# Patient Record
Sex: MALE | Race: OTHER | Employment: OTHER | URBAN - METROPOLITAN AREA
[De-identification: names, ages, dates, MRNs, and addresses within clinical notes are randomized per-mention and may not be internally consistent; named-entity substitution may affect disease eponyms.]

---

## 2017-01-04 ENCOUNTER — APPOINTMENT (OUTPATIENT)
Dept: CT IMAGING | Facility: HOSPITAL | Age: 77
DRG: 871 | End: 2017-01-04
Attending: INTERNAL MEDICINE
Payer: MEDICARE

## 2017-01-04 ENCOUNTER — HOSPITAL ENCOUNTER (INPATIENT)
Facility: HOSPITAL | Age: 77
LOS: 20 days | Discharge: HOME | DRG: 871 | End: 2017-01-24
Attending: EMERGENCY MEDICINE | Admitting: INTERNAL MEDICINE
Payer: MEDICARE

## 2017-01-04 ENCOUNTER — APPOINTMENT (OUTPATIENT)
Dept: GENERAL RADIOLOGY | Facility: HOSPITAL | Age: 77
DRG: 871 | End: 2017-01-04
Attending: EMERGENCY MEDICINE
Payer: MEDICARE

## 2017-01-04 ENCOUNTER — APPOINTMENT (OUTPATIENT)
Dept: CV DIAGNOSTICS | Facility: HOSPITAL | Age: 77
DRG: 871 | End: 2017-01-04
Attending: INTERNAL MEDICINE
Payer: MEDICARE

## 2017-01-04 DIAGNOSIS — J18.9 SEPSIS DUE TO PNEUMONIA (HCC): ICD-10-CM

## 2017-01-04 DIAGNOSIS — J18.9 COMMUNITY ACQUIRED PNEUMONIA: Primary | ICD-10-CM

## 2017-01-04 DIAGNOSIS — J44.1 COPD EXACERBATION (HCC): ICD-10-CM

## 2017-01-04 DIAGNOSIS — A41.9 SEPSIS DUE TO PNEUMONIA (HCC): ICD-10-CM

## 2017-01-04 PROBLEM — R79.89 AZOTEMIA: Status: ACTIVE | Noted: 2017-01-04

## 2017-01-04 PROBLEM — R73.9 HYPERGLYCEMIA: Status: ACTIVE | Noted: 2017-01-04

## 2017-01-04 PROBLEM — D64.9 ANEMIA: Status: ACTIVE | Noted: 2017-01-04

## 2017-01-04 LAB
ANION GAP SERPL CALC-SCNC: 12 MMOL/L (ref 0–18)
APTT PPP: 26.4 SECONDS (ref 23.2–35.3)
APTT PPP: 92 SECONDS (ref 23.2–35.3)
BACTERIA UR QL AUTO: NEGATIVE /HPF
BASOPHILS # BLD: 0 K/UL (ref 0–0.2)
BASOPHILS NFR BLD: 0 %
BILIRUB UR QL: NEGATIVE
BNP SERPL-MCNC: 196 PG/ML (ref 0–100)
BUN SERPL-MCNC: 35 MG/DL (ref 8–20)
BUN/CREAT SERPL: 35 (ref 10–20)
CALCIUM SERPL-MCNC: 9.1 MG/DL (ref 8.5–10.5)
CHLORIDE SERPL-SCNC: 99 MMOL/L (ref 95–110)
CHOLEST SERPL-MCNC: 138 MG/DL (ref 110–200)
CLARITY UR: CLEAR
CO2 SERPL-SCNC: 30 MMOL/L (ref 22–32)
COLOR UR: YELLOW
CREAT SERPL-MCNC: 1 MG/DL (ref 0.5–1.5)
EOSINOPHIL # BLD: 0 K/UL (ref 0–0.7)
EOSINOPHIL NFR BLD: 0 %
ERYTHROCYTE [DISTWIDTH] IN BLOOD BY AUTOMATED COUNT: 15.5 % (ref 11–15)
FLUAV + FLUBV RNA SPEC NAA+PROBE: NEGATIVE
GLUCOSE BLDC GLUCOMTR-MCNC: 125 MG/DL (ref 70–99)
GLUCOSE BLDC GLUCOMTR-MCNC: 174 MG/DL (ref 70–99)
GLUCOSE BLDC GLUCOMTR-MCNC: 232 MG/DL (ref 70–99)
GLUCOSE BLDC GLUCOMTR-MCNC: 323 MG/DL (ref 70–99)
GLUCOSE BLDC GLUCOMTR-MCNC: 366 MG/DL (ref 70–99)
GLUCOSE BLDC GLUCOMTR-MCNC: 374 MG/DL (ref 70–99)
GLUCOSE BLDC GLUCOMTR-MCNC: 431 MG/DL (ref 70–99)
GLUCOSE SERPL-MCNC: 256 MG/DL (ref 70–99)
HCT VFR BLD AUTO: 29.7 % (ref 41–52)
HDLC SERPL-MCNC: 45 MG/DL
HGB BLD-MCNC: 9.2 G/DL (ref 13.5–17.5)
HYALINE CASTS #/AREA URNS AUTO: 1 /LPF
INR BLD: 1 (ref 0.9–1.2)
KETONES UR-MCNC: NEGATIVE MG/DL
LACTATE SERPL-SCNC: 1.6 MMOL/L (ref 0.5–2.2)
LACTATE SERPL-SCNC: 1.9 MMOL/L (ref 0.5–2.2)
LACTATE SERPL-SCNC: 2.3 MMOL/L (ref 0.5–2.2)
LACTATE SERPL-SCNC: 2.3 MMOL/L (ref 0.5–2.2)
LACTATE SERPL-SCNC: 3.3 MMOL/L (ref 0.5–2.2)
LDLC SERPL CALC-MCNC: 55 MG/DL (ref 0–99)
LEUKOCYTE ESTERASE UR QL STRIP.AUTO: NEGATIVE
LYMPHOCYTES # BLD: 1.3 K/UL (ref 1–4)
LYMPHOCYTES NFR BLD: 8 %
MCH RBC QN AUTO: 26 PG (ref 27–32)
MCHC RBC AUTO-ENTMCNC: 31 G/DL (ref 32–37)
MCV RBC AUTO: 83.8 FL (ref 80–100)
MONOCYTES # BLD: 1.2 K/UL (ref 0–1)
MONOCYTES NFR BLD: 7 %
MRSA DNA SPEC QL NAA+PROBE: NEGATIVE
NEUTROPHILS # BLD AUTO: 13.4 K/UL (ref 1.8–7.7)
NEUTROPHILS NFR BLD: 85 %
NITRITE UR QL STRIP.AUTO: NEGATIVE
NONHDLC SERPL-MCNC: 93 MG/DL
OSMOLALITY UR CALC.SUM OF ELEC: 309 MOSM/KG (ref 275–295)
PH UR: 5 [PH] (ref 5–8)
PLATELET # BLD AUTO: 234 K/UL (ref 140–400)
PMV BLD AUTO: 10.4 FL (ref 7.4–10.3)
POTASSIUM SERPL-SCNC: 4 MMOL/L (ref 3.3–5.1)
PROCALCITONIN SERPL-MCNC: 0.3 NG/ML (ref ?–0.11)
PROT UR-MCNC: 100 MG/DL
PROTHROMBIN TIME: 12.6 SECONDS (ref 11.8–14.5)
RBC # BLD AUTO: 3.54 M/UL (ref 4.5–5.9)
RBC #/AREA URNS AUTO: 4 /HPF
SODIUM SERPL-SCNC: 141 MMOL/L (ref 136–144)
SP GR UR STRIP: 1.02 (ref 1–1.03)
TRIGL SERPL-MCNC: 191 MG/DL (ref 1–149)
TROPONIN I SERPL-MCNC: 0.05 NG/ML (ref ?–0.03)
TROPONIN I SERPL-MCNC: 0.05 NG/ML (ref ?–0.03)
UROBILINOGEN UR STRIP-ACNC: <2
VIT C UR-MCNC: NEGATIVE MG/DL
WBC # BLD AUTO: 15.9 K/UL (ref 4–11)
WBC #/AREA URNS AUTO: 1 /HPF

## 2017-01-04 PROCEDURE — 71010 XR CHEST AP PORTABLE  (CPT=71010): CPT

## 2017-01-04 PROCEDURE — 99223 1ST HOSP IP/OBS HIGH 75: CPT | Performed by: INTERNAL MEDICINE

## 2017-01-04 PROCEDURE — 93306 TTE W/DOPPLER COMPLETE: CPT

## 2017-01-04 PROCEDURE — 93306 TTE W/DOPPLER COMPLETE: CPT | Performed by: INTERNAL MEDICINE

## 2017-01-04 PROCEDURE — 71260 CT THORAX DX C+: CPT

## 2017-01-04 RX ORDER — ALBUTEROL SULFATE 2.5 MG/3ML
2.5 SOLUTION RESPIRATORY (INHALATION)
Status: DISPENSED | OUTPATIENT
Start: 2017-01-04 | End: 2017-01-07

## 2017-01-04 RX ORDER — ACETAMINOPHEN 500 MG
1000 TABLET ORAL ONCE
Status: COMPLETED | OUTPATIENT
Start: 2017-01-04 | End: 2017-01-04

## 2017-01-04 RX ORDER — IPRATROPIUM BROMIDE AND ALBUTEROL SULFATE 2.5; .5 MG/3ML; MG/3ML
3 SOLUTION RESPIRATORY (INHALATION) ONCE
Status: COMPLETED | OUTPATIENT
Start: 2017-01-04 | End: 2017-01-04

## 2017-01-04 RX ORDER — ALBUTEROL SULFATE 2.5 MG/3ML
2.5 SOLUTION RESPIRATORY (INHALATION)
Status: DISCONTINUED | OUTPATIENT
Start: 2017-01-04 | End: 2017-01-07

## 2017-01-04 RX ORDER — SODIUM CHLORIDE 0.9 % (FLUSH) 0.9 %
SYRINGE (ML) INJECTION
Status: DISPENSED
Start: 2017-01-04 | End: 2017-01-05

## 2017-01-04 RX ORDER — ATORVASTATIN CALCIUM 40 MG/1
80 TABLET, FILM COATED ORAL NIGHTLY
Status: DISCONTINUED | OUTPATIENT
Start: 2017-01-04 | End: 2017-01-24

## 2017-01-04 RX ORDER — METHYLPREDNISOLONE SODIUM SUCCINATE 125 MG/2ML
125 INJECTION, POWDER, LYOPHILIZED, FOR SOLUTION INTRAMUSCULAR; INTRAVENOUS ONCE
Status: COMPLETED | OUTPATIENT
Start: 2017-01-04 | End: 2017-01-04

## 2017-01-04 RX ORDER — ALBUTEROL SULFATE 90 UG/1
2 AEROSOL, METERED RESPIRATORY (INHALATION) EVERY 4 HOURS PRN
Status: DISCONTINUED | OUTPATIENT
Start: 2017-01-04 | End: 2017-01-24

## 2017-01-04 RX ORDER — LISINOPRIL 5 MG/1
5 TABLET ORAL DAILY
Status: DISCONTINUED | OUTPATIENT
Start: 2017-01-04 | End: 2017-01-17

## 2017-01-04 RX ORDER — LEVOTHYROXINE SODIUM 0.05 MG/1
50 TABLET ORAL
Status: DISCONTINUED | OUTPATIENT
Start: 2017-01-04 | End: 2017-01-24

## 2017-01-04 RX ORDER — HEPARIN SODIUM AND DEXTROSE 10000; 5 [USP'U]/100ML; G/100ML
INJECTION INTRAVENOUS CONTINUOUS
Status: DISCONTINUED | OUTPATIENT
Start: 2017-01-04 | End: 2017-01-05

## 2017-01-04 RX ORDER — DILTIAZEM HYDROCHLORIDE 240 MG/1
240 CAPSULE, EXTENDED RELEASE ORAL DAILY
Status: ON HOLD | COMMUNITY
End: 2018-01-01

## 2017-01-04 RX ORDER — METHYLPREDNISOLONE SODIUM SUCCINATE 40 MG/ML
40 INJECTION, POWDER, LYOPHILIZED, FOR SOLUTION INTRAMUSCULAR; INTRAVENOUS EVERY 12 HOURS
Status: DISCONTINUED | OUTPATIENT
Start: 2017-01-04 | End: 2017-01-04

## 2017-01-04 RX ORDER — METHYLPREDNISOLONE SODIUM SUCCINATE 40 MG/ML
40 INJECTION, POWDER, LYOPHILIZED, FOR SOLUTION INTRAMUSCULAR; INTRAVENOUS EVERY 12 HOURS
Status: DISCONTINUED | OUTPATIENT
Start: 2017-01-04 | End: 2017-01-06

## 2017-01-04 RX ORDER — DILTIAZEM HYDROCHLORIDE 240 MG/1
240 CAPSULE, COATED, EXTENDED RELEASE ORAL DAILY
Status: DISCONTINUED | OUTPATIENT
Start: 2017-01-04 | End: 2017-01-24

## 2017-01-04 RX ORDER — HEPARIN SODIUM AND DEXTROSE 10000; 5 [USP'U]/100ML; G/100ML
18 INJECTION INTRAVENOUS ONCE
Status: COMPLETED | OUTPATIENT
Start: 2017-01-04 | End: 2017-01-04

## 2017-01-04 RX ORDER — HEPARIN SODIUM 1000 [USP'U]/ML
80 INJECTION, SOLUTION INTRAVENOUS; SUBCUTANEOUS ONCE
Status: COMPLETED | OUTPATIENT
Start: 2017-01-04 | End: 2017-01-04

## 2017-01-04 RX ORDER — MORPHINE SULFATE 2 MG/ML
2 INJECTION, SOLUTION INTRAMUSCULAR; INTRAVENOUS ONCE
Status: COMPLETED | OUTPATIENT
Start: 2017-01-04 | End: 2017-01-04

## 2017-01-04 NOTE — ED NOTES
Pt has diminished lung sounds to the upper lobes bilateral, very minimal air flow. Pt 86% on 5 LPM via nasal cannula. Placed pt on NRB at this time at 15 lpm, oxygen increased to 93%.

## 2017-01-04 NOTE — ED INITIAL ASSESSMENT (HPI)
Pt BIBA for SOB at home. Pt uses home oxygen 3 lpm, increased to 5 lpm by EMS when found to be 86%, given one treatment albuterol increased to 92%. Pt recently admitted here for respiratory diagnosis on 12/27, was discharged on [address]

## 2017-01-04 NOTE — ED NOTES
Patient to ED via EMS for c/o sob- per EMS patient found at 86% RA- oxygen applied and medicated with albuterol neb tx- with O2 sats up to 93% pta. Patient appears to be labored, unable to form full sentences.   Lung sounds absent in bilateral upper lobes

## 2017-01-04 NOTE — CONSULTS
Pulmonary/Critical Care Consult Note    HPI:   Tuan Sharma is a 68year old male with Patient presents with:  Dyspnea HECTOR SOB (respiratory)    Arelis Contreras MD    Pt is a 67 yo with h/o HL, CAD, DM, HTN, and COPD who presented with  Severe mouth daily. Disp:  Rfl:    MetFORMIN HCl (GLUCOPHAGE) 500 MG Oral Tab 500 mg 2 (two) times daily with meals.    Disp:  Rfl:    Levothyroxine Sodium (SYNTHROID) 50 MCG Oral Tab Take 50 mcg by mouth before breakfast.   Disp:  Rfl: 1   Atorvastatin Calcium (L (Last 24 hours) at 01/04/17 0909  Last data filed at 01/04/17 2323   Gross per 24 hour   Intake   2000 ml   Output    150 ml   Net   1850 ml     NAD  A&Ox3  Head AT/NC  Anicteric  Lung very quiet  CV reg  Abd soft NT/ND  Ext trace edema  Skin No rash  Psyc

## 2017-01-04 NOTE — ED PROVIDER NOTES
Patient Seen in: [facility] AND [facility] Emergency Department    History   Patient presents with:  Dyspnea HECTOR SOB (respiratory)    Stated Complaint: SOB    HPI    67 yo male with h/o COPD was hospitalized recently for COPD and discharged on 12/31.  He is on 3l Tab,  Take 50 mcg by mouth before breakfast.     Atorvastatin Calcium (LIPITOR) 80 MG Oral Tab,  80 mg nightly. Roflumilast 500 MCG Oral Tab,  Take 500 mcg by mouth daily. [address] [place],         History reviewed.  No pertinent f alert and oriented to person, place, and time. No cranial nerve deficit. Skin: Skin is warm and dry. Psychiatric: He has a normal mood and affect. His behavior is normal.   Nursing note and vitals reviewed.             ED Course     Labs Reviewed   TIERRA other components within normal limits   POCT GLUCOSE - Abnormal; Notable for the following:     POC Glucose  374 (*)     All other components within normal limits   POCT GLUCOSE - Abnormal; Notable for the following:     POC Glucose  323 (*)     All other concerning for pneumonia. Appearance similar to mildly increased as compared to prior study dated 12/27/2016. Additional small bilateral pleural effusions are noted. Diffuse interstitial thickening is noted in both lungs.  Can be seen with atypical infe

## 2017-01-05 LAB
ANION GAP SERPL CALC-SCNC: 7 MMOL/L (ref 0–18)
APTT PPP: 86.6 SECONDS (ref 23.2–35.3)
BUN SERPL-MCNC: 27 MG/DL (ref 8–20)
BUN/CREAT SERPL: 24.1 (ref 10–20)
CALCIUM SERPL-MCNC: 8.3 MG/DL (ref 8.5–10.5)
CHLORIDE SERPL-SCNC: 103 MMOL/L (ref 95–110)
CO2 SERPL-SCNC: 32 MMOL/L (ref 22–32)
CREAT SERPL-MCNC: 1.12 MG/DL (ref 0.5–1.5)
ERYTHROCYTE [DISTWIDTH] IN BLOOD BY AUTOMATED COUNT: 15.9 % (ref 11–15)
GLUCOSE BLDC GLUCOMTR-MCNC: 133 MG/DL (ref 70–99)
GLUCOSE BLDC GLUCOMTR-MCNC: 133 MG/DL (ref 70–99)
GLUCOSE BLDC GLUCOMTR-MCNC: 138 MG/DL (ref 70–99)
GLUCOSE BLDC GLUCOMTR-MCNC: 141 MG/DL (ref 70–99)
GLUCOSE BLDC GLUCOMTR-MCNC: 143 MG/DL (ref 70–99)
GLUCOSE BLDC GLUCOMTR-MCNC: 166 MG/DL (ref 70–99)
GLUCOSE BLDC GLUCOMTR-MCNC: 174 MG/DL (ref 70–99)
GLUCOSE BLDC GLUCOMTR-MCNC: 177 MG/DL (ref 70–99)
GLUCOSE BLDC GLUCOMTR-MCNC: 178 MG/DL (ref 70–99)
GLUCOSE BLDC GLUCOMTR-MCNC: 186 MG/DL (ref 70–99)
GLUCOSE BLDC GLUCOMTR-MCNC: 189 MG/DL (ref 70–99)
GLUCOSE BLDC GLUCOMTR-MCNC: 194 MG/DL (ref 70–99)
GLUCOSE BLDC GLUCOMTR-MCNC: 202 MG/DL (ref 70–99)
GLUCOSE BLDC GLUCOMTR-MCNC: 215 MG/DL (ref 70–99)
GLUCOSE BLDC GLUCOMTR-MCNC: 219 MG/DL (ref 70–99)
GLUCOSE BLDC GLUCOMTR-MCNC: 241 MG/DL (ref 70–99)
GLUCOSE BLDC GLUCOMTR-MCNC: 245 MG/DL (ref 70–99)
GLUCOSE BLDC GLUCOMTR-MCNC: 295 MG/DL (ref 70–99)
GLUCOSE BLDC GLUCOMTR-MCNC: 310 MG/DL (ref 70–99)
GLUCOSE SERPL-MCNC: 132 MG/DL (ref 70–99)
HCT VFR BLD AUTO: 25.9 % (ref 41–52)
HGB BLD-MCNC: 8 G/DL (ref 13.5–17.5)
MCH RBC QN AUTO: 26.1 PG (ref 27–32)
MCHC RBC AUTO-ENTMCNC: 31.1 G/DL (ref 32–37)
MCV RBC AUTO: 84 FL (ref 80–100)
OSMOLALITY UR CALC.SUM OF ELEC: 301 MOSM/KG (ref 275–295)
PLATELET # BLD AUTO: 202 K/UL (ref 140–400)
PMV BLD AUTO: 11 FL (ref 7.4–10.3)
POTASSIUM SERPL-SCNC: 4 MMOL/L (ref 3.3–5.1)
RBC # BLD AUTO: 3.08 M/UL (ref 4.5–5.9)
SODIUM SERPL-SCNC: 142 MMOL/L (ref 136–144)
WBC # BLD AUTO: 18.9 K/UL (ref 4–11)

## 2017-01-05 PROCEDURE — 99233 SBSQ HOSP IP/OBS HIGH 50: CPT | Performed by: INTERNAL MEDICINE

## 2017-01-05 RX ORDER — DEXTROSE MONOHYDRATE 25 G/50ML
50 INJECTION, SOLUTION INTRAVENOUS AS NEEDED
Status: DISCONTINUED | OUTPATIENT
Start: 2017-01-05 | End: 2017-01-24

## 2017-01-05 NOTE — HISTORICAL OFFICE NOTE
Nicanor Quintana  [phone]  : 1940  ACCOUNT:  [id number]  Hospital: [facility]    DATE:  2015    DISCHARGE SUMMARY    HOSPITAL COURSE: A 66-year-old patient who had been in [facility] on 02/16/15 with a recurren

## 2017-01-05 NOTE — H&P
[facility]    History and Physical    Dion Artis Patient Status:  Emergency    1940 MRN [id number]   Location [address] Attending Brenda Her MD   [address] Day # 0 PCP Arelis Contreras, resp. rate 21, height 5' 5\" (1.651 m), weight 147 lb 12.8 oz (67.042 kg), SpO2 95 %. Constitutional: He appears well-developed. HENT:   Head: Normocephalic. Eyes: Pupils are equal, round, and reactive to light. Cardiovascular: Normal rate.     Pu Coronary atherosclerosis post coronary artery bypass. 9. Mild aortic valve calcification.          Ekg 12-lead    1/4/2017  ECG Report  Interpretation  -------------------------- Sinus Tachycardia -Right bundle branch block with left axis -bifascicular bloc

## 2017-01-05 NOTE — PAYOR COMM NOTE
Attending Physician: Gayla Larson MD    Review Type: CONTINUED STAY  Reviewer: Ochoa Maier     Date: January 5, 2017 - 11:54 AM  Payor: [employer] Lina[employer] MEDICARE ADV HMO  Authorization Number: N/A  Admit date: 1/4/2017  2:59 AM        REVIEWER COMMENTS  V Abilio Lindo RN      Insulin Regular Human (NOVOLIN R) 100 Units in sodium chloride 0.9 % 100 mL infusion     Date Action Dose Route User    1/5/2017 1000 Rate/Dose Change 10 Units/hr Intravenous Caleb Jennings RN    1/5/2017 0800 Rate/Dose Shari Edwards RN    1/4/2017 1739 New Bag 3.375 g Intravenous Lety Giang RN      Roflumilast TABS 500 mcg     Date Action Dose Route User    1/5/2017 1001 Given 500 mcg Oral Vinita Daugherty RN            Procedures:      Plan:  Attending Physician: Margie Pretty MG Oral Tab,  Take 10 mg by mouth daily. albuterol sulfate (2.5 MG/3ML) 0.083% Inhalation Nebu Soln,  Take 2.5 mg by nebulization 4 (four) times daily as needed for Wheezing.    Albuterol Sulfate HFA (PROAIR HFA) 108 (90 BASE) MCG/ACT Inhalation Aero Soln person, place, and time. He appears well-developed and well-nourished. No distress. HENT:   Head: Normocephalic and atraumatic.    Mouth/Throat: Oropharynx is clear and moist.   Eyes: Conjunctivae and EOM are normal. Pupils are equal, round, and reactive CULTURE REFLEX - Abnormal; Notable for the following:     Protein Urine 100  (*)     Glucose Urine Trace (*)     Blood Urine Trace (*)     RBC URINE 4 (*)     All other components within normal limits   PROCALCITONIN - Abnormal; Notable for the following: result                 Please view results for these tests on the individual orders.    PTT, ACTIVATED   RAINBOW DRAW BLUE   RAINBOW DRAW GOLD   RAINBOW DRAW LAVENDER   RAINBOW DRAW LIGHT GREEN   RAINBOW DRAW DARK GREEN   RAINBOW DRAW LAVENDER TALL (BNP) Disposition and Plan     Clinical Impression:  Community acquired pneumonia  (primary encounter diagnosis)  COPD exacerbation ([address]  Sepsis due to pneumonia [facility])    Disposition:  Admit    Follow-up:  No follow-up provider specified.     Medications Pre CAD (coronary artery disease)      s/p CABG   • COPD (chronic obstructive pulmonary disease) (HCC)    • Coronary atherosclerosis    • Diabetes (HCC)    • High cholesterol    • High blood pressure    • Disorder of thyroid    • Cataract          Past Surgica 12/28/2016   TROP 0.05* 01/04/2017     Xr Chest Ap Portable  (cpt=71010)    1/4/2017  CONCLUSION:  1. Perhaps slight worsening of left basilar pneumonia from December 29, 2016. 2. The rest of the findings are relatively stable. 3. Cardiomegaly.  4. Postop c COPD on nebs    HTN BP satble    HLD    CAD on meds              Lisandra Patel MD, Lenny Livingston MD  1/4/2017       Electronically signed by Sana Penaloza MD on 1/4/2017  6:16 PM        REVIEWER COMMENTS:     OTHER:

## 2017-01-05 NOTE — PROGRESS NOTES
[facility] - [facility]      Sepsis Reassessment Note    /77 mmHg  Pulse 113  Temp(Src) 99.3 °F (37.4 °C) (Temporal)  Resp 24  Ht 5' 5\" (1.651 m)  Wt 147 lb 12.8 oz (67.042 kg)  BMI 24.60 kg/m2  SpO2 95%     8:43 PM    Cardiac:  Regularity: Reg

## 2017-01-05 NOTE — CM/SW NOTE
CTL/Rounds: Spoke with patient and wife at bedside regarding discharge needs. Patient is independent with ADLs and driving. He lives with his wife and she is in good health. Patient has home 02 and a neb machine at home.  Patient wishes to receive handouts

## 2017-01-05 NOTE — PLAN OF CARE
Problem: Patient Centered Care  Goal: Patient preferences are identified and integrated in the patient’s plan of care  Interventions:  - What would you like us to know as we care for you? No requests at this time.   - Provide timely, complete, and accurate Monitor labs and rhythm and assess patient for signs and symptoms of electrolyte imbalances  - Administer electrolyte replacement as ordered  - Monitor response to electrolyte replacements, including rhythm and repeat lab results as appropriate  - Instruct

## 2017-01-05 NOTE — PROGRESS NOTES
[facility]     Progress Note        Graciela Keane Patient Status:  Inpatient    1940 MRN [id number]   Location [facility] 1SW Attending Jc Keyes MD   [facility] Day # 1 PCP Arelis Contreras MD       Subjective Levothyroxine Sodium (SYNTHROID) tab 50 mcg 50 mcg Oral Before breakfast   lisinopril (PRINIVIL,ZESTRIL) tab 5 mg 5 mg Oral Daily   Roflumilast TABS 500 mcg 500 mcg Oral Daily       Continuous Infusions:   • Continuous dose Heparin infusion 1,200 Units/h left basilar pneumonia from December 29, 2016. 2. The rest of the findings are relatively stable. 3. Cardiomegaly. 4. Postop changes. 5. Atherosclerosis. 6. A preliminary report was submitted and there are no major discrepancies.          Xr Chest Ap Portab mechanical pleurodesis on 02/18/2015. History of CAD post CABG in 2009. TECHNIQUE:   Portable AP view. FINDINGS:  POSITION: Upright CARDIAC: Normal size. Surgical clips. MEDIASTINUM/ASHER:   Slight aortic elongation with calcification in the knob.  No vis PLEURA: Blunting of the costophrenic angle bilaterally suggest small bilateral pleural effusions. Dictated by (CST): Mamta Burton MD on 12/27/2016 at 19:47     Approved by (CST): Mamta Burton MD on 12/27/2016 at 19:48          12/27/2016  CONCLUSION:  1. emphysema. Thickening of lower lobe bronchi and bronchioles. Trachea and mainstem bronchi normal. CHEST WALL: Normal.  No mass or axillary adenopathy. LIMITED ABDOMEN: Normal adrenal glands. No suspect abnormality.  BONES: Moderate chronic T5 vertebral bod Pulmonary hypertension  5.  COPD         Plan   -Patient with evidence of dyspnea not resolved after recent hospitalization  -Dyspnea likely multifactorial in nature with basilar pneumonia, new diagnosis of left sided pulmonary embolism(although not signifi

## 2017-01-05 NOTE — CONSULTS
[facility]    Cardiology Consultation    Buzz Hill Patient Status:  Inpatient    1940 MRN [id number]   Location [facility] 1SW Attending Laisha Stanley MD   [facility] Day # 1 PCP Arelis Contreras MD      Nebulization, Q4H WA  •  Piperacillin Sod-Tazobactam So (ZOSYN) 3.375 g in dextrose 5 % 100 mL IVPB, 3.375 g, Intravenous, Q8H  •  MethylPREDNISolone Sodium Succ (Solu-MEDROL) injection 40 mg, 40 mg, Intravenous, Q12H  •  heparin (PORCINE) drip 92522gtxyt/ kg)      Physical Exam:   General: Alert and oriented x 3. Tachypneic, unable to speak in full sentences. HEENT: Normocephalic, anicteric sclera, neck supple. Neck: No JVD, carotids 2+, no bruits. Cardiac: Regular rate and rhythm.  S1, S2 normal. Soft II by Izabel Toribio 12-lead    1/4/2017  ECG Report  Interpretation  -------------------------- atrial  Tachycardia; ? flutter -Incomplete right bundle branch block and left axis -anterior fascicular block.  -Old inferior infarct.   -ST depression -No

## 2017-01-06 LAB
APTT PPP: 30.3 SECONDS (ref 23.2–35.3)
BNP SERPL-MCNC: 404 PG/ML (ref 0–100)
GLUCOSE BLDC GLUCOMTR-MCNC: 110 MG/DL (ref 70–99)
GLUCOSE BLDC GLUCOMTR-MCNC: 114 MG/DL (ref 70–99)
GLUCOSE BLDC GLUCOMTR-MCNC: 121 MG/DL (ref 70–99)
GLUCOSE BLDC GLUCOMTR-MCNC: 121 MG/DL (ref 70–99)
GLUCOSE BLDC GLUCOMTR-MCNC: 128 MG/DL (ref 70–99)
GLUCOSE BLDC GLUCOMTR-MCNC: 145 MG/DL (ref 70–99)
GLUCOSE BLDC GLUCOMTR-MCNC: 147 MG/DL (ref 70–99)
GLUCOSE BLDC GLUCOMTR-MCNC: 157 MG/DL (ref 70–99)
GLUCOSE BLDC GLUCOMTR-MCNC: 173 MG/DL (ref 70–99)
GLUCOSE BLDC GLUCOMTR-MCNC: 177 MG/DL (ref 70–99)
GLUCOSE BLDC GLUCOMTR-MCNC: 188 MG/DL (ref 70–99)
GLUCOSE BLDC GLUCOMTR-MCNC: 197 MG/DL (ref 70–99)
GLUCOSE BLDC GLUCOMTR-MCNC: 202 MG/DL (ref 70–99)
GLUCOSE BLDC GLUCOMTR-MCNC: 238 MG/DL (ref 70–99)
GLUCOSE BLDC GLUCOMTR-MCNC: 240 MG/DL (ref 70–99)
GLUCOSE BLDC GLUCOMTR-MCNC: 241 MG/DL (ref 70–99)

## 2017-01-06 PROCEDURE — 99232 SBSQ HOSP IP/OBS MODERATE 35: CPT | Performed by: INTERNAL MEDICINE

## 2017-01-06 RX ORDER — LISINOPRIL 10 MG/1
TABLET ORAL
Status: COMPLETED
Start: 2017-01-06 | End: 2017-01-06

## 2017-01-06 RX ORDER — FUROSEMIDE 10 MG/ML
INJECTION INTRAMUSCULAR; INTRAVENOUS
Status: COMPLETED
Start: 2017-01-06 | End: 2017-01-06

## 2017-01-06 RX ORDER — PREDNISONE 20 MG/1
40 TABLET ORAL
Status: DISCONTINUED | OUTPATIENT
Start: 2017-01-07 | End: 2017-01-11

## 2017-01-06 RX ORDER — ALBUTEROL SULFATE 2.5 MG/3ML
SOLUTION RESPIRATORY (INHALATION)
Status: COMPLETED
Start: 2017-01-06 | End: 2017-01-06

## 2017-01-06 RX ORDER — SODIUM CHLORIDE 0.9 % (FLUSH) 0.9 %
SYRINGE (ML) INJECTION
Status: COMPLETED
Start: 2017-01-06 | End: 2017-01-06

## 2017-01-06 RX ORDER — GUAIFENESIN 100 MG/5ML
SOLUTION ORAL EVERY 6 HOURS PRN
Status: DISCONTINUED | OUTPATIENT
Start: 2017-01-06 | End: 2017-01-24

## 2017-01-06 RX ORDER — FUROSEMIDE 10 MG/ML
40 INJECTION INTRAMUSCULAR; INTRAVENOUS ONCE
Status: COMPLETED | OUTPATIENT
Start: 2017-01-06 | End: 2017-01-06

## 2017-01-06 NOTE — PROGRESS NOTES
[facility]  Progress Note    Moiz Gravely Patient Status:  Inpatient    1940 MRN [id number]   Location [facility] 3W/SW Attending Amando Chu MD   [facility] Day # 2 PCP Arelis Contreras MD     Assessment:    1.  Pneumoni Intravenous Q8H   • MethylPREDNISolone Sodium Succ  40 mg Intravenous Q12H   • insulin (NOVOLIN R) bolus from bag  0.1 Units/kg Intravenous Once   • albuterol sulfate  2.5 mg Nebulization Q6H WA   • Atorvastatin Calcium  80 mg Oral Nightly   • DiltiaZEM HC

## 2017-01-06 NOTE — PLAN OF CARE
Glucose maintained within prescribed range Progressing    Pt has been on insulin drip, transitioning to subcut insulin with levemir and sliding scale plus mealtime insulin. Solumedrol has been discontinued, will start oral prednisone tomorrow.  accuchecks a

## 2017-01-06 NOTE — PROGRESS NOTES
[facility]     Progress Note        Charlotte Mckeon Patient Status:  Inpatient    1940 MRN [id number]   Location [facility] 1SW Attending Nichole Alfaro MD   [facility] Day # 2 PCP Arelis Contreras MD       Subjective Atorvastatin Calcium (LIPITOR) tab 80 mg 80 mg Oral Nightly   DiltiaZEM HCl ER Coated Beads (CARDIZEM CD) 24 hr cap 240 mg 240 mg Oral Daily   Levothyroxine Sodium (SYNTHROID) tab 50 mcg 50 mcg Oral Before breakfast   lisinopril (PRINIVIL,ZESTRIL) tab 5 was submitted and there are no major discrepancies. Xr Chest Ap Portable  (cpt=71010)    12/29/2016  PROCEDURE: XR CHEST AP PORTABLE (CPT=71010) TIME: 0608 hrs. .   COMPARISON: [facility], XR CHEST AP PORTABLE (CPT=71010), 12/28/2016 MEDIASTINUM/ASHER:   Slight aortic elongation with calcification in the knob. No visible mass or adenopathy. VASCULARITY: Normal LUNGS/PLEURA: The lungs are hyperinflated.  Surgical clips project over the right lower chest including one just lateral to the r Bobby Rdz MD on 12/27/2016 at 19:48          12/27/2016  CONCLUSION:  1. Patchy opacities in the bilateral lung bases there is atelectasis or consolidation 2. Small bilateral pleural effusions. 3. Changes of cardiothoracic surgery are again seen. glands. No suspect abnormality. BONES: Moderate chronic T5 vertebral body compression fracture. OTHER: Negative.    Dictated by (CST): Andrew Fernandez MD on 1/04/2017 at 10:51     Approved by (CST): Andrew Fernandez MD on 1/04/2017 at 11:14          1/4/2017 pneumonia, new diagnosis of left sided pulmonary embolism(although not significant in size), pulmonary hypertension, COPD  -Eliquis for anticoagulation  -IV steroids will transition to Prednisone  -ICS/LABA  -Daliresp  -LAMA  -Continue Zosyn.  No significan

## 2017-01-06 NOTE — PLAN OF CARE
Problem: RESPIRATORY - ADULT  Goal: Achieves optimal ventilation and oxygenation  INTERVENTIONS:  - Assess for changes in respiratory status  - Assess for changes in mentation and behavior  - Position to facilitate oxygenation and minimize respiratory effo of volume excess or deficit  - Monitor intake, output and patient weight  - Monitor urine specific gravity, serum osmolarity and serum sodium as indicated or ordered  - Monitor response to interventions for patient’s volume status, including labs, urine ou

## 2017-01-07 ENCOUNTER — APPOINTMENT (OUTPATIENT)
Dept: GENERAL RADIOLOGY | Facility: HOSPITAL | Age: 77
DRG: 871 | End: 2017-01-07
Attending: INTERNAL MEDICINE
Payer: MEDICARE

## 2017-01-07 LAB
ANION GAP SERPL CALC-SCNC: 7 MMOL/L (ref 0–18)
BUN SERPL-MCNC: 30 MG/DL (ref 8–20)
BUN/CREAT SERPL: 24.2 (ref 10–20)
CALCIUM SERPL-MCNC: 8.6 MG/DL (ref 8.5–10.5)
CHLORIDE SERPL-SCNC: 101 MMOL/L (ref 95–110)
CO2 SERPL-SCNC: 35 MMOL/L (ref 22–32)
CREAT SERPL-MCNC: 1.24 MG/DL (ref 0.5–1.5)
GLUCOSE BLDC GLUCOMTR-MCNC: 226 MG/DL (ref 70–99)
GLUCOSE BLDC GLUCOMTR-MCNC: 234 MG/DL (ref 70–99)
GLUCOSE BLDC GLUCOMTR-MCNC: 301 MG/DL (ref 70–99)
GLUCOSE BLDC GLUCOMTR-MCNC: 305 MG/DL (ref 70–99)
GLUCOSE SERPL-MCNC: 229 MG/DL (ref 70–99)
MAGNESIUM SERPL-MCNC: 1.9 MG/DL (ref 1.8–2.5)
OSMOLALITY UR CALC.SUM OF ELEC: 309 MOSM/KG (ref 275–295)
POTASSIUM SERPL-SCNC: 3.8 MMOL/L (ref 3.3–5.1)
SODIUM SERPL-SCNC: 143 MMOL/L (ref 136–144)

## 2017-01-07 PROCEDURE — 71010 XR CHEST AP PORTABLE  (CPT=71010): CPT

## 2017-01-07 PROCEDURE — 99232 SBSQ HOSP IP/OBS MODERATE 35: CPT | Performed by: INTERNAL MEDICINE

## 2017-01-07 RX ORDER — FUROSEMIDE 10 MG/ML
20 INJECTION INTRAMUSCULAR; INTRAVENOUS ONCE
Status: COMPLETED | OUTPATIENT
Start: 2017-01-08 | End: 2017-01-08

## 2017-01-07 RX ORDER — IPRATROPIUM BROMIDE AND ALBUTEROL SULFATE 2.5; .5 MG/3ML; MG/3ML
3 SOLUTION RESPIRATORY (INHALATION) EVERY 6 HOURS
Status: DISCONTINUED | OUTPATIENT
Start: 2017-01-07 | End: 2017-01-09

## 2017-01-07 RX ORDER — POTASSIUM CHLORIDE 20 MEQ/1
40 TABLET, EXTENDED RELEASE ORAL ONCE
Status: COMPLETED | OUTPATIENT
Start: 2017-01-07 | End: 2017-01-07

## 2017-01-07 RX ORDER — METHYLPREDNISOLONE SODIUM SUCCINATE 40 MG/ML
40 INJECTION, POWDER, LYOPHILIZED, FOR SOLUTION INTRAMUSCULAR; INTRAVENOUS EVERY 12 HOURS
Status: DISCONTINUED | OUTPATIENT
Start: 2017-01-07 | End: 2017-01-08

## 2017-01-07 RX ORDER — SODIUM CHLORIDE 9 MG/ML
INJECTION, SOLUTION INTRAVENOUS
Status: COMPLETED
Start: 2017-01-07 | End: 2017-01-07

## 2017-01-07 NOTE — PROGRESS NOTES
[facility]     Progress Note        Elder Alexander Patient Status:  Inpatient    1940 MRN [id number]   Location [facility] 1SW Attending Celeste Buckner MD   [address] Day # 3 PCP Arelis Contreras MD       Subjective Atorvastatin Calcium (LIPITOR) tab 80 mg 80 mg Oral Nightly   DiltiaZEM HCl ER Coated Beads (CARDIZEM CD) 24 hr cap 240 mg 240 mg Oral Daily   Levothyroxine Sodium (SYNTHROID) tab 50 mcg 50 mcg Oral Before breakfast   lisinopril (PRINIVIL,ZESTRIL) tab 5 basilar pneumonia from December 29, 2016. 2. The rest of the findings are relatively stable. 3. Cardiomegaly. 4. Postop changes. 5. Atherosclerosis. 6. A preliminary report was submitted and there are no major discrepancies.          Xr Chest Ap Portable  ( pleurodesis on 02/18/2015. History of CAD post CABG in 2009. TECHNIQUE:   Portable AP view. FINDINGS:  POSITION: Upright CARDIAC: Normal size. Surgical clips. MEDIASTINUM/ASHER:   Slight aortic elongation with calcification in the knob.  No visible mass o of the costophrenic angle bilaterally suggest small bilateral pleural effusions. Dictated by (CST): Giovani Moss MD on 12/27/2016 at 19:47     Approved by (CST): Giovani Moss MD on 12/27/2016 at 19:48          12/27/2016  CONCLUSION:  1.  Patchy opacities lower lobe bronchi and bronchioles. Trachea and mainstem bronchi normal. CHEST WALL: Normal.  No mass or axillary adenopathy. LIMITED ABDOMEN: Normal adrenal glands. No suspect abnormality. BONES: Moderate chronic T5 vertebral body compression fracture. hypertension  5.  COPD with acute exacerbation         Plan   -Patient with evidence of dyspnea not resolved after recent hospitalization  -Dyspnea likely multifactorial in nature with basilar pneumonia, new diagnosis of left sided pulmonary embolism(althou

## 2017-01-07 NOTE — PROGRESS NOTES
[place] [facility] - [facility]    Progress Note    Luz Whatley Patient Status:  Inpatient    1940 MRN [id number]   Location [facility] 5SW/SE Attending Spenser Soliz MD   [facility] Day # 3 PCP Arelis Contreras MD     Subjective: alveolar and interstitial infiltrates with minimal bibasilar pleural reaction.                 Octavia Loza MD, Nancy Shea MD  1/7/2017

## 2017-01-07 NOTE — PLAN OF CARE
METABOLIC/FLUID AND ELECTROLYTES - ADULT    • Glucose maintained within prescribed range Not Progressing          METABOLIC/FLUID AND ELECTROLYTES - ADULT    • Electrolytes maintained within normal limits Progressing    • Hemodynamic stability and optimal

## 2017-01-07 NOTE — PROGRESS NOTES
[facility] Cardiology  Progress Note    [facility] Patient Status:  Inpatient    1940 MRN [id number]   Location [facility] 5SW/SE Attending Belkis Tsang MD   [facility] Day # 3 PCP Arelis Mcdaniel AST 22 12/27/2016   ALT 16* 12/27/2016   PTT 30.3 01/06/2017   INR 1.0 01/04/2017   MG 1.9 01/07/2017   TROP 0.05* 01/04/2017       Xr Chest Ap Portable  (cpt=71010)    1/7/2017  CONCLUSION:  1. Borderline cardiomegaly. Sternotomy changes. CABG.  2. Pulmo

## 2017-01-07 NOTE — PROGRESS NOTES
[facility]    Progress Note    Lina Ferguson Patient Status:  Inpatient    1940 MRN [id number]   Location [facility] 3W/SW Attending Oscar Schumacher MD   [facility] Day # 2 PCP Arelis Contreras MD     Subjective:

## 2017-01-08 LAB
ANION GAP SERPL CALC-SCNC: 10 MMOL/L (ref 0–18)
BUN SERPL-MCNC: 30 MG/DL (ref 8–20)
BUN/CREAT SERPL: 28 (ref 10–20)
CALCIUM SERPL-MCNC: 8.4 MG/DL (ref 8.5–10.5)
CHLORIDE SERPL-SCNC: 98 MMOL/L (ref 95–110)
CO2 SERPL-SCNC: 31 MMOL/L (ref 22–32)
CREAT SERPL-MCNC: 1.07 MG/DL (ref 0.5–1.5)
GLUCOSE BLDC GLUCOMTR-MCNC: 298 MG/DL (ref 70–99)
GLUCOSE BLDC GLUCOMTR-MCNC: 332 MG/DL (ref 70–99)
GLUCOSE BLDC GLUCOMTR-MCNC: 389 MG/DL (ref 70–99)
GLUCOSE BLDC GLUCOMTR-MCNC: >500 MG/DL (ref 70–99)
GLUCOSE SERPL-MCNC: 299 MG/DL (ref 70–99)
MAGNESIUM SERPL-MCNC: 2.1 MG/DL (ref 1.8–2.5)
OSMOLALITY UR CALC.SUM OF ELEC: 305 MOSM/KG (ref 275–295)
POTASSIUM SERPL-SCNC: 4.8 MMOL/L (ref 3.3–5.1)
POTASSIUM SERPL-SCNC: 4.9 MMOL/L (ref 3.3–5.1)
SODIUM SERPL-SCNC: 139 MMOL/L (ref 136–144)

## 2017-01-08 PROCEDURE — 99232 SBSQ HOSP IP/OBS MODERATE 35: CPT | Performed by: INTERNAL MEDICINE

## 2017-01-08 RX ORDER — FUROSEMIDE 10 MG/ML
20 INJECTION INTRAMUSCULAR; INTRAVENOUS ONCE
Status: COMPLETED | OUTPATIENT
Start: 2017-01-09 | End: 2017-01-09

## 2017-01-08 NOTE — PROGRESS NOTES
[place] [facility] - [facility]    Progress Note    Franchesca Ped Patient Status:  Inpatient    1940 MRN [id number]   Location [facility] 5SW/SE Attending Justin Aguilar MD   [facility] Day # 4 PCP Arelis Contreras MD     Subjective: pleural reaction.                 Bessie Yousif MD, Tristan Mc MD  1/8/2017

## 2017-01-08 NOTE — PLAN OF CARE
METABOLIC/FLUID AND ELECTROLYTES - ADULT    • Glucose maintained within prescribed range Not Progressing    • Electrolytes maintained within normal limits Not Progressing    • Hemodynamic stability and optimal renal function maintained Not Progressing

## 2017-01-08 NOTE — PROGRESS NOTES
[facility] Heart Cardiology  Progress Note    [place]aine [place] Patient Status:  Inpatient    1940 MRN [id number]   Location [facility] 5SW/SE Attending Stewart Varma MD   [facility] Day # 4 PCP Arelis Mcdaniel 01/08/2017   CL 98 01/08/2017   CO2 31 01/08/2017   * 01/08/2017   CA 8.4* 01/08/2017   ALB 3.6 12/27/2016   ALKPHO 45 12/27/2016   BILT 0.5 12/27/2016   TP 6.8 12/27/2016   AST 22 12/27/2016   ALT 16* 12/27/2016   PTT 30.3 01/06/2017   INR 1.0 01/0

## 2017-01-08 NOTE — PLAN OF CARE
METABOLIC/FLUID AND ELECTROLYTES - ADULT    • Glucose maintained within prescribed range Not Progressing

## 2017-01-08 NOTE — PROGRESS NOTES
[facility]    Progress Note    Martine Acosta Patient Status:  Inpatient    1940 MRN [id number]   Location [facility] 5SW/SE Attending Evi Hennessy MD   [facility] Day # 4 PCP Arelis Contreras MD     Subjective: TROP 0.05* 01/04/2017       Xr Chest Ap Portable  (cpt=71010)    1/7/2017  CONCLUSION:  1. Borderline cardiomegaly. Sternotomy changes. CABG. 2. Pulmonary artery enlargement/hypertension.  3. Mild perihilar and bibasilar alveolar and interstitial infiltra

## 2017-01-09 LAB
GLUCOSE BLDC GLUCOMTR-MCNC: 123 MG/DL (ref 70–99)
GLUCOSE BLDC GLUCOMTR-MCNC: 209 MG/DL (ref 70–99)
GLUCOSE BLDC GLUCOMTR-MCNC: 216 MG/DL (ref 70–99)
GLUCOSE BLDC GLUCOMTR-MCNC: 251 MG/DL (ref 70–99)

## 2017-01-09 PROCEDURE — 99233 SBSQ HOSP IP/OBS HIGH 50: CPT | Performed by: INTERNAL MEDICINE

## 2017-01-09 RX ORDER — IPRATROPIUM BROMIDE AND ALBUTEROL SULFATE 2.5; .5 MG/3ML; MG/3ML
3 SOLUTION RESPIRATORY (INHALATION)
Status: DISCONTINUED | OUTPATIENT
Start: 2017-01-10 | End: 2017-01-13

## 2017-01-09 RX ORDER — SODIUM CHLORIDE 0.9 % (FLUSH) 0.9 %
SYRINGE (ML) INJECTION
Status: COMPLETED
Start: 2017-01-09 | End: 2017-01-09

## 2017-01-09 NOTE — PROGRESS NOTES
[facility]  Progress Note    [facility] Patient Status:  Inpatient    1940 MRN [id number]   Location [facility] 5SW/SE Attending Jc Keyes MD   Hosp Day # 5 PCP Arelis Contreras MD     Assessment:    1.  Pneumon Subcutaneous TID CC   • ipratropium-albuterol  3 mL Nebulization Q6H   • predniSONE  40 mg Oral Daily with breakfast   • insulin detemir  10 Units Subcutaneous Q24H   • Fluticasone Furoate-Vilanterol  1 puff Inhalation Daily   • Umeclidinium Bromide  1 puf

## 2017-01-09 NOTE — PROGRESS NOTES
Pulmonary/Critical Care Follow Up Note    HPI:   Leah Irvin is a 68year old male with Patient presents with:  Dyspnea HECTOR SOB (respiratory)      PCP Arelis Contreras MD  Admission Attending Sherrie Paez 15 Day #5    Feeling puff, 2 puff, Inhalation, Q4H PRN  •  Atorvastatin Calcium (LIPITOR) tab 80 mg, 80 mg, Oral, Nightly  •  DiltiaZEM HCl ER Coated Beads (CARDIZEM CD) 24 hr cap 240 mg, 240 mg, Oral, Daily  •  Levothyroxine Sodium (SYNTHROID) tab 50 mcg, 50 mcg, Oral, Before

## 2017-01-09 NOTE — PLAN OF CARE
METABOLIC/FLUID AND ELECTROLYTES - ADULT    • Glucose maintained within prescribed range Not Progressing        RESPIRATORY - ADULT    • Achieves optimal ventilation and oxygenation Not Progressing          METABOLIC/FLUID AND ELECTROLYTES - ADULT    • [place]

## 2017-01-09 NOTE — PROGRESS NOTES
[facility]    Progress Note    Tuan Sharma Patient Status:  Inpatient    1940 MRN [id number]   Location [facility] 5SW/SE Attending Jas Flynn MD   [facility] Day # 5 PCP Arelis Contreras MD     Subjective:

## 2017-01-10 LAB
GLUCOSE BLDC GLUCOMTR-MCNC: 140 MG/DL (ref 70–99)
GLUCOSE BLDC GLUCOMTR-MCNC: 163 MG/DL (ref 70–99)
GLUCOSE BLDC GLUCOMTR-MCNC: 310 MG/DL (ref 70–99)
GLUCOSE BLDC GLUCOMTR-MCNC: 369 MG/DL (ref 70–99)

## 2017-01-10 PROCEDURE — 99232 SBSQ HOSP IP/OBS MODERATE 35: CPT | Performed by: INTERNAL MEDICINE

## 2017-01-10 RX ORDER — DILTIAZEM HYDROCHLORIDE 180 MG/1
180 CAPSULE, COATED, EXTENDED RELEASE ORAL NIGHTLY
Status: DISCONTINUED | OUTPATIENT
Start: 2017-01-10 | End: 2017-01-24

## 2017-01-10 RX ORDER — IPRATROPIUM BROMIDE AND ALBUTEROL SULFATE 2.5; .5 MG/3ML; MG/3ML
3 SOLUTION RESPIRATORY (INHALATION) EVERY 4 HOURS PRN
Status: DISCONTINUED | OUTPATIENT
Start: 2017-01-10 | End: 2017-01-24

## 2017-01-10 NOTE — PROGRESS NOTES
[place] [facility] - [facility]    Progress Note    Dion Artis Patient Status:  Inpatient    1940 MRN [id number]   Location [facility] 5SW/SE Attending Brenda Her MD   [facility] Day # 6 PCP Arelis Contreras MD     Subjective:

## 2017-01-10 NOTE — PLAN OF CARE
METABOLIC/FLUID AND ELECTROLYTES - ADULT    • Glucose maintained within prescribed range Progressing    • Electrolytes maintained within normal limits Progressing    • Hemodynamic stability and optimal renal function maintained Progressing        Patient C

## 2017-01-10 NOTE — PROGRESS NOTES
[facility]    Progress Note    Austin Carmichael Patient Status:  Inpatient    1940 MRN [id number]   Location [facility] 5SW/SE Attending Celeste Perez MD   [address] Day # 6 PCP Arelis Contreras MD     Subjective:

## 2017-01-11 LAB
GLUCOSE BLDC GLUCOMTR-MCNC: 189 MG/DL (ref 70–99)
GLUCOSE BLDC GLUCOMTR-MCNC: 229 MG/DL (ref 70–99)
GLUCOSE BLDC GLUCOMTR-MCNC: 268 MG/DL (ref 70–99)
GLUCOSE BLDC GLUCOMTR-MCNC: 322 MG/DL (ref 70–99)
GLUCOSE BLDC GLUCOMTR-MCNC: 350 MG/DL (ref 70–99)

## 2017-01-11 PROCEDURE — 99232 SBSQ HOSP IP/OBS MODERATE 35: CPT | Performed by: INTERNAL MEDICINE

## 2017-01-11 RX ORDER — SODIUM CHLORIDE 0.9 % (FLUSH) 0.9 %
SYRINGE (ML) INJECTION
Status: COMPLETED
Start: 2017-01-11 | End: 2017-01-11

## 2017-01-11 NOTE — PAYOR COMM NOTE
Karen Bre #[id number]  (67 year old M)       [facility] [id number]         Merlinda Sager, MD Physician Signed  Progress Notes 1/9/2017  4:00 PM      Expand All Collapse All    [facility]  Progress Note  Alen Martinez 2417 Patient Status:  In TROP  0.05*  01/04/2017    TROP  0.05*  01/04/2017    TROP  0.04*  12/27/2016         Medications:      •  insulin aspart   1-11 Units  Subcutaneous  TID CC    •  azithromycin   500 mg  Intravenous  Q24H    •  insulin aspart   8 Units  Subcutaneous  TID are normal.   Neurological: He is oriented to person, place, and time.         Assessment and Plan:        1. Acute on chronic hypoxemic respiratory failure    Copd / pneumonia      Chronically on 3 L NC at home / now on 6 L NC      2.  Acute pulmonary embo Psychiatric/Behavioral: Negative.          Objective:    Blood pressure 120/44, pulse 79, temperature 98.3 °F (36.8 °C), temperature source Oral, resp. rate 18, height 5' 5\" (1.651 m), weight 150 lb 6.4 oz (68.221 kg), SpO2 92 %.      Constitutional: He

## 2017-01-11 NOTE — PLAN OF CARE
Patient woke and started coughing uncontrollably and o2 saturation dropped to 83%, pt was SOB and diaphoretic. O2 remained at 4l/nc, patient O2 saturation started to increase after coughing stopped, neb tx given by respiratory.   Patient felt blood sugar ma

## 2017-01-11 NOTE — CM/SW NOTE
[facility]AS [facility] EDUARDO'S - NOW NOT [facility]  CLINICALS FAXED

## 2017-01-11 NOTE — PROGRESS NOTES
Pulmonary/Critical Care Follow Up Note    HPI:   Charlotte Mckeon is a 68year old male with Patient presents with:  Dyspnea HECTOR SOB (respiratory)      PCP Arelis Contreras MD  Admission Attending Sherrie Short 15 Day #7    Multipl mg, 10 mg, Oral, BID  •  Piperacillin Sod-Tazobactam So (ZOSYN) 3.375 g in dextrose 5 % 100 mL IVPB, 3.375 g, Intravenous, Q8H  •  Albuterol Sulfate  (90 BASE) MCG/ACT inhaler 2 puff, 2 puff, Inhalation, Q4H PRN  •  Atorvastatin Calcium (LIPITOR) ta

## 2017-01-11 NOTE — PROGRESS NOTES
[facility]  Progress Note    Zan Hallman Patient Status:  Inpatient    1940 MRN [id number]   Location [facility] 5SW/SE Attending Marie Frank MD   Hosp Day # 6 PCP Arelis Contreras MD     Assessment:    1.  Pneumon Subcutaneous TID CC   • azithromycin  500 mg Intravenous Q24H   • insulin aspart  8 Units Subcutaneous TID CC   • predniSONE  40 mg Oral Daily with breakfast   • insulin detemir  10 Units Subcutaneous Q24H   • Fluticasone Furoate-Vilanterol  1 puff Inhalat

## 2017-01-11 NOTE — PROGRESS NOTES
[facility]    Progress Note    Shonna Garza Patient Status:  Inpatient    1940 MRN [id number]   Location [facility] 5SW/SE Attending Miki Horowitz MD   [facility] Day # 7 PCP Arelis Contreras MD     Subjective:

## 2017-01-11 NOTE — PROGRESS NOTES
[place] [facility] [facility]    Progress Note    Charlotte Mckeon Patient Status:  Inpatient    1940 MRN [id number]   Location [facility] 5SW/SE Attending Nichole Alfaro MD   Hosp Day # 7 PCP Arelis Contreras MD         Assessment Oral Daily with breakfast   • insulin detemir  10 Units Subcutaneous Q24H   • Fluticasone Furoate-Vilanterol  1 puff Inhalation Daily   • Umeclidinium Bromide  1 puff Inhalation Daily   • apixaban  10 mg Oral BID   • piperacillin-tazobactam  3.375 g Dennise Grant

## 2017-01-12 ENCOUNTER — APPOINTMENT (OUTPATIENT)
Dept: GENERAL RADIOLOGY | Facility: HOSPITAL | Age: 77
DRG: 871 | End: 2017-01-12
Attending: INTERNAL MEDICINE
Payer: MEDICARE

## 2017-01-12 LAB
ANION GAP SERPL CALC-SCNC: 7 MMOL/L (ref 0–18)
BASOPHILS # BLD: 0 K/UL (ref 0–0.2)
BASOPHILS NFR BLD: 0 %
BUN SERPL-MCNC: 41 MG/DL (ref 8–20)
BUN/CREAT SERPL: 33.9 (ref 10–20)
CALCIUM SERPL-MCNC: 8.6 MG/DL (ref 8.5–10.5)
CHLORIDE SERPL-SCNC: 96 MMOL/L (ref 95–110)
CO2 SERPL-SCNC: 39 MMOL/L (ref 22–32)
CREAT SERPL-MCNC: 1.21 MG/DL (ref 0.5–1.5)
EOSINOPHIL # BLD: 0 K/UL (ref 0–0.7)
EOSINOPHIL NFR BLD: 0 %
ERYTHROCYTE [DISTWIDTH] IN BLOOD BY AUTOMATED COUNT: 15.6 % (ref 11–15)
GLUCOSE BLDC GLUCOMTR-MCNC: 174 MG/DL (ref 70–99)
GLUCOSE BLDC GLUCOMTR-MCNC: 201 MG/DL (ref 70–99)
GLUCOSE BLDC GLUCOMTR-MCNC: 253 MG/DL (ref 70–99)
GLUCOSE BLDC GLUCOMTR-MCNC: 336 MG/DL (ref 70–99)
GLUCOSE SERPL-MCNC: 198 MG/DL (ref 70–99)
HCT VFR BLD AUTO: 24.2 % (ref 41–52)
HGB BLD-MCNC: 7.6 G/DL (ref 13.5–17.5)
LYMPHOCYTES # BLD: 1 K/UL (ref 1–4)
LYMPHOCYTES NFR BLD: 14 %
MCH RBC QN AUTO: 26.3 PG (ref 27–32)
MCHC RBC AUTO-ENTMCNC: 31.3 G/DL (ref 32–37)
MCV RBC AUTO: 84 FL (ref 80–100)
MONOCYTES # BLD: 0.5 K/UL (ref 0–1)
MONOCYTES NFR BLD: 8 %
NEUTROPHILS # BLD AUTO: 5.5 K/UL (ref 1.8–7.7)
NEUTROPHILS NFR BLD: 78 %
OSMOLALITY UR CALC.SUM OF ELEC: 310 MOSM/KG (ref 275–295)
PLATELET # BLD AUTO: 187 K/UL (ref 140–400)
PMV BLD AUTO: 11 FL (ref 7.4–10.3)
POTASSIUM SERPL-SCNC: 3.8 MMOL/L (ref 3.3–5.1)
RBC # BLD AUTO: 2.88 M/UL (ref 4.5–5.9)
SODIUM SERPL-SCNC: 142 MMOL/L (ref 136–144)
WBC # BLD AUTO: 7.1 K/UL (ref 4–11)

## 2017-01-12 PROCEDURE — 71010 XR CHEST AP PORTABLE  (CPT=71010): CPT

## 2017-01-12 PROCEDURE — 99233 SBSQ HOSP IP/OBS HIGH 50: CPT | Performed by: INTERNAL MEDICINE

## 2017-01-12 RX ORDER — POTASSIUM CHLORIDE 20 MEQ/1
40 TABLET, EXTENDED RELEASE ORAL ONCE
Status: COMPLETED | OUTPATIENT
Start: 2017-01-12 | End: 2017-01-12

## 2017-01-12 NOTE — PLAN OF CARE
METABOLIC/FLUID AND ELECTROLYTES - ADULT    • Electrolytes maintained within normal limits Progressing    • Hemodynamic stability and optimal renal function maintained Progressing        Patient Centered Care    • Patient preferences are identified and int

## 2017-01-12 NOTE — PROGRESS NOTES
[facility]    Progress Note    Willa Dsouzagaurav Patient Status:  Inpatient    1940 MRN [id number]   Location [facility] 5SW/SE Attending Oren Baumann MD   [address] Day # 8 PCP Arelis Contreras MD     Subjective: Elpidio Botlon MD, Yolie Juarez MD  1/12/2017

## 2017-01-12 NOTE — PROGRESS NOTES
Pulmonary/Critical Care Follow Up Note    HPI:   Caesar Leo is a 68year old male with Patient presents with:  Dyspnea HECTOR SOB (respiratory)      PCP Arelis Contreras MD  Admission Attending Sherrie Kendrick 15 Day #8    [facility] Atorvastatin Calcium (LIPITOR) tab 80 mg, 80 mg, Oral, Nightly  •  DiltiaZEM HCl ER Coated Beads (CARDIZEM CD) 24 hr cap 240 mg, 240 mg, Oral, Daily  •  Levothyroxine Sodium (SYNTHROID) tab 50 mcg, 50 mcg, Oral, Before breakfast  •  lisinopril (PRINIVIL,ZE admission  slightly worse  Plan   Follow     DVT px  Plan NOAC    dispo  Ready for rehab  D/w with pt and family    Eli Clifton MD

## 2017-01-13 ENCOUNTER — APPOINTMENT (OUTPATIENT)
Dept: GENERAL RADIOLOGY | Facility: HOSPITAL | Age: 77
DRG: 871 | End: 2017-01-13
Attending: INTERNAL MEDICINE
Payer: MEDICARE

## 2017-01-13 LAB
GLUCOSE BLDC GLUCOMTR-MCNC: 134 MG/DL (ref 70–99)
GLUCOSE BLDC GLUCOMTR-MCNC: 211 MG/DL (ref 70–99)
GLUCOSE BLDC GLUCOMTR-MCNC: 323 MG/DL (ref 70–99)
GLUCOSE BLDC GLUCOMTR-MCNC: 434 MG/DL (ref 70–99)
POTASSIUM SERPL-SCNC: 4.3 MMOL/L (ref 3.3–5.1)

## 2017-01-13 PROCEDURE — 99233 SBSQ HOSP IP/OBS HIGH 50: CPT | Performed by: INTERNAL MEDICINE

## 2017-01-13 PROCEDURE — 71010 XR CHEST AP PORTABLE  (CPT=71010): CPT

## 2017-01-13 RX ORDER — METHYLPREDNISOLONE SODIUM SUCCINATE 40 MG/ML
40 INJECTION, POWDER, LYOPHILIZED, FOR SOLUTION INTRAMUSCULAR; INTRAVENOUS EVERY 12 HOURS
Status: DISCONTINUED | OUTPATIENT
Start: 2017-01-13 | End: 2017-01-18

## 2017-01-13 RX ORDER — SODIUM CHLORIDE 0.9 % (FLUSH) 0.9 %
SYRINGE (ML) INJECTION
Status: COMPLETED
Start: 2017-01-13 | End: 2017-01-13

## 2017-01-13 RX ORDER — IPRATROPIUM BROMIDE AND ALBUTEROL SULFATE 2.5; .5 MG/3ML; MG/3ML
3 SOLUTION RESPIRATORY (INHALATION)
Status: DISCONTINUED | OUTPATIENT
Start: 2017-01-13 | End: 2017-01-19

## 2017-01-13 NOTE — DISCHARGE PLANNING
KELLY received for dc planning. MD notation states pt will need rehab, possibly [facility]. SW initiated referral to there and provided the pt with the snf list, per his request. DON screen requested to [facility].  Therapy consults are pending and will be needed for

## 2017-01-13 NOTE — DISCHARGE PLANNING
[facility] and [facility] snf have both denied the pt due to insurance network. Family has the snf list to consider. Pt cannot dc to rehab until insurance approves. He has been unable to do therapy due to shortness of breath.     brianna enrique,LITOW [id number]

## 2017-01-13 NOTE — PROGRESS NOTES
[facility] [place] [facility]    [facility] Note    Lina Ferguson Patient Status:  Inpatient    1940 MRN [id number]   Location [facility] 5SW/SE Attending Oscar Schumacher MD   [facility] Day # 9 PCP Arelis Contreras MD     Subjective: admission. Small left effusion. 2. Postop changes right lung. 3. Borderline heart size post CABG.                  Nitza Ling MD, Vanessa Floyd MD  1/13/2017

## 2017-01-13 NOTE — DISCHARGE PLANNING
[facility] I[facility] is not in network. JANNA initiated tentative referral to [facility], awaiting review.     DEZ arora [id number]

## 2017-01-13 NOTE — PROGRESS NOTES
Pulmonary/Critical Care Follow Up Note    HPI:   Ann-Marie Caputo is a 68year old male with Patient presents with:  Dyspnea HECTOR SOB (respiratory)      PCP Arelis Contreras MD  Admission Attending Sherrie Fernández 15 Day #9    russell Atorvastatin Calcium (LIPITOR) tab 80 mg, 80 mg, Oral, Nightly  •  DiltiaZEM HCl ER Coated Beads (CARDIZEM CD) 24 hr cap 240 mg, 240 mg, Oral, Daily  •  Levothyroxine Sodium (SYNTHROID) tab 50 mcg, 50 mcg, Oral, Before breakfast  •  lisinopril (PRINIVIL,ZE

## 2017-01-14 ENCOUNTER — APPOINTMENT (OUTPATIENT)
Dept: GENERAL RADIOLOGY | Facility: HOSPITAL | Age: 77
DRG: 871 | End: 2017-01-14
Attending: INTERNAL MEDICINE
Payer: MEDICARE

## 2017-01-14 LAB
ALBUMIN SERPL BCP-MCNC: 2.6 G/DL (ref 3.5–4.8)
ALBUMIN/GLOB SERPL: 0.9 {RATIO} (ref 1–2)
ALP SERPL-CCNC: 46 U/L (ref 32–100)
ALT SERPL-CCNC: 40 U/L (ref 17–63)
ANION GAP SERPL CALC-SCNC: 8 MMOL/L (ref 0–18)
AST SERPL-CCNC: 25 U/L (ref 15–41)
BASOPHILS # BLD: 0 K/UL (ref 0–0.2)
BASOPHILS NFR BLD: 0 %
BILIRUB SERPL-MCNC: 0.7 MG/DL (ref 0.3–1.2)
BUN SERPL-MCNC: 37 MG/DL (ref 8–20)
BUN/CREAT SERPL: 35.2 (ref 10–20)
CALCIUM SERPL-MCNC: 9.2 MG/DL (ref 8.5–10.5)
CHLORIDE SERPL-SCNC: 95 MMOL/L (ref 95–110)
CO2 SERPL-SCNC: 38 MMOL/L (ref 22–32)
CREAT SERPL-MCNC: 1.05 MG/DL (ref 0.5–1.5)
EOSINOPHIL # BLD: 0 K/UL (ref 0–0.7)
EOSINOPHIL NFR BLD: 0 %
ERYTHROCYTE [DISTWIDTH] IN BLOOD BY AUTOMATED COUNT: 15.6 % (ref 11–15)
GLOBULIN PLAS-MCNC: 3 G/DL (ref 2.5–3.7)
GLUCOSE BLDC GLUCOMTR-MCNC: 280 MG/DL (ref 70–99)
GLUCOSE BLDC GLUCOMTR-MCNC: 403 MG/DL (ref 70–99)
GLUCOSE BLDC GLUCOMTR-MCNC: 456 MG/DL (ref 70–99)
GLUCOSE SERPL-MCNC: 265 MG/DL (ref 70–99)
HCT VFR BLD AUTO: 26.2 % (ref 41–52)
HGB BLD-MCNC: 8.2 G/DL (ref 13.5–17.5)
LYMPHOCYTES # BLD: 0.5 K/UL (ref 1–4)
LYMPHOCYTES NFR BLD: 7 %
MCH RBC QN AUTO: 26.5 PG (ref 27–32)
MCHC RBC AUTO-ENTMCNC: 31.3 G/DL (ref 32–37)
MCV RBC AUTO: 84.8 FL (ref 80–100)
MONOCYTES # BLD: 0.1 K/UL (ref 0–1)
MONOCYTES NFR BLD: 1 %
NEUTROPHILS # BLD AUTO: 6.5 K/UL (ref 1.8–7.7)
NEUTROPHILS NFR BLD: 92 %
OSMOLALITY UR CALC.SUM OF ELEC: 310 MOSM/KG (ref 275–295)
PLATELET # BLD AUTO: 206 K/UL (ref 140–400)
PMV BLD AUTO: 11.2 FL (ref 7.4–10.3)
POTASSIUM SERPL-SCNC: 5.3 MMOL/L (ref 3.3–5.1)
PROT SERPL-MCNC: 5.6 G/DL (ref 5.9–8.4)
RBC # BLD AUTO: 3.09 M/UL (ref 4.5–5.9)
SODIUM SERPL-SCNC: 141 MMOL/L (ref 136–144)
WBC # BLD AUTO: 7 K/UL (ref 4–11)

## 2017-01-14 PROCEDURE — 71010 XR CHEST AP PORTABLE  (CPT=71010): CPT

## 2017-01-14 PROCEDURE — 99232 SBSQ HOSP IP/OBS MODERATE 35: CPT | Performed by: INTERNAL MEDICINE

## 2017-01-14 NOTE — PROGRESS NOTES
[facility]     Progress Note        Shona Fay Patient Status:  Inpatient    1940 MRN [id number]   Location [facility] 1SW Attending Sheldon Mario MD   [facility] Day # 10 PCP Arelis Contreras MD       [employer] Atorvastatin Calcium (LIPITOR) tab 80 mg 80 mg Oral Nightly   DiltiaZEM HCl ER Coated Beads (CARDIZEM CD) 24 hr cap 240 mg 240 mg Oral Daily   Levothyroxine Sodium (SYNTHROID) tab 50 mcg 50 mcg Oral Before breakfast   lisinopril (PRINIVIL,ZESTRIL) tab 5 1/04/2017 at 7:15     Approved by (CST): Robbie Christine MD on 1/04/2017 at 7:19          1/4/2017  CONCLUSION:  1. Perhaps slight worsening of left basilar pneumonia from December 29, 2016. 2. The rest of the findings are relatively stable.  3. Cardiomegal leukocytosis (12,500). 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 History of CAD post CABG in 2009. TECHNIQUE:   Portable AP view.    FINDINGS: contours are grossly stable. LUNGS: Patchy opacities in bilateral lung bases are now seen, may represent atelectasis or consolidation. PLEURA: Blunting of the costophrenic angle bilaterally suggest small bilateral pleural effusions.     Dictated by (CST): S mm right middle lobe nodule image 104 series 4 adjacent to a patchy region of peripheral opacification. Moderate centrilobular emphysema. Thickening of lower lobe bronchi and bronchioles.  Trachea and mainstem bronchi normal. CHEST WALL: Normal.  No mass or by Dipesh Castorena. Chin      Assessment   1. Acute on chronic respiratory failure  2. Acute pulmonary embolism  3. Bilateral pneumonia  4. Pulmonary hypertension  5. COPD with acute exacerbation  6. CHF  7.   Pulmonary nodule         Plan   -Patient dyspnea not

## 2017-01-14 NOTE — PHYSICAL THERAPY NOTE
Chart reviewed   Discussed with RN pt status   This AM  Pt still on BIPAP and not approp for therapy at this time    RN asking PT to check in possibly later this PM

## 2017-01-14 NOTE — PROGRESS NOTES
[place] [facility]    Progress Note    Franchesca Ped Patient Status:  Inpatient    1940 MRN [id number]   Location [facility] 2W/SW Attending Justin Aguilar MD   [facility] Day # 10 PCP Arelis Contreras MD     Subjective: lung bases suggesting atelectasis or scar. Xr Chest Ap Portable  (cpt=71010)    1/13/2017  CONCLUSION:  1. Morphologic changes of COPD/emphysema. 2.  Prior CABG 3. No acute cardiopulmonary abnormality.                Lis Weber MD, Eric Ordaz MD  1/14/20

## 2017-01-14 NOTE — OCCUPATIONAL THERAPY NOTE
Patient transferred to CCU overnight for 'increased work or breathing.' He is still on bipap this am, and not appropriate for OT evaluation. Will reattempt as appropriate.

## 2017-01-15 ENCOUNTER — APPOINTMENT (OUTPATIENT)
Dept: CT IMAGING | Facility: HOSPITAL | Age: 77
DRG: 871 | End: 2017-01-15
Attending: INTERNAL MEDICINE
Payer: MEDICARE

## 2017-01-15 LAB
BUN SERPL-MCNC: 51 MG/DL (ref 8–20)
CREAT SERPL-MCNC: 1.31 MG/DL (ref 0.5–1.5)
GLUCOSE BLDC GLUCOMTR-MCNC: 333 MG/DL (ref 70–99)
GLUCOSE BLDC GLUCOMTR-MCNC: 347 MG/DL (ref 70–99)
GLUCOSE BLDC GLUCOMTR-MCNC: 375 MG/DL (ref 70–99)
GLUCOSE BLDC GLUCOMTR-MCNC: 432 MG/DL (ref 70–99)

## 2017-01-15 PROCEDURE — 99233 SBSQ HOSP IP/OBS HIGH 50: CPT | Performed by: INTERNAL MEDICINE

## 2017-01-15 PROCEDURE — 71260 CT THORAX DX C+: CPT

## 2017-01-15 RX ORDER — SODIUM CHLORIDE 0.9 % (FLUSH) 0.9 %
10 SYRINGE (ML) INJECTION AS NEEDED
Status: DISCONTINUED | OUTPATIENT
Start: 2017-01-15 | End: 2017-01-24

## 2017-01-15 RX ORDER — SODIUM CHLORIDE 9 MG/ML
INJECTION, SOLUTION INTRAVENOUS
Status: COMPLETED
Start: 2017-01-15 | End: 2017-01-15

## 2017-01-15 RX ORDER — SODIUM CHLORIDE 0.9 % (FLUSH) 0.9 %
SYRINGE (ML) INJECTION
Status: COMPLETED
Start: 2017-01-15 | End: 2017-01-15

## 2017-01-15 NOTE — OCCUPATIONAL THERAPY NOTE
OCCUPATIONAL THERAPY EVALUATION - INPATIENT     Room Number: 208/208-A  Evaluation Date: 1/15/2017  Type of Evaluation: Initial  Presenting Problem: pneumonia    Physician Order: IP Consult to Occupational Therapy  Reason for Therapy: ADL/IADL Dysfunction combo;Shower chair;Grab bar  Other Equipment:  (owns straight cane but does not use)    Occupation/Status: retired     Drives: Yes  Patient Regularly Uses: Home O2 (3 L)    Stairs in Home: 1 step to enter  Use of Assistive Device(s): none    Prior Level of Dressing: setup  Lower Extremity Dressing: min A      Education Provided: Pt educated on OT role and plan of care. Pt educated on importance of energy conservation techniques.    Patient End of Session: Up in chair;Needs met;Call light within reach;RN awar

## 2017-01-15 NOTE — PROGRESS NOTES
Notified Dr. Spence Shall via telephone of HS sugar 456. Obtained order for increased one-time insulin dosage. See eMAR. OK by MD to recheck in AM per routine.

## 2017-01-15 NOTE — PHYSICAL THERAPY NOTE
PHYSICAL THERAPY EVALUATION - INPATIENT     Room Number: 208/208-A  Evaluation Date: 1/15/2017  Type of Evaluation: Initial Evaluation  Physician Order: PT Eval and Treat    Presenting Problem: Shortness of breath  Reason for Therapy: Mobility Dysfunct NEUROLOGICAL FINDINGS                      ACTIVITY TOLERANCE  Patient on 75% 02,  desaturated to 85% with bed-chair transfer but recovered to 93% after taking a couple of deep breaths.      AM-PAC '6-Clicks' INPATIENT SHORT FORM - BASI address the above deficits to assist patient in returning to prior level of function. DISCHARGE RECOMMENDATIONS  PT Discharge Recommendations: Cont skilled therapy in a supervised setting    PLAN  PT Treatment Plan: Bed mobility; Endurance; Energy conserv

## 2017-01-15 NOTE — PROGRESS NOTES
Pulmonary/Critical Care Follow Up Note    HPI:   Graciela Keane is a 68year old male with Patient presents with:  Dyspnea HECTOR SOB (respiratory)      PCP Arelis Contreras MD  Admission Attending Sherrie Lopez 15 Day #11    tete Inhalation, Q4H PRN  •  Atorvastatin Calcium (LIPITOR) tab 80 mg, 80 mg, Oral, Nightly  •  DiltiaZEM HCl ER Coated Beads (CARDIZEM CD) 24 hr cap 240 mg, 240 mg, Oral, Daily  •  Levothyroxine Sodium (SYNTHROID) tab 50 mcg, 50 mcg, Oral, Before breakfast  •

## 2017-01-15 NOTE — PROGRESS NOTES
[facility]    Progress Note    Leah Bile Patient Status:  Inpatient    1940 MRN [id number]   Location [facility] 2W/SW Attending Josee Jean MD   [facility] Day # 6 PCP Arelis Contreras MD     Subjective: 01/08/2017   TROP 0.05* 01/04/2017       Xr Chest Ap Portable  (cpt=71010)    1/14/2017  CONCLUSION:  1. Exam again demonstrating changes of COPD/emphysema. There is also superimposed bronchitis. 2.  Prior CABG.   3.  There are linear opacities of the estella

## 2017-01-16 LAB
ANION GAP SERPL CALC-SCNC: 6 MMOL/L (ref 0–18)
BASOPHILS # BLD: 0 K/UL (ref 0–0.2)
BASOPHILS NFR BLD: 0 %
BUN SERPL-MCNC: 58 MG/DL (ref 8–20)
BUN/CREAT SERPL: 40.8 (ref 10–20)
CALCIUM SERPL-MCNC: 8.9 MG/DL (ref 8.5–10.5)
CHLORIDE SERPL-SCNC: 94 MMOL/L (ref 95–110)
CO2 SERPL-SCNC: 37 MMOL/L (ref 22–32)
CREAT SERPL-MCNC: 1.42 MG/DL (ref 0.5–1.5)
EOSINOPHIL # BLD: 0 K/UL (ref 0–0.7)
EOSINOPHIL NFR BLD: 0 %
ERYTHROCYTE [DISTWIDTH] IN BLOOD BY AUTOMATED COUNT: 16 % (ref 11–15)
GLUCOSE BLDC GLUCOMTR-MCNC: 362 MG/DL (ref 70–99)
GLUCOSE BLDC GLUCOMTR-MCNC: 374 MG/DL (ref 70–99)
GLUCOSE BLDC GLUCOMTR-MCNC: 469 MG/DL (ref 70–99)
GLUCOSE BLDC GLUCOMTR-MCNC: 471 MG/DL (ref 70–99)
GLUCOSE BLDC GLUCOMTR-MCNC: >500 MG/DL (ref 70–99)
GLUCOSE SERPL-MCNC: 337 MG/DL (ref 70–99)
HCT VFR BLD AUTO: 24.5 % (ref 41–52)
HGB BLD-MCNC: 7.7 G/DL (ref 13.5–17.5)
LYMPHOCYTES # BLD: 0.2 K/UL (ref 1–4)
LYMPHOCYTES NFR BLD: 2 %
MCH RBC QN AUTO: 26.5 PG (ref 27–32)
MCHC RBC AUTO-ENTMCNC: 31.2 G/DL (ref 32–37)
MCV RBC AUTO: 85 FL (ref 80–100)
MONOCYTES # BLD: 0.2 K/UL (ref 0–1)
MONOCYTES NFR BLD: 2 %
NEUTROPHILS # BLD AUTO: 9.7 K/UL (ref 1.8–7.7)
NEUTROPHILS NFR BLD: 96 %
OSMOLALITY UR CALC.SUM OF ELEC: 313 MOSM/KG (ref 275–295)
PLATELET # BLD AUTO: 178 K/UL (ref 140–400)
PMV BLD AUTO: 11.5 FL (ref 7.4–10.3)
POTASSIUM SERPL-SCNC: 5.3 MMOL/L (ref 3.3–5.1)
RBC # BLD AUTO: 2.89 M/UL (ref 4.5–5.9)
SODIUM SERPL-SCNC: 137 MMOL/L (ref 136–144)
WBC # BLD AUTO: 10.1 K/UL (ref 4–11)

## 2017-01-16 PROCEDURE — 99233 SBSQ HOSP IP/OBS HIGH 50: CPT | Performed by: INTERNAL MEDICINE

## 2017-01-16 NOTE — PROGRESS NOTES
Pulmonary/Critical Care Follow Up Note    HPI:   Buzz Hill is a 68year old male with Patient presents with:  Dyspnea HECTOR SOB (respiratory)      PCP Arelis Contreras MD  Admission Attending Sherrie Li 15 Day #12    Still Daily  •  Umeclidinium Bromide (INCRUSE ELLIPTA) 62.5 MCG/INH inhaler 1 puff, 1 puff, Inhalation, Daily  •  dextrose injection 50 mL, 50 mL, Intravenous, PRN  •  apixaban (ELIQUIS) tab 10 mg, 10 mg, Oral, BID  •  Albuterol Sulfate  (90 BASE) MCG/ACT steroids                   ICS/LABA  [place] [facility]     Pulmonary Nodule  6mm  Near current PNA  Plan f/u CT in 3 mo     CAD  + CP  Mild + trop  No worrisome EKG changes  Plan  ACE-I   Statin   BB   ?  ASA (pt on NOAC)    Brief SVT/SNVT  H/o af

## 2017-01-16 NOTE — PLAN OF CARE
METABOLIC/FLUID AND ELECTROLYTES - ADULT    • Glucose maintained within prescribed range Not Progressing          METABOLIC/FLUID AND ELECTROLYTES - ADULT    • Hemodynamic stability and optimal renal function maintained Progressing        RESPIRATORY - RAFY

## 2017-01-16 NOTE — PLAN OF CARE
METABOLIC/FLUID AND ELECTROLYTES - ADULT    • Glucose maintained within prescribed range    ACCU CHECK AC&HS. BLOOS GLUCOSE HIGH 300S DUE TO RECEIVING IV STEROIDS.  MD AWARE Progressing        Patient Centered Care    • Patient preferences are identified and

## 2017-01-16 NOTE — PROGRESS NOTES
Dr. Heather Thomson notified of patient's HS blood sugar of 432. Novolog flexpen order received for 14 units SQ. Please see MAR.  MD finch with next BS check in AM.

## 2017-01-16 NOTE — PROGRESS NOTES
[place] [facility]    Progress Note    Tasneem Ford Patient Status:  Inpatient    1940 MRN [id number]   Location [facility] 2W/SW Attending Hema Ramachandran MD   [facility] Day # 12 PCP Arelis Contreras MD     Subjective: pulmonary embolus in the proximal subsegmental posterior basal left lower lobe pulmonary artery branch. 2. Multifocal bilateral lower lobe pneumonia, slightly intervally improved.   3. Mild nodular thickening along the right major fissure may represent an

## 2017-01-16 NOTE — PROGRESS NOTES
Dr. Norm Chavira notified of patient's AM blood sugar of 374.  Novolog flexpen order received for 14 units, please see orders and MAR.

## 2017-01-17 LAB
BASOPHILS # BLD: 0 K/UL (ref 0–0.2)
BASOPHILS NFR BLD: 0 %
EOSINOPHIL # BLD: 0 K/UL (ref 0–0.7)
EOSINOPHIL NFR BLD: 0 %
ERYTHROCYTE [DISTWIDTH] IN BLOOD BY AUTOMATED COUNT: 15.7 % (ref 11–15)
GLUCOSE BLDC GLUCOMTR-MCNC: 152 MG/DL (ref 70–99)
GLUCOSE BLDC GLUCOMTR-MCNC: 253 MG/DL (ref 70–99)
GLUCOSE BLDC GLUCOMTR-MCNC: 294 MG/DL (ref 70–99)
GLUCOSE BLDC GLUCOMTR-MCNC: 302 MG/DL (ref 70–99)
GLUCOSE BLDC GLUCOMTR-MCNC: 333 MG/DL (ref 70–99)
HCT VFR BLD AUTO: 23.8 % (ref 41–52)
HGB BLD-MCNC: 7.5 G/DL (ref 13.5–17.5)
LYMPHOCYTES # BLD: 0.6 K/UL (ref 1–4)
LYMPHOCYTES NFR BLD: 4 %
MCH RBC QN AUTO: 26.2 PG (ref 27–32)
MCHC RBC AUTO-ENTMCNC: 31.3 G/DL (ref 32–37)
MCV RBC AUTO: 83.7 FL (ref 80–100)
MONOCYTES # BLD: 1.1 K/UL (ref 0–1)
MONOCYTES NFR BLD: 7 %
NEUTROPHILS # BLD AUTO: 16 K/UL (ref 1.8–7.7)
NEUTROPHILS NFR BLD: 90 %
PLATELET # BLD AUTO: 168 K/UL (ref 140–400)
PMV BLD AUTO: 11.9 FL (ref 7.4–10.3)
PROCALCITONIN SERPL-MCNC: <0.05 NG/ML (ref ?–0.11)
RBC # BLD AUTO: 2.84 M/UL (ref 4.5–5.9)
WBC # BLD AUTO: 17.8 K/UL (ref 4–11)

## 2017-01-17 PROCEDURE — 99291 CRITICAL CARE FIRST HOUR: CPT | Performed by: INTERNAL MEDICINE

## 2017-01-17 RX ORDER — PANTOPRAZOLE SODIUM 40 MG/1
40 TABLET, DELAYED RELEASE ORAL
Status: DISCONTINUED | OUTPATIENT
Start: 2017-01-18 | End: 2017-01-24

## 2017-01-17 RX ORDER — SODIUM CHLORIDE 450 MG/100ML
INJECTION, SOLUTION INTRAVENOUS CONTINUOUS
Status: DISCONTINUED | OUTPATIENT
Start: 2017-01-17 | End: 2017-01-20

## 2017-01-17 RX ORDER — ACETYLCYSTEINE 200 MG/ML
2 SOLUTION ORAL; RESPIRATORY (INHALATION) 2 TIMES DAILY
Status: DISCONTINUED | OUTPATIENT
Start: 2017-01-17 | End: 2017-01-21

## 2017-01-17 NOTE — PHYSICAL THERAPY NOTE
Chart reviewed     Pt with small PE on Eliquis       Discussed with RN  Not approp for OOB at this time  ----PT eval yesterday with poor tolerance OOB -----  Pt presently on 40 L O2 --note  forced O2 NC on pt this is  connected to machine in room with many

## 2017-01-17 NOTE — PHYSICAL THERAPY NOTE
Chart reviewed   RN reporting to therapy not approp for PT at this time due to present respitory status    PT is following and will reattempt and follow as approp

## 2017-01-17 NOTE — PLAN OF CARE
Problem: METABOLIC/FLUID AND ELECTROLYTES - ADULT  Goal: Glucose maintained within prescribed range  INTERVENTIONS:  - Monitor Blood Glucose as ordered  - Assess for signs and symptoms of hyperglycemia and hypoglycemia  - Administer ordered medications to

## 2017-01-17 NOTE — PROGRESS NOTES
[facility]    Progress Note    Adrian Moya Patient Status:  Inpatient    1940 MRN [id number]   Location [facility] 2W/SW Attending Delio Carey MD   [facility] Day # 15 PCP Arelis Contreras MD     Subjective: pulmonary embolus in the proximal subsegmental posterior basal left lower lobe pulmonary artery branch. 2. Multifocal bilateral lower lobe pneumonia, slightly intervally improved.   3. Mild nodular thickening along the right major fissure may represent an

## 2017-01-17 NOTE — PLAN OF CARE
Hgb 7.7.  Dr. Shayy Fernandez ordered 1Unit RBC. Pt declined.  MD aware.  Will recheck CBC tomorrow am.

## 2017-01-17 NOTE — PROGRESS NOTES
[facility] dosing service    Initial Pharmacokinetic Consult for Vancomycin Dosing     Kane Verma is a 68year old male who is being treated for pneumonia. Pharmacy has been asked to dose Vancomycin by Dr. Jennie West. He has No Known Allergies.     Ot

## 2017-01-17 NOTE — PROGRESS NOTES
[facility]    Progress Note    Caesar Handler Patient Status:  Inpatient    1940 MRN [id number]   Location [facility] 2W/SW Attending Cindy Padilla MD   [facility] Day # 15 PCP Arelis Contreras MD     Subjective: renal failure     Avoid diuretics     IV fluid since poor oral intake     8- anemia   Stable / f/u in am     Poor prognosis   35 min cct   Will follow       Results:     Lab Results  Component Value Date   WBC 17.8* 01/17/2017   HGB 7.5* 01/17/2017   HCT 2

## 2017-01-17 NOTE — PLAN OF CARE
Hgb 7.7.  Dr. Shantanu Noble ordered 1Unit RBC. Pt declined.  MD aware.  Will recheck CBC tomorrow am.

## 2017-01-18 ENCOUNTER — APPOINTMENT (OUTPATIENT)
Dept: GENERAL RADIOLOGY | Facility: HOSPITAL | Age: 77
DRG: 871 | End: 2017-01-18
Attending: INTERNAL MEDICINE
Payer: MEDICARE

## 2017-01-18 LAB
ANION GAP SERPL CALC-SCNC: 5 MMOL/L (ref 0–18)
BASOPHILS # BLD: 0 K/UL (ref 0–0.2)
BASOPHILS NFR BLD: 0 %
BUN SERPL-MCNC: 68 MG/DL (ref 8–20)
BUN/CREAT SERPL: 48.6 (ref 10–20)
CALCIUM SERPL-MCNC: 8.7 MG/DL (ref 8.5–10.5)
CHLORIDE SERPL-SCNC: 98 MMOL/L (ref 95–110)
CO2 SERPL-SCNC: 37 MMOL/L (ref 22–32)
CREAT SERPL-MCNC: 1.4 MG/DL (ref 0.5–1.5)
EOSINOPHIL # BLD: 0 K/UL (ref 0–0.7)
EOSINOPHIL NFR BLD: 0 %
ERYTHROCYTE [DISTWIDTH] IN BLOOD BY AUTOMATED COUNT: 16 % (ref 11–15)
GLUCOSE BLDC GLUCOMTR-MCNC: 262 MG/DL (ref 70–99)
GLUCOSE BLDC GLUCOMTR-MCNC: 268 MG/DL (ref 70–99)
GLUCOSE BLDC GLUCOMTR-MCNC: 296 MG/DL (ref 70–99)
GLUCOSE BLDC GLUCOMTR-MCNC: 298 MG/DL (ref 70–99)
GLUCOSE SERPL-MCNC: 253 MG/DL (ref 70–99)
HCT VFR BLD AUTO: 23 % (ref 41–52)
HGB BLD-MCNC: 7.2 G/DL (ref 13.5–17.5)
LYMPHOCYTES # BLD: 0.3 K/UL (ref 1–4)
LYMPHOCYTES NFR BLD: 2 %
MCH RBC QN AUTO: 26.2 PG (ref 27–32)
MCHC RBC AUTO-ENTMCNC: 31.4 G/DL (ref 32–37)
MCV RBC AUTO: 83.6 FL (ref 80–100)
MONOCYTES # BLD: 0.2 K/UL (ref 0–1)
MONOCYTES NFR BLD: 2 %
NEUTROPHILS # BLD AUTO: 15 K/UL (ref 1.8–7.7)
NEUTROPHILS NFR BLD: 96 %
OSMOLALITY UR CALC.SUM OF ELEC: 318 MOSM/KG (ref 275–295)
PLATELET # BLD AUTO: 136 K/UL (ref 140–400)
PMV BLD AUTO: 12.1 FL (ref 7.4–10.3)
POTASSIUM SERPL-SCNC: 5.4 MMOL/L (ref 3.3–5.1)
RBC # BLD AUTO: 2.75 M/UL (ref 4.5–5.9)
SODIUM SERPL-SCNC: 140 MMOL/L (ref 136–144)
WBC # BLD AUTO: 15.5 K/UL (ref 4–11)

## 2017-01-18 PROCEDURE — 99233 SBSQ HOSP IP/OBS HIGH 50: CPT | Performed by: INTERNAL MEDICINE

## 2017-01-18 PROCEDURE — 71010 XR CHEST AP PORTABLE  (CPT=71010): CPT

## 2017-01-18 RX ORDER — METHYLPREDNISOLONE SODIUM SUCCINATE 40 MG/ML
30 INJECTION, POWDER, LYOPHILIZED, FOR SOLUTION INTRAMUSCULAR; INTRAVENOUS EVERY 12 HOURS
Status: COMPLETED | OUTPATIENT
Start: 2017-01-18 | End: 2017-01-20

## 2017-01-18 NOTE — OCCUPATIONAL THERAPY NOTE
.. OCCUPATIONAL THERAPY TREATMENT NOTE - INPATIENT     Room Number: 318/933-L     Number of Visits to Meet Established Goals: 6    Presenting Problem: Pneumonia    Problem List  Principal Problem:    Community acquired pneumonia  Active Problems:    Hypergl Scale): 40.22  CMS Modifier (G-Code): CK    FUNCTIONAL TRANSFER ASSESSMENT  Supine to Sit : Supervision  Sit to Stand: Minimum assistance    Toilet Transfer: NT  Shower Transfer: NT  Chair Transfer: min  With HHA    Bedroom Mobility: SBA    FUNCTIONAL ADL

## 2017-01-18 NOTE — PROGRESS NOTES
Pulmonary/Critical Care Follow Up Note    HPI:   Buzz Hill is a 68year old male with Patient presents with:  Dyspnea HECTOR SOB (respiratory)      PCP Arelis Contreras MD  Admission Attending Sherrie Li 15 Day #14    Still insulin aspart (NOVOLOG) 100 UNIT/ML flexpen 1-11 Units, 1-11 Units, Subcutaneous, TID CC  •  guaiFENesin (ROBITUSSIN) 100 MG/5ML solution 100-200 mg, 100-200 mg, Oral, Q6H PRN  •  Fluticasone Furoate-Vilanterol (BREO ELLIPTA) 100-25 MCG/INH inhaler 1 puff tolerated   Not clear if any other therapeutic targerts     COPD  + AECOPD  Plan      Nebs               steroids                   ICS/LABA              LAMA   Daliresp     Pulmonary Nodule  6mm  Near current PNA  Plan f/u CT in 3 mo     CAD  + CP  Mild +

## 2017-01-18 NOTE — PHYSICAL THERAPY NOTE
PHYSICAL THERAPY TREATMENT NOTE - INPATIENT    Room Number: 944/515-A       Presenting Problem: Shortness of breath    Problem List  Principal Problem:    Community acquired pneumonia  Active Problems:    Hyperglycemia    Anemia    Azotemia    COPD exacer (G-Code): CK    FUNCTIONAL ABILITY STATUS  Gait Assessment                       Bed Mobility:SBA    Transfers:CGA    Skilled Therapy Provided: transfer training & therex    THERAPEUTIC EXERCISES  Lower Extremity Ankle pumps  Glut sets  Heel slides  Knee e

## 2017-01-18 NOTE — PROGRESS NOTES
[facility]    Progress Note    Charlotte Mckeon Patient Status:  Inpatient    1940 MRN [id number]   Location [facility] 2W/SW Attending Nichole Alfaro MD   [facility] Day # 15 PCP Arelis Contreras MD     Subjective: CABG. 2. Prominent bibasilar markings. Developing left basilar consolidation/alveolitis.  3. Recommend followup to resolution                 Satnam Patterson MD, Amos Avendaño MD  1/18/2017

## 2017-01-18 NOTE — PLAN OF CARE
Diabetes/Glucose Control    • Glucose maintained within prescribed range Not Progressing           Blood sugar ranging from 150's to 300's. Patient on steroids. On         Scheduled dose of Novolog with meals plus per correction scale and         Levemir.

## 2017-01-18 NOTE — PLAN OF CARE
Diabetes/Glucose Control    • Glucose maintained within prescribed range Progressing        METABOLIC/FLUID AND ELECTROLYTES - ADULT    • Glucose maintained within prescribed range Progressing    • Electrolytes maintained within normal limits Progressing

## 2017-01-19 LAB
GLUCOSE BLDC GLUCOMTR-MCNC: 278 MG/DL (ref 70–99)
GLUCOSE BLDC GLUCOMTR-MCNC: 304 MG/DL (ref 70–99)

## 2017-01-19 PROCEDURE — 99233 SBSQ HOSP IP/OBS HIGH 50: CPT | Performed by: INTERNAL MEDICINE

## 2017-01-19 RX ORDER — IPRATROPIUM BROMIDE AND ALBUTEROL SULFATE 2.5; .5 MG/3ML; MG/3ML
3 SOLUTION RESPIRATORY (INHALATION)
Status: DISCONTINUED | OUTPATIENT
Start: 2017-01-19 | End: 2017-01-24

## 2017-01-19 RX ORDER — SODIUM CHLORIDE 0.9 % (FLUSH) 0.9 %
SYRINGE (ML) INJECTION
Status: COMPLETED
Start: 2017-01-19 | End: 2017-01-19

## 2017-01-19 NOTE — PROGRESS NOTES
Pulmonary/Critical Care Follow Up Note    HPI:   Nate Bertrand is a 68year old male with Patient presents with:  Dyspnea HECTOR SOB (respiratory)      PCP Arelis Contreras MD  Admission Attending Heaven Marks, [facility] Day #15    Still Blocker)  •  insulin aspart (NOVOLOG) 100 UNIT/ML flexpen 1-11 Units, 1-11 Units, Subcutaneous, TID CC  •  guaiFENesin (ROBITUSSIN) 100 MG/5ML solution 100-200 mg, 100-200 mg, Oral, Q6H PRN  •  Fluticasone Furoate-Vilanterol (BREO ELLIPTA) 100-25 MCG/INH i and stop     COPD  + AECOPD  Plan      Nebs               steroids                   ICS/LABA              LAMA   Daliresp     Pulmonary Nodule  6mm  Near current PNA  Plan f/u CT in 3 mo     CAD  + CP  Mild + trop but no ACS  Plan  ACE-I   Statin   BB

## 2017-01-19 NOTE — PLAN OF CARE
METABOLIC/FLUID AND ELECTROLYTES - ADULT    • Glucose maintained within prescribed range Not Progressing    ACCUCHECK WITH SS INSULIN & LEVEMIR    RESPIRATORY - ADULT    • Achieves optimal ventilation and oxygenation Not Progressing    VAPOTHERM 65%~30L DA

## 2017-01-19 NOTE — PROGRESS NOTES
[facility]    Progress Note    [facility] Patient Status:  Inpatient    1940 MRN [id number]   Location [facility]5W Attending Belkis MD Sharan   [facility] Day # 15 PCP Arelis Contreras MD     Subjective: (cpt=71010)    1/18/2017  CONCLUSION:  1. Borderline cardiomegaly. Tortuous aorta. CABG. 2. Prominent bibasilar markings. Developing left basilar consolidation/alveolitis.  3. Recommend followup to resolution                 Shobha Yadav MD, Kayleigh Hylton MD  1/19/2

## 2017-01-19 NOTE — RESPIRATORY THERAPY NOTE
Pt weaned from max venturi (FiO2 .6 @ 30 lpm) and placed on HFC @ 15 lpm; tolerating well. O2 Sats 98%.

## 2017-01-20 LAB
GLUCOSE BLDC GLUCOMTR-MCNC: 227 MG/DL (ref 70–99)
GLUCOSE BLDC GLUCOMTR-MCNC: 232 MG/DL (ref 70–99)
GLUCOSE BLDC GLUCOMTR-MCNC: 331 MG/DL (ref 70–99)
GLUCOSE BLDC GLUCOMTR-MCNC: 341 MG/DL (ref 70–99)
GLUCOSE BLDC GLUCOMTR-MCNC: 375 MG/DL (ref 70–99)
GLUCOSE BLDC GLUCOMTR-MCNC: 382 MG/DL (ref 70–99)
VANCOMYCIN TROUGH SERPL-MCNC: 10.9 MCG/ML (ref 10–20)

## 2017-01-20 PROCEDURE — 99233 SBSQ HOSP IP/OBS HIGH 50: CPT | Performed by: INTERNAL MEDICINE

## 2017-01-20 RX ORDER — SODIUM CHLORIDE 0.9 % (FLUSH) 0.9 %
10 SYRINGE (ML) INJECTION AS NEEDED
Status: DISCONTINUED | OUTPATIENT
Start: 2017-01-20 | End: 2017-01-24

## 2017-01-20 NOTE — PROGRESS NOTES
Pulmonary/Critical Care Follow Up Note    HPI:   Hiram Barba is a 68year old male with Patient presents with:  Dyspnea HECTOR SOB (respiratory)      PCP Arelis Contreras MD  Admission Attending Sherrie Alvarez 15 Day #16    Better aspart (NOVOLOG) 100 UNIT/ML flexpen 1-11 Units, 1-11 Units, Subcutaneous, TID CC  •  guaiFENesin (ROBITUSSIN) 100 MG/5ML solution 100-200 mg, 100-200 mg, Oral, Q6H PRN  •  Fluticasone Furoate-Vilanterol (BREO ELLIPTA) 100-25 MCG/INH inhaler 1 puff, 1 puff now  Plan cont rate as needed   Apixiban   Metop   Dilt    Renal  CLAUDETTE last admission  stable  Plan   Follow     DVT px  Plan NOAC      Possible rehab as soon as tomorrow          Gonzalez Fernandez MD

## 2017-01-20 NOTE — PHYSICAL THERAPY NOTE
PHYSICAL THERAPY TREATMENT NOTE - INPATIENT    Room Number: 503/503-A       Presenting Problem: Shortness of breath    Problem List  Principal Problem:    Community acquired pneumonia  Active Problems:    Hyperglycemia    Anemia    Azotemia    COPD exacer Static Sitting: Good  Dynamic Sitting: Good           Static Standing: Good  Dynamic Standing: Fair +    ACTIVITY TOLERANCE  O2 Saturation with activity 93%  O2 Device: Nasal cannula  Liters of O2:  15  No shortness of br feet with rolling walker  4) NT    Isaak Peña, PT, DPT   Inpatient Physical Therapist  [facility]  [id number]

## 2017-01-20 NOTE — OCCUPATIONAL THERAPY NOTE
OCCUPATIONAL THERAPY TREATMENT NOTE - INPATIENT     Room Number: 239/346-Q  Number of Visits to Meet Established Goals: 6    Presenting Problem: Pneumonia    Problem List  Principal Problem:    Community acquired pneumonia  Active Problems:    Hyperglycemi present    ASSESSMENT   RN contacted prior to start of are. Treatment coordinated w/ PT. Pt received up in chair, alert and oriented. Wife present at bedside. Focus ofd treatment on functional mobility, oxygen management and adls.  Pt currently performing s

## 2017-01-20 NOTE — PROGRESS NOTES
[facility]    Progress Note    Anna Madrigal Patient Status:  Inpatient    1940 MRN [id number]   Location [facility]5W Attending Michael Boyer MD   [facility] Day # 12 PCP Arelis Contreras MD     Subjective:

## 2017-01-21 LAB
GLUCOSE BLDC GLUCOMTR-MCNC: 163 MG/DL (ref 70–99)
GLUCOSE BLDC GLUCOMTR-MCNC: 186 MG/DL (ref 70–99)
GLUCOSE BLDC GLUCOMTR-MCNC: 194 MG/DL (ref 70–99)
GLUCOSE BLDC GLUCOMTR-MCNC: 273 MG/DL (ref 70–99)

## 2017-01-21 PROCEDURE — 99232 SBSQ HOSP IP/OBS MODERATE 35: CPT | Performed by: INTERNAL MEDICINE

## 2017-01-21 RX ORDER — ACETYLCYSTEINE 200 MG/ML
SOLUTION ORAL; RESPIRATORY (INHALATION)
Status: DISCONTINUED
Start: 2017-01-21 | End: 2017-01-21 | Stop reason: WASHOUT

## 2017-01-21 RX ORDER — ACETYLCYSTEINE 200 MG/ML
2 SOLUTION ORAL; RESPIRATORY (INHALATION) EVERY 6 HOURS PRN
Status: DISCONTINUED | OUTPATIENT
Start: 2017-01-21 | End: 2017-01-24

## 2017-01-21 NOTE — PAYOR COMM NOTE
Alicia Huff #[id number]  (67 year old M)       [facility] [id number]         Juan José Porter MD Physician Signed  Progress Notes 1/18/2017  1:40 PM      Expand All Collapse All    Pulmonary/Critical Care Follow Up Note      HPI:    Anna Rohan is a 68 y solution 3 mL, 3 mL, Nebulization, Q4H PRN  •  DiltiaZEM HCl ER Coated Beads (CARDIZEM CD) 24 hr cap 180 mg, 180 mg, Oral, Nightly  •  metoprolol Tartrate (LOPRESSOR) tab 25 mg, 25 mg, Oral, 2x Daily(Beta Blocker)  •  insulin aspart (NOVOLOG) 100 UNIT/ML f chronic  Recent admission for rigors and presumed PNA  Now with small PE, COPD, CHF  Also contributing CAD, Anemia, h/o decortication, deconditioning  CXR recently stable  Repeat CTA improved  Plan     IV steroids and slow wean              BiPAP QHS       Eyes: Pupils are equal, round, and reactive to light. Neck: Normal range of motion. Cardiovascular: Normal rate.    Pulmonary/Chest: Effort normal.   Abdominal: Soft. Neurological: He is alert. Skin: Skin is warm.    Psychiatric: He has a normal m Disorder of thyroid      •  Cataract            Medications:    Current facility-administered medications:    •  ipratropium-albuterol (DUONEB) nebulizer solution 3 mL, 3 mL, Nebulization, QID  •  apixaban (ELIQUIS) tab 5 mg, 5 mg, Oral, BID  •  MethylPRED (CARDIZEM CD) 24 hr cap 240 mg, 240 mg, Oral, Daily  •  Levothyroxine Sodium (SYNTHROID) tab 50 mcg, 50 mcg, Oral, Before breakfast      Allergies:  No Known Allergies          PHYSICAL EXAM:    Blood pressure 132/43, pulse 65, temperature 97.6 °F (36.4 °C Note      HPI:    Graciela Keane is a 68year old male with Patient presents with:  Dyspnea HECTOR SOB (respiratory)      PCP Arelis Contreras MD  Admission Attending Sherrie Lopez 15 Day #16    Better  Less sob  Tolerating bipap at insulin aspart (NOVOLOG) 100 UNIT/ML flexpen 1-11 Units, 1-11 Units, Subcutaneous, TID CC  •  guaiFENesin (ROBITUSSIN) 100 MG/5ML solution 100-200 mg, 100-200 mg, Oral, Q6H PRN  •  Fluticasone Furoate-Vilanterol (BREO ELLIPTA) 100-25 MCG/INH inhaler 1 puff mo      CAD  + CP  Mild + trop but no ACS  Plan  ACE-I              Statin

## 2017-01-21 NOTE — PROGRESS NOTES
[facility]    Progress Note    Eloisa Churchill Patient Status:  Inpatient    1940 MRN [id number]   Location [facility]5W Attending Sana Penaloza MD   [address] Day # 16 PCP Arelis Contreras MD     Subjective:

## 2017-01-21 NOTE — DISCHARGE PLANNING
SW informed that pt's son is inquiring about rehab at [facility]. SW initiated referral to [facility], awaiting review and insurance benefits. [facility] may request a PMR consult.  As pt cannot transfer until at least Monday, PMR wa

## 2017-01-22 LAB
GLUCOSE BLDC GLUCOMTR-MCNC: 120 MG/DL (ref 70–99)
GLUCOSE BLDC GLUCOMTR-MCNC: 75 MG/DL (ref 70–99)
GLUCOSE BLDC GLUCOMTR-MCNC: 89 MG/DL (ref 70–99)
GLUCOSE BLDC GLUCOMTR-MCNC: 95 MG/DL (ref 70–99)

## 2017-01-22 PROCEDURE — 99232 SBSQ HOSP IP/OBS MODERATE 35: CPT | Performed by: INTERNAL MEDICINE

## 2017-01-22 NOTE — PROGRESS NOTES
[facility]    Progress Note    Ivette Parker Patient Status:  Inpatient    1940 MRN [id number]   Location [facility]5W Attending Hayes Valdez MD   [address] Day # 16 PCP Arelis Contreras MD     Subjective:    Robin

## 2017-01-22 NOTE — PROGRESS NOTES
[facility]    Progress Note    Leah Bile Patient Status:  Inpatient    1940 MRN [id number]   Location [facility]5W Attending Josee Jean MD   [facility] Day # 25 PCP Arelis Contreras MD     Subjective:    Robin

## 2017-01-22 NOTE — DISCHARGE PLANNING
1/22CM-The PMR consult was entered with Dr. Bridget Juarez on consult. Dr. Bridget Juarez is no longer on staff here. This Writer informed the Patient's RN of the above and requested her to put the PMR consult with Dr. Andressa Castro.  Case Management to follow up after pmr c

## 2017-01-22 NOTE — PROGRESS NOTES
[facility]    Progress Note    Willaashley Chenponce Patient Status:  Inpatient    1940 MRN [id number]   Location [facility]5W Attending Oren Baumann MD   [facility] Day # 25 PCP Arelis Contreras MD     Subjective:

## 2017-01-23 LAB
GLUCOSE BLDC GLUCOMTR-MCNC: 102 MG/DL (ref 70–99)
GLUCOSE BLDC GLUCOMTR-MCNC: 210 MG/DL (ref 70–99)
GLUCOSE BLDC GLUCOMTR-MCNC: 217 MG/DL (ref 70–99)
GLUCOSE BLDC GLUCOMTR-MCNC: 71 MG/DL (ref 70–99)

## 2017-01-23 PROCEDURE — 99232 SBSQ HOSP IP/OBS MODERATE 35: CPT | Performed by: INTERNAL MEDICINE

## 2017-01-23 NOTE — PROGRESS NOTES
[facility]    Progress Note    Dion Artis Patient Status:  Inpatient    1940 MRN [id number]   Location [facility]5W Attending Brenda Her MD   [facility] Day # 23 PCP Arelis Contreras MD     Subjective:

## 2017-01-23 NOTE — PHYSICAL THERAPY NOTE
PHYSICAL THERAPY TREATMENT NOTE - INPATIENT    Room Number: 503/503-A       Presenting Problem: Shortness of breath    Problem List  Principal Problem:    Community acquired pneumonia  Active Problems:    Hyperglycemia    Anemia    Azotemia    COPD exacer conservation;Patient education; Family education;Gait training;Range of motion;Strengthening;Transfer training;Balance training    SUBJECTIVE  Patient reports he wants to walk further    WEIGHT BEARING RESTRICTION  Weight Bearing Restriction: None education    Patient End of Session: Up in chair;Needs met;Call light within reach;RN aware of session/findings; All patient questions and concerns addressed    CURRENT GOALS , All goals in progress on 1/23/2017  Goal #1 Patient is able to demonstrate supin

## 2017-01-23 NOTE — PLAN OF CARE
Problem: RESPIRATORY - ADULT  Goal: Achieves optimal ventilation and oxygenation  INTERVENTIONS:  - Assess for changes in respiratory status  - Assess for changes in mentation and behavior  - Position to facilitate oxygenation and minimize respiratory effo response to interventions for patient’s volume status, including labs, urine output, blood pressure (other measures as available)  - Encourage oral intake as appropriate  - Instruct patient on fluid and nutrition restrictions as appropriate   Outcome: Prog

## 2017-01-23 NOTE — PROGRESS NOTES
Pulmonary/Critical Care Follow Up Note    HPI:   Tasneem Ford is a 68year old male with Patient presents with:  Dyspnea HECTOR SOB (respiratory)      PCP Arelis Contreras MD  Admission Attending Sherrie Trujillo 15 Day #19    Better puff, 1 puff, Inhalation, Daily  •  Umeclidinium Bromide (INCRUSE ELLIPTA) 62.5 MCG/INH inhaler 1 puff, 1 puff, Inhalation, Daily  •  dextrose injection 50 mL, 50 mL, Intravenous, PRN  •  Albuterol Sulfate  (90 BASE) MCG/ACT inhaler 2 puff, 2 puff,

## 2017-01-23 NOTE — CONSULTS
PHYSICAL MEDICINE AND REHABILITATION CONSULTATION     CC: Impaired mobility and ADL dysfunction secondary to COPD, Community acquired pna, Small PE    HPI: This is a 69 y/o male with a PMH of DM, HTN, COPD, CAD, HL who presented to [facility] due to Fluticasone Furoate-Vilanterol (BREO ELLIPTA) 100-25 MCG/INH inhaler 1 puff, 1 puff, Inhalation, Daily  •  Umeclidinium Bromide (INCRUSE ELLIPTA) 62.5 MCG/INH inhaler 1 puff, 1 puff, Inhalation, Daily  •  dextrose injection 50 mL, 50 mL, Intravenous, PRN good Build   Head: Normocephalic, atraumatic, without obvious abnormality  Eyes: conjunctiva/lids without erythema/icterus, EOMI  E/N/T: No scars noted on bilateral ears, Lips normal, MMM  Neck: supple, symmetrical, no thyromegaly appreciated  Lungs: Non l

## 2017-01-24 VITALS
DIASTOLIC BLOOD PRESSURE: 41 MMHG | WEIGHT: 155.69 LBS | SYSTOLIC BLOOD PRESSURE: 137 MMHG | HEIGHT: 65 IN | RESPIRATION RATE: 22 BRPM | OXYGEN SATURATION: 95 % | HEART RATE: 83 BPM | BODY MASS INDEX: 25.94 KG/M2 | TEMPERATURE: 98 F

## 2017-01-24 LAB
GLUCOSE BLDC GLUCOMTR-MCNC: 133 MG/DL (ref 70–99)
GLUCOSE BLDC GLUCOMTR-MCNC: 183 MG/DL (ref 70–99)

## 2017-01-24 PROCEDURE — 99232 SBSQ HOSP IP/OBS MODERATE 35: CPT | Performed by: INTERNAL MEDICINE

## 2017-01-24 RX ORDER — PREDNISONE 20 MG/1
20 TABLET ORAL
Status: DISCONTINUED | OUTPATIENT
Start: 2017-01-25 | End: 2017-01-24

## 2017-01-24 RX ORDER — PREDNISONE 20 MG/1
20 TABLET ORAL
Qty: 10 TABLET | Refills: 0 | Status: ON HOLD | OUTPATIENT
Start: 2017-01-25 | End: 2019-01-01

## 2017-01-24 NOTE — PROGRESS NOTES
[facility]     Progress Note        Shonna Garza Patient Status:  Inpatient    1940 MRN [id number]   Location [facility] 1SW Attending Miki Horowitz MD   [facility] Day # 21 PCP MD Jenn Archibald 1 puff 1 puff Inhalation Daily   Umeclidinium Bromide (INCRUSE ELLIPTA) 62.5 MCG/INH inhaler 1 puff 1 puff Inhalation Daily   dextrose injection 50 mL 50 mL Intravenous PRN   Albuterol Sulfate  (90 BASE) MCG/ACT inhaler 2 puff 2 puff Inhalation Q4H Postop changes. 5. Atherosclerosis. 6. A preliminary report was submitted and there are no major discrepancies. Xr Chest Ap Portable  (cpt=71010)    12/29/2016  PROCEDURE: XR CHEST AP PORTABLE (CPT=71010) TIME: 0608 hrs. .   COMPARISON: [facility] Negron POSITION: Upright CARDIAC: Normal size. Surgical clips. MEDIASTINUM/ASHER:   Slight aortic elongation with calcification in the knob. No visible mass or adenopathy. VASCULARITY: Normal LUNGS/PLEURA: The lungs are hyperinflated.  Surgical clips project over t Ulises Barrera MD on 12/27/2016 at 19:47     Approved by (CST): Ulises Barrera MD on 12/27/2016 at 19:48          12/27/2016  CONCLUSION:  1. Patchy opacities in the bilateral lung bases there is atelectasis or consolidation 2. Small bilateral pleural effusions. or axillary adenopathy. LIMITED ABDOMEN: Normal adrenal glands. No suspect abnormality. BONES: Moderate chronic T5 vertebral body compression fracture. OTHER: Negative.    Dictated by (CST): Nilson Stein MD on 1/04/2017 at 10:51     Approved by (CST): S not significantly improved throughout hospitalization.   Likely multifactorial in nature with underlying pneumonia, COPD, small pulmonary embolism seen on CT chest, CHF, underlying deconditioning.  -Oxygenation requirements gradually improved  -Eliquis for

## 2017-01-24 NOTE — PHYSICAL THERAPY NOTE
Attempted physical therapy treatment. Patient declined stating he had just ambulated with his wife and will be going home this PM. Reviewed home safety and energy conservation techniques; patient with good carryover and wife to assist at home as needed.  RN

## 2017-01-24 NOTE — DISCHARGE SUMMARY
[facility]    Discharge Summary    Anna Madrigal Patient Status:  Inpatient    1940 MRN [id number]   Location [facility]5W Attending Michael Boyer MD   Hosp Day # 21 PCP Arelis Contreras MD     Date of Admi Instructions Prescription details    predniSONE 20 MG Tabs   Last time this was given:  1/13/2017  9:09 AM   Commonly known as:  Kassidy Singh   Start taking on:  1/25/2017   What changed:    - medication strength  - how much to take  - when to take this drug:  Glucose Blood         Refills:  0       Roflumilast 500 MCG Tabs   Last time this was given:  500 mcg on 1/24/2017  7:55 AM        Take 500 mcg by mouth daily.     Refills:  0       SYMBICORT 160-4.5 MCG/ACT Aero   Generic drug:  Budesonide-Formotero

## 2017-01-24 NOTE — DISCHARGE PLANNING
PMR recommending home w/ outpt PT at this time. SW met w/ pt to discuss recommendation. Pt stated he is agreeable w/ returning home and going to outpt PT. Pt stated he feels safe to return home. Pt reported having Home O2.  Pt requested walker - SW infor

## 2017-01-25 ENCOUNTER — TELEPHONE (OUTPATIENT)
Dept: MEDSURG UNIT | Facility: HOSPITAL | Age: 77
End: 2017-01-25

## 2017-01-25 NOTE — PAYOR COMM NOTE
Kasandra Ram #[id number]  (67 year old M)       [facility] [id number]         Amando Chu MD Physician Signed  Discharge Summaries 1/24/2017  4:32 PM      [facility]    Discharge Summary      Marcelo Alba fever    Hospital Course: Pt admitted for SOB and found to have pneumonia.    Consultations: Pulmonary  Discharge Condition: Stable    Discharge Medications:       Discharge Medications        CHANGE how you take these medications          Instructions  Pre lisinopril 5 MG Tabs    Last time this was given:  5 mg on 1/17/2017  8:27 AM    Commonly known as:  PRINIVIL,ZESTRIL           Take 5 mg by mouth daily.      Refills:  0          MetFORMIN HCl 500 MG Tabs    Commonly known as:  GLUCOPHAGE           500 mg

## 2017-01-26 ENCOUNTER — TELEPHONE (OUTPATIENT)
Dept: MEDSURG UNIT | Facility: HOSPITAL | Age: 77
End: 2017-01-26

## 2017-01-31 ENCOUNTER — OFFICE VISIT (OUTPATIENT)
Dept: CARDIOLOGY CLINIC | Facility: HOSPITAL | Age: 77
End: 2017-01-31
Attending: INTERNAL MEDICINE
Payer: MEDICARE

## 2017-01-31 VITALS
DIASTOLIC BLOOD PRESSURE: 45 MMHG | OXYGEN SATURATION: 93 % | RESPIRATION RATE: 17 BRPM | SYSTOLIC BLOOD PRESSURE: 106 MMHG | BODY MASS INDEX: 23 KG/M2 | WEIGHT: 140 LBS | HEART RATE: 88 BPM

## 2017-01-31 DIAGNOSIS — I47.1 PSVT (PAROXYSMAL SUPRAVENTRICULAR TACHYCARDIA) (HCC): ICD-10-CM

## 2017-01-31 DIAGNOSIS — I26.99 PE (PULMONARY THROMBOEMBOLISM) (HCC): ICD-10-CM

## 2017-01-31 DIAGNOSIS — I47.2 NSVT (NONSUSTAINED VENTRICULAR TACHYCARDIA) (HCC): ICD-10-CM

## 2017-01-31 DIAGNOSIS — I50.33 ACUTE ON CHRONIC DIASTOLIC HF (HEART FAILURE) (HCC): Chronic | ICD-10-CM

## 2017-01-31 DIAGNOSIS — J44.9 CHRONIC OBSTRUCTIVE PULMONARY DISEASE, UNSPECIFIED COPD TYPE (HCC): ICD-10-CM

## 2017-01-31 DIAGNOSIS — J18.9 COMMUNITY ACQUIRED PNEUMONIA: ICD-10-CM

## 2017-01-31 DIAGNOSIS — J18.9 SEPSIS DUE TO PNEUMONIA (HCC): ICD-10-CM

## 2017-01-31 DIAGNOSIS — D64.9 ANEMIA, UNSPECIFIED TYPE: ICD-10-CM

## 2017-01-31 DIAGNOSIS — J12.9 VIRAL PNEUMONIA, UNSPECIFIED: Primary | ICD-10-CM

## 2017-01-31 DIAGNOSIS — A41.9 SEPSIS DUE TO PNEUMONIA (HCC): ICD-10-CM

## 2017-01-31 PROBLEM — I47.29 NSVT (NONSUSTAINED VENTRICULAR TACHYCARDIA) (HCC): Status: ACTIVE | Noted: 2017-01-31

## 2017-01-31 PROBLEM — I47.10 PSVT (PAROXYSMAL SUPRAVENTRICULAR TACHYCARDIA): Status: ACTIVE | Noted: 2017-01-31

## 2017-01-31 LAB
ANION GAP SERPL CALC-SCNC: 9 MMOL/L (ref 0–18)
BASOPHILS # BLD: 0 K/UL (ref 0–0.2)
BASOPHILS NFR BLD: 0 %
BUN SERPL-MCNC: 31 MG/DL (ref 8–20)
BUN/CREAT SERPL: 27.4 (ref 10–20)
CALCIUM SERPL-MCNC: 8.5 MG/DL (ref 8.5–10.5)
CHLORIDE SERPL-SCNC: 98 MMOL/L (ref 95–110)
CO2 SERPL-SCNC: 32 MMOL/L (ref 22–32)
CREAT SERPL-MCNC: 1.13 MG/DL (ref 0.5–1.5)
EOSINOPHIL # BLD: 0 K/UL (ref 0–0.7)
EOSINOPHIL NFR BLD: 0 %
ERYTHROCYTE [DISTWIDTH] IN BLOOD BY AUTOMATED COUNT: 15.9 % (ref 11–15)
GLUCOSE SERPL-MCNC: 209 MG/DL (ref 70–99)
HCT VFR BLD AUTO: 23.2 % (ref 41–52)
HGB BLD-MCNC: 7.4 G/DL (ref 13.5–17.5)
LYMPHOCYTES # BLD: 0.5 K/UL (ref 1–4)
LYMPHOCYTES NFR BLD: 5 %
MCH RBC QN AUTO: 26.1 PG (ref 27–32)
MCHC RBC AUTO-ENTMCNC: 31.6 G/DL (ref 32–37)
MCV RBC AUTO: 82.5 FL (ref 80–100)
MONOCYTES # BLD: 0.3 K/UL (ref 0–1)
MONOCYTES NFR BLD: 3 %
NEUTROPHILS # BLD AUTO: 9.2 K/UL (ref 1.8–7.7)
NEUTROPHILS NFR BLD: 92 %
OSMOLALITY UR CALC.SUM OF ELEC: 301 MOSM/KG (ref 275–295)
PLATELET # BLD AUTO: 113 K/UL (ref 140–400)
PMV BLD AUTO: 9.6 FL (ref 7.4–10.3)
POTASSIUM SERPL-SCNC: 3.7 MMOL/L (ref 3.3–5.1)
RBC # BLD AUTO: 2.82 M/UL (ref 4.5–5.9)
SODIUM SERPL-SCNC: 139 MMOL/L (ref 136–144)
WBC # BLD AUTO: 10 K/UL (ref 4–11)

## 2017-01-31 PROCEDURE — 85025 COMPLETE CBC W/AUTO DIFF WBC: CPT | Performed by: NURSE PRACTITIONER

## 2017-01-31 PROCEDURE — 99214 OFFICE O/P EST MOD 30 MIN: CPT | Performed by: NURSE PRACTITIONER

## 2017-01-31 PROCEDURE — 99211 OFF/OP EST MAY X REQ PHY/QHP: CPT | Performed by: NURSE PRACTITIONER

## 2017-01-31 PROCEDURE — 80048 BASIC METABOLIC PNL TOTAL CA: CPT | Performed by: NURSE PRACTITIONER

## 2017-01-31 PROCEDURE — 36415 COLL VENOUS BLD VENIPUNCTURE: CPT | Performed by: NURSE PRACTITIONER

## 2017-01-31 RX ORDER — BUMETANIDE 1 MG/1
1 TABLET ORAL DAILY
Qty: 30 TABLET | Refills: 0 | Status: ON HOLD | COMMUNITY
Start: 2017-01-31 | End: 2019-01-01

## 2017-01-31 NOTE — PROGRESS NOTES
Malachi 161 Patient Status:  Outpatient    1940 MRN [id number]   Location [address] [facility] MD Isamar Contreras MD [place] Feast, MD Josetta Brighter is a 68 ye 140, down 2-3 lb at home. Sees Dr. Zion Bradley and Dr. Petty Means -tina at [facility], no appt made yet.      Review of Systems:  Constitutional:  negative for fatigue  Respiratory: positive for cough, dyspnea on exertion , negative for cough, hemoptysis and wheezin rhythm  Abdomen: soft, non-tender; bowel sounds normal; no masses,  no organomegaly  Extremities: extremities normal, atraumatic, no cyanosis, +1 charo mid tibial to ankle edema, above sock  Pulses: 2+ and symmetric  Neurologic: Grossly normal    Cardiograph Take bumex 1 mg daily prn for increased swelling, wt gain or worsening sob. Follow up with PCP for anemia. Continue eliquis 2.5 mg bid. Wear compression stockings daily.  Begin out pt PT   Follow up with PCP in 1 week, Dr. Pepe Soler in 1-2 weeks and cardiolo develop chest pain, lightheadedness, or significant shortness of breath          I spent greater than 40 minutes with this patient providing counseling, coordination of care and education related specifically to heart failure.       Giovanny Lofton NP  1/31/2

## 2017-01-31 NOTE — PATIENT INSTRUCTIONS
Continue all your same medications    May take bumex 1 mg tablet , one tablet as needed for increased leg edema or if gaining more than 3 lbs overnight or gaining more than 5 lbs in one week and call your cardiologist    Call if having any dizziness, light

## 2017-07-20 ENCOUNTER — CARDPULM VISIT (OUTPATIENT)
Dept: CARDIAC REHAB | Facility: HOSPITAL | Age: 77
End: 2017-07-20
Attending: INTERNAL MEDICINE
Payer: MEDICARE

## 2017-07-20 DIAGNOSIS — J44.9 COPD (CHRONIC OBSTRUCTIVE PULMONARY DISEASE) (HCC): ICD-10-CM

## 2017-07-20 DIAGNOSIS — J43.9 EMPHYSEMA LUNG (HCC): ICD-10-CM

## 2017-07-20 DIAGNOSIS — J42 CHRONIC BRONCHITIS (HCC): ICD-10-CM

## 2017-07-25 ENCOUNTER — CARDPULM VISIT (OUTPATIENT)
Dept: CARDIAC REHAB | Facility: HOSPITAL | Age: 77
End: 2017-07-25
Attending: INTERNAL MEDICINE
Payer: MEDICARE

## 2017-07-27 ENCOUNTER — CARDPULM VISIT (OUTPATIENT)
Dept: CARDIAC REHAB | Facility: HOSPITAL | Age: 77
End: 2017-07-27
Attending: INTERNAL MEDICINE
Payer: MEDICARE

## 2017-08-01 ENCOUNTER — CARDPULM VISIT (OUTPATIENT)
Dept: CARDIAC REHAB | Facility: HOSPITAL | Age: 77
End: 2017-08-01
Attending: INTERNAL MEDICINE
Payer: MEDICARE

## 2017-08-03 ENCOUNTER — CARDPULM VISIT (OUTPATIENT)
Dept: CARDIAC REHAB | Facility: HOSPITAL | Age: 77
End: 2017-08-03
Attending: INTERNAL MEDICINE
Payer: MEDICARE

## 2017-08-08 ENCOUNTER — CARDPULM VISIT (OUTPATIENT)
Dept: CARDIAC REHAB | Facility: HOSPITAL | Age: 77
End: 2017-08-08
Attending: INTERNAL MEDICINE
Payer: MEDICARE

## 2017-08-10 ENCOUNTER — CARDPULM VISIT (OUTPATIENT)
Dept: CARDIAC REHAB | Facility: HOSPITAL | Age: 77
End: 2017-08-10
Attending: INTERNAL MEDICINE
Payer: MEDICARE

## 2017-08-15 ENCOUNTER — APPOINTMENT (OUTPATIENT)
Dept: CARDIAC REHAB | Facility: HOSPITAL | Age: 77
End: 2017-08-15
Attending: INTERNAL MEDICINE
Payer: MEDICARE

## 2017-08-17 ENCOUNTER — CARDPULM VISIT (OUTPATIENT)
Dept: CARDIAC REHAB | Facility: HOSPITAL | Age: 77
End: 2017-08-17
Attending: INTERNAL MEDICINE
Payer: MEDICARE

## 2017-08-22 ENCOUNTER — CARDPULM VISIT (OUTPATIENT)
Dept: CARDIAC REHAB | Facility: HOSPITAL | Age: 77
End: 2017-08-22
Attending: INTERNAL MEDICINE
Payer: MEDICARE

## 2017-08-29 ENCOUNTER — CARDPULM VISIT (OUTPATIENT)
Dept: CARDIAC REHAB | Facility: HOSPITAL | Age: 77
End: 2017-08-29
Attending: INTERNAL MEDICINE
Payer: MEDICARE

## 2017-08-31 ENCOUNTER — CARDPULM VISIT (OUTPATIENT)
Dept: CARDIAC REHAB | Facility: HOSPITAL | Age: 77
End: 2017-08-31
Attending: INTERNAL MEDICINE
Payer: MEDICARE

## 2017-09-05 ENCOUNTER — CARDPULM VISIT (OUTPATIENT)
Dept: CARDIAC REHAB | Facility: HOSPITAL | Age: 77
End: 2017-09-05
Attending: INTERNAL MEDICINE
Payer: MEDICARE

## 2017-09-07 ENCOUNTER — CARDPULM VISIT (OUTPATIENT)
Dept: CARDIAC REHAB | Facility: HOSPITAL | Age: 77
End: 2017-09-07
Attending: INTERNAL MEDICINE
Payer: MEDICARE

## 2017-11-02 ENCOUNTER — CARDPULM VISIT (OUTPATIENT)
Dept: CARDIAC REHAB | Facility: HOSPITAL | Age: 77
End: 2017-11-02
Attending: INTERNAL MEDICINE
Payer: MEDICARE

## 2017-11-09 PROCEDURE — 99305 1ST NF CARE MODERATE MDM 35: CPT | Performed by: INTERNAL MEDICINE

## 2017-11-12 ENCOUNTER — SNF/IP PROF CHARGE ONLY (OUTPATIENT)
Dept: INTERNAL MEDICINE CLINIC | Facility: CLINIC | Age: 77
End: 2017-11-12

## 2017-11-12 DIAGNOSIS — J44.1 COPD EXACERBATION (HCC): ICD-10-CM

## 2017-11-12 DIAGNOSIS — I50.33 ACUTE ON CHRONIC DIASTOLIC HF (HEART FAILURE) (HCC): Chronic | ICD-10-CM

## 2017-11-12 DIAGNOSIS — I26.99 PE (PULMONARY THROMBOEMBOLISM) (HCC): ICD-10-CM

## 2017-11-12 DIAGNOSIS — J44.9 CHRONIC OBSTRUCTIVE PULMONARY DISEASE, UNSPECIFIED COPD TYPE (HCC): ICD-10-CM

## 2018-01-01 ENCOUNTER — TELEPHONE (OUTPATIENT)
Dept: CARDIOLOGY UNIT | Facility: HOSPITAL | Age: 78
End: 2018-01-01

## 2018-01-01 ENCOUNTER — HOSPITAL ENCOUNTER (INPATIENT)
Facility: HOSPITAL | Age: 78
LOS: 5 days | Discharge: HOME | DRG: 190 | End: 2018-01-01
Attending: EMERGENCY MEDICINE | Admitting: HOSPITALIST
Payer: MEDICARE

## 2018-01-01 ENCOUNTER — TELEPHONE (OUTPATIENT)
Dept: MEDSURG UNIT | Facility: HOSPITAL | Age: 78
End: 2018-01-01

## 2018-01-01 ENCOUNTER — APPOINTMENT (OUTPATIENT)
Dept: GENERAL RADIOLOGY | Facility: HOSPITAL | Age: 78
DRG: 190 | End: 2018-01-01
Attending: EMERGENCY MEDICINE
Payer: MEDICARE

## 2018-01-01 ENCOUNTER — TELEPHONE (OUTPATIENT)
Dept: PULMONOLOGY | Facility: CLINIC | Age: 78
End: 2018-01-01

## 2018-01-01 VITALS
SYSTOLIC BLOOD PRESSURE: 131 MMHG | OXYGEN SATURATION: 97 % | DIASTOLIC BLOOD PRESSURE: 57 MMHG | HEIGHT: 65 IN | TEMPERATURE: 98 F | WEIGHT: 159.38 LBS | HEART RATE: 78 BPM | RESPIRATION RATE: 22 BRPM | BODY MASS INDEX: 26.55 KG/M2

## 2018-01-01 DIAGNOSIS — I50.9 ACUTE ON CHRONIC CONGESTIVE HEART FAILURE, UNSPECIFIED HEART FAILURE TYPE (HCC): ICD-10-CM

## 2018-01-01 DIAGNOSIS — J44.1 ACUTE EXACERBATION OF CHRONIC OBSTRUCTIVE PULMONARY DISEASE (COPD) (HCC): Primary | ICD-10-CM

## 2018-01-01 LAB
ADENOVIRUS PCR:: NEGATIVE
ANION GAP SERPL CALC-SCNC: 11 MMOL/L (ref 0–18)
ANION GAP SERPL CALC-SCNC: 13 MMOL/L (ref 0–18)
ANION GAP SERPL CALC-SCNC: 8 MMOL/L (ref 0–18)
ANION GAP SERPL CALC-SCNC: 9 MMOL/L (ref 0–18)
ANION GAP SERPL CALC-SCNC: 9 MMOL/L (ref 0–18)
B PERT DNA SPEC QL NAA+PROBE: NEGATIVE
BASOPHILS # BLD: 0 K/UL (ref 0–0.2)
BASOPHILS NFR BLD: 0 %
BNP SERPL-MCNC: 641 PG/ML (ref 0–100)
BNP SERPL-MCNC: 772 PG/ML (ref 0–100)
BUN SERPL-MCNC: 42 MG/DL (ref 8–20)
BUN SERPL-MCNC: 46 MG/DL (ref 8–20)
BUN SERPL-MCNC: 60 MG/DL (ref 8–20)
BUN SERPL-MCNC: 62 MG/DL (ref 8–20)
BUN SERPL-MCNC: 64 MG/DL (ref 8–20)
BUN SERPL-MCNC: 69 MG/DL (ref 8–20)
BUN SERPL-MCNC: 74 MG/DL (ref 8–20)
BUN/CREAT SERPL: 31.3 (ref 10–20)
BUN/CREAT SERPL: 31.6 (ref 10–20)
BUN/CREAT SERPL: 32.4 (ref 10–20)
BUN/CREAT SERPL: 35 (ref 10–20)
BUN/CREAT SERPL: 46 (ref 10–20)
BUN/CREAT SERPL: 48.9 (ref 10–20)
BUN/CREAT SERPL: 52.3 (ref 10–20)
C PNEUM DNA SPEC QL NAA+PROBE: NEGATIVE
CALCIUM SERPL-MCNC: 7.9 MG/DL (ref 8.5–10.5)
CALCIUM SERPL-MCNC: 8.2 MG/DL (ref 8.5–10.5)
CALCIUM SERPL-MCNC: 8.2 MG/DL (ref 8.5–10.5)
CALCIUM SERPL-MCNC: 8.3 MG/DL (ref 8.5–10.5)
CALCIUM SERPL-MCNC: 8.4 MG/DL (ref 8.5–10.5)
CALCIUM SERPL-MCNC: 8.4 MG/DL (ref 8.5–10.5)
CALCIUM SERPL-MCNC: 8.6 MG/DL (ref 8.5–10.5)
CHLORIDE SERPL-SCNC: 100 MMOL/L (ref 95–110)
CHLORIDE SERPL-SCNC: 101 MMOL/L (ref 95–110)
CHLORIDE SERPL-SCNC: 93 MMOL/L (ref 95–110)
CHLORIDE SERPL-SCNC: 98 MMOL/L (ref 95–110)
CHLORIDE SERPL-SCNC: 99 MMOL/L (ref 95–110)
CHOLEST SERPL-MCNC: 164 MG/DL (ref 110–200)
CO2 SERPL-SCNC: 29 MMOL/L (ref 22–32)
CO2 SERPL-SCNC: 31 MMOL/L (ref 22–32)
CO2 SERPL-SCNC: 32 MMOL/L (ref 22–32)
CO2 SERPL-SCNC: 32 MMOL/L (ref 22–32)
CO2 SERPL-SCNC: 33 MMOL/L (ref 22–32)
CORONAVIRUS 229E PCR:: NEGATIVE
CORONAVIRUS HKU1 PCR:: NEGATIVE
CORONAVIRUS NL63 PCR:: NEGATIVE
CORONAVIRUS OC43 PCR:: NEGATIVE
CREAT SERPL-MCNC: 1.31 MG/DL (ref 0.5–1.5)
CREAT SERPL-MCNC: 1.32 MG/DL (ref 0.5–1.5)
CREAT SERPL-MCNC: 1.33 MG/DL (ref 0.5–1.5)
CREAT SERPL-MCNC: 1.47 MG/DL (ref 0.5–1.5)
CREAT SERPL-MCNC: 1.61 MG/DL (ref 0.5–1.5)
CREAT SERPL-MCNC: 1.77 MG/DL (ref 0.5–1.5)
CREAT SERPL-MCNC: 1.85 MG/DL (ref 0.5–1.5)
EOSINOPHIL # BLD: 0 K/UL (ref 0–0.7)
EOSINOPHIL NFR BLD: 0 %
ERYTHROCYTE [DISTWIDTH] IN BLOOD BY AUTOMATED COUNT: 14.4 % (ref 11–15)
ERYTHROCYTE [DISTWIDTH] IN BLOOD BY AUTOMATED COUNT: 14.5 % (ref 11–15)
ERYTHROCYTE [DISTWIDTH] IN BLOOD BY AUTOMATED COUNT: 14.5 % (ref 11–15)
ERYTHROCYTE [DISTWIDTH] IN BLOOD BY AUTOMATED COUNT: 14.6 % (ref 11–15)
ERYTHROCYTE [DISTWIDTH] IN BLOOD BY AUTOMATED COUNT: 14.6 % (ref 11–15)
ERYTHROCYTE [DISTWIDTH] IN BLOOD BY AUTOMATED COUNT: 14.7 % (ref 11–15)
EST. AVERAGE GLUCOSE BLD GHB EST-MCNC: 203 MG/DL (ref 68–126)
FLUAV RNA SPEC QL NAA+PROBE: NEGATIVE
FLUBV RNA SPEC QL NAA+PROBE: NEGATIVE
GLUCOSE BLDC GLUCOMTR-MCNC: 117 MG/DL (ref 70–99)
GLUCOSE BLDC GLUCOMTR-MCNC: 145 MG/DL (ref 70–99)
GLUCOSE BLDC GLUCOMTR-MCNC: 148 MG/DL (ref 70–99)
GLUCOSE BLDC GLUCOMTR-MCNC: 159 MG/DL (ref 70–99)
GLUCOSE BLDC GLUCOMTR-MCNC: 174 MG/DL (ref 70–99)
GLUCOSE BLDC GLUCOMTR-MCNC: 184 MG/DL (ref 70–99)
GLUCOSE BLDC GLUCOMTR-MCNC: 209 MG/DL (ref 70–99)
GLUCOSE BLDC GLUCOMTR-MCNC: 230 MG/DL (ref 70–99)
GLUCOSE BLDC GLUCOMTR-MCNC: 230 MG/DL (ref 70–99)
GLUCOSE BLDC GLUCOMTR-MCNC: 302 MG/DL (ref 70–99)
GLUCOSE BLDC GLUCOMTR-MCNC: 330 MG/DL (ref 70–99)
GLUCOSE BLDC GLUCOMTR-MCNC: 371 MG/DL (ref 70–99)
GLUCOSE BLDC GLUCOMTR-MCNC: 392 MG/DL (ref 70–99)
GLUCOSE BLDC GLUCOMTR-MCNC: 395 MG/DL (ref 70–99)
GLUCOSE BLDC GLUCOMTR-MCNC: 401 MG/DL (ref 70–99)
GLUCOSE BLDC GLUCOMTR-MCNC: 404 MG/DL (ref 70–99)
GLUCOSE BLDC GLUCOMTR-MCNC: 414 MG/DL (ref 70–99)
GLUCOSE BLDC GLUCOMTR-MCNC: 430 MG/DL (ref 70–99)
GLUCOSE BLDC GLUCOMTR-MCNC: 433 MG/DL (ref 70–99)
GLUCOSE BLDC GLUCOMTR-MCNC: 448 MG/DL (ref 70–99)
GLUCOSE BLDC GLUCOMTR-MCNC: 90 MG/DL (ref 70–99)
GLUCOSE BLDC GLUCOMTR-MCNC: 91 MG/DL (ref 70–99)
GLUCOSE BLDC GLUCOMTR-MCNC: 96 MG/DL (ref 70–99)
GLUCOSE BLDC GLUCOMTR-MCNC: >500 MG/DL (ref 70–99)
GLUCOSE BLDC GLUCOMTR-MCNC: >500 MG/DL (ref 70–99)
GLUCOSE SERPL-MCNC: 113 MG/DL (ref 70–99)
GLUCOSE SERPL-MCNC: 158 MG/DL (ref 70–99)
GLUCOSE SERPL-MCNC: 215 MG/DL (ref 70–99)
GLUCOSE SERPL-MCNC: 385 MG/DL (ref 70–99)
GLUCOSE SERPL-MCNC: 402 MG/DL (ref 70–99)
GLUCOSE SERPL-MCNC: 412 MG/DL (ref 70–99)
GLUCOSE SERPL-MCNC: 612 MG/DL (ref 70–99)
HBA1C MFR BLD HPLC: 8.7 % (ref ?–5.7)
HCT VFR BLD AUTO: 26.8 % (ref 41–52)
HCT VFR BLD AUTO: 27.1 % (ref 41–52)
HCT VFR BLD AUTO: 27.4 % (ref 41–52)
HCT VFR BLD AUTO: 28 % (ref 41–52)
HCT VFR BLD AUTO: 28.1 % (ref 41–52)
HCT VFR BLD AUTO: 30.4 % (ref 41–52)
HDLC SERPL-MCNC: 61 MG/DL
HGB BLD-MCNC: 8.7 G/DL (ref 13.5–17.5)
HGB BLD-MCNC: 8.7 G/DL (ref 13.5–17.5)
HGB BLD-MCNC: 8.9 G/DL (ref 13.5–17.5)
HGB BLD-MCNC: 9 G/DL (ref 13.5–17.5)
HGB BLD-MCNC: 9.1 G/DL (ref 13.5–17.5)
HGB BLD-MCNC: 9.6 G/DL (ref 13.5–17.5)
LDLC SERPL CALC-MCNC: 50 MG/DL (ref 0–99)
LYMPHOCYTES # BLD: 0.3 K/UL (ref 1–4)
LYMPHOCYTES # BLD: 0.3 K/UL (ref 1–4)
LYMPHOCYTES # BLD: 0.4 K/UL (ref 1–4)
LYMPHOCYTES # BLD: 0.5 K/UL (ref 1–4)
LYMPHOCYTES NFR BLD: 3 %
LYMPHOCYTES NFR BLD: 3 %
LYMPHOCYTES NFR BLD: 4 %
LYMPHOCYTES NFR BLD: 6 %
MAGNESIUM SERPL-MCNC: 2.3 MG/DL (ref 1.8–2.5)
MAGNESIUM SERPL-MCNC: 2.4 MG/DL (ref 1.8–2.5)
MCH RBC QN AUTO: 29 PG (ref 27–32)
MCH RBC QN AUTO: 29.4 PG (ref 27–32)
MCH RBC QN AUTO: 29.4 PG (ref 27–32)
MCH RBC QN AUTO: 29.7 PG (ref 27–32)
MCH RBC QN AUTO: 29.8 PG (ref 27–32)
MCH RBC QN AUTO: 29.9 PG (ref 27–32)
MCHC RBC AUTO-ENTMCNC: 31.6 G/DL (ref 32–37)
MCHC RBC AUTO-ENTMCNC: 31.9 G/DL (ref 32–37)
MCHC RBC AUTO-ENTMCNC: 32 G/DL (ref 32–37)
MCHC RBC AUTO-ENTMCNC: 32.4 G/DL (ref 32–37)
MCHC RBC AUTO-ENTMCNC: 32.6 G/DL (ref 32–37)
MCHC RBC AUTO-ENTMCNC: 32.9 G/DL (ref 32–37)
MCV RBC AUTO: 90.9 FL (ref 80–100)
MCV RBC AUTO: 91 FL (ref 80–100)
MCV RBC AUTO: 91.2 FL (ref 80–100)
MCV RBC AUTO: 91.6 FL (ref 80–100)
MCV RBC AUTO: 91.9 FL (ref 80–100)
MCV RBC AUTO: 92.8 FL (ref 80–100)
METAMYELOCYTES # BLD MANUAL: 0.11 K/UL
METAMYELOCYTES NFR BLD: 1 %
METAPNEUMOVIRUS PCR:: NEGATIVE
MONOCYTES # BLD: 0.1 K/UL (ref 0–1)
MONOCYTES # BLD: 0.2 K/UL (ref 0–1)
MONOCYTES # BLD: 0.3 K/UL (ref 0–1)
MONOCYTES # BLD: 0.4 K/UL (ref 0–1)
MONOCYTES # BLD: 0.5 K/UL (ref 0–1)
MONOCYTES # BLD: 0.8 K/UL (ref 0–1)
MONOCYTES NFR BLD: 2 %
MONOCYTES NFR BLD: 2 %
MONOCYTES NFR BLD: 3 %
MONOCYTES NFR BLD: 5 %
MONOCYTES NFR BLD: 5 %
MONOCYTES NFR BLD: 9 %
MYCOPLASMA PNEUMONIA PCR:: NEGATIVE
NEUTROPHILS # BLD AUTO: 10.1 K/UL (ref 1.8–7.7)
NEUTROPHILS # BLD AUTO: 10.3 K/UL (ref 1.8–7.7)
NEUTROPHILS # BLD AUTO: 7.4 K/UL (ref 1.8–7.7)
NEUTROPHILS # BLD AUTO: 7.9 K/UL (ref 1.8–7.7)
NEUTROPHILS # BLD AUTO: 8.1 K/UL (ref 1.8–7.7)
NEUTROPHILS # BLD AUTO: 9.1 K/UL (ref 1.8–7.7)
NEUTROPHILS NFR BLD: 85 %
NEUTROPHILS NFR BLD: 92 %
NEUTROPHILS NFR BLD: 93 %
NEUTROPHILS NFR BLD: 94 %
NEUTS BAND NFR BLD: 1 %
NEUTS BAND NFR BLD: 2 %
NONHDLC SERPL-MCNC: 103 MG/DL
OSMOLALITY UR CALC.SUM OF ELEC: 312 MOSM/KG (ref 275–295)
OSMOLALITY UR CALC.SUM OF ELEC: 313 MOSM/KG (ref 275–295)
OSMOLALITY UR CALC.SUM OF ELEC: 315 MOSM/KG (ref 275–295)
OSMOLALITY UR CALC.SUM OF ELEC: 316 MOSM/KG (ref 275–295)
OSMOLALITY UR CALC.SUM OF ELEC: 317 MOSM/KG (ref 275–295)
OSMOLALITY UR CALC.SUM OF ELEC: 322 MOSM/KG (ref 275–295)
OSMOLALITY UR CALC.SUM OF ELEC: 326 MOSM/KG (ref 275–295)
PARAINFLUENZA 1 PCR:: NEGATIVE
PARAINFLUENZA 2 PCR:: NEGATIVE
PARAINFLUENZA 3 PCR:: NEGATIVE
PARAINFLUENZA 4 PCR:: NEGATIVE
PLATELET # BLD AUTO: 109 K/UL (ref 140–400)
PLATELET # BLD AUTO: 121 K/UL (ref 140–400)
PLATELET # BLD AUTO: 127 K/UL (ref 140–400)
PLATELET # BLD AUTO: 129 K/UL (ref 140–400)
PLATELET # BLD AUTO: 137 K/UL (ref 140–400)
PLATELET # BLD AUTO: 148 K/UL (ref 140–400)
PMV BLD AUTO: 11.2 FL (ref 7.4–10.3)
PMV BLD AUTO: 11.6 FL (ref 7.4–10.3)
PMV BLD AUTO: 11.6 FL (ref 7.4–10.3)
PMV BLD AUTO: 11.7 FL (ref 7.4–10.3)
PMV BLD AUTO: 11.7 FL (ref 7.4–10.3)
PMV BLD AUTO: 12 FL (ref 7.4–10.3)
POTASSIUM SERPL-SCNC: 4.5 MMOL/L (ref 3.3–5.1)
POTASSIUM SERPL-SCNC: 4.7 MMOL/L (ref 3.3–5.1)
POTASSIUM SERPL-SCNC: 4.7 MMOL/L (ref 3.3–5.1)
POTASSIUM SERPL-SCNC: 5 MMOL/L (ref 3.3–5.1)
POTASSIUM SERPL-SCNC: 5.1 MMOL/L (ref 3.3–5.1)
POTASSIUM SERPL-SCNC: 5.1 MMOL/L (ref 3.3–5.1)
POTASSIUM SERPL-SCNC: 5.2 MMOL/L (ref 3.3–5.1)
RBC # BLD AUTO: 2.94 M/UL (ref 4.5–5.9)
RBC # BLD AUTO: 2.95 M/UL (ref 4.5–5.9)
RBC # BLD AUTO: 3.01 M/UL (ref 4.5–5.9)
RBC # BLD AUTO: 3.07 M/UL (ref 4.5–5.9)
RBC # BLD AUTO: 3.08 M/UL (ref 4.5–5.9)
RBC # BLD AUTO: 3.27 M/UL (ref 4.5–5.9)
RHINOVIRUS/ENTERO PCR:: NEGATIVE
RSV RNA SPEC QL NAA+PROBE: NEGATIVE
SODIUM SERPL-SCNC: 135 MMOL/L (ref 136–144)
SODIUM SERPL-SCNC: 138 MMOL/L (ref 136–144)
SODIUM SERPL-SCNC: 139 MMOL/L (ref 136–144)
SODIUM SERPL-SCNC: 140 MMOL/L (ref 136–144)
TRIGL SERPL-MCNC: 263 MG/DL (ref 1–149)
TROPONIN I SERPL-MCNC: 0.06 NG/ML (ref ?–0.03)
TROPONIN I SERPL-MCNC: 0.06 NG/ML (ref ?–0.03)
WBC # BLD AUTO: 11 K/UL (ref 4–11)
WBC # BLD AUTO: 11.1 K/UL (ref 4–11)
WBC # BLD AUTO: 8 K/UL (ref 4–11)
WBC # BLD AUTO: 8.8 K/UL (ref 4–11)
WBC # BLD AUTO: 9.1 K/UL (ref 4–11)
WBC # BLD AUTO: 9.7 K/UL (ref 4–11)

## 2018-01-01 PROCEDURE — 99232 SBSQ HOSP IP/OBS MODERATE 35: CPT | Performed by: INTERNAL MEDICINE

## 2018-01-01 PROCEDURE — 99222 1ST HOSP IP/OBS MODERATE 55: CPT | Performed by: INTERNAL MEDICINE

## 2018-01-01 PROCEDURE — 71045 X-RAY EXAM CHEST 1 VIEW: CPT | Performed by: EMERGENCY MEDICINE

## 2018-01-01 RX ORDER — DILTIAZEM HYDROCHLORIDE 60 MG/1
60 TABLET, FILM COATED ORAL AS NEEDED
Status: COMPLETED | OUTPATIENT
Start: 2018-01-01 | End: 2018-01-01

## 2018-01-01 RX ORDER — CHOLECALCIFEROL (VITAMIN D3) 1250 MCG
50000 CAPSULE ORAL WEEKLY
Status: ON HOLD | COMMUNITY
Start: 2018-01-01 | End: 2019-01-01

## 2018-01-01 RX ORDER — IPRATROPIUM BROMIDE AND ALBUTEROL SULFATE 2.5; .5 MG/3ML; MG/3ML
3 SOLUTION RESPIRATORY (INHALATION) EVERY 6 HOURS PRN
Status: DISCONTINUED | OUTPATIENT
Start: 2018-01-01 | End: 2018-01-01

## 2018-01-01 RX ORDER — DILTIAZEM HYDROCHLORIDE 300 MG/1
300 CAPSULE, COATED, EXTENDED RELEASE ORAL DAILY
Status: DISCONTINUED | OUTPATIENT
Start: 2018-01-01 | End: 2018-01-01

## 2018-01-01 RX ORDER — DILTIAZEM HYDROCHLORIDE 5 MG/ML
INJECTION INTRAVENOUS
Status: DISPENSED
Start: 2018-01-01 | End: 2018-01-01

## 2018-01-01 RX ORDER — ACETAMINOPHEN 325 MG/1
650 TABLET ORAL EVERY 6 HOURS PRN
Status: DISCONTINUED | OUTPATIENT
Start: 2018-01-01 | End: 2018-01-01

## 2018-01-01 RX ORDER — ONDANSETRON 2 MG/ML
4 INJECTION INTRAMUSCULAR; INTRAVENOUS EVERY 6 HOURS PRN
Status: DISCONTINUED | OUTPATIENT
Start: 2018-01-01 | End: 2018-01-01

## 2018-01-01 RX ORDER — DILTIAZEM HYDROCHLORIDE 5 MG/ML
5 INJECTION INTRAVENOUS ONCE
Status: COMPLETED | OUTPATIENT
Start: 2018-01-01 | End: 2018-01-01

## 2018-01-01 RX ORDER — DILTIAZEM HYDROCHLORIDE 120 MG/1
120 CAPSULE, COATED, EXTENDED RELEASE ORAL DAILY
Status: DISCONTINUED | OUTPATIENT
Start: 2018-01-01 | End: 2018-01-01

## 2018-01-01 RX ORDER — METHYLPREDNISOLONE SODIUM SUCCINATE 40 MG/ML
20 INJECTION, POWDER, LYOPHILIZED, FOR SOLUTION INTRAMUSCULAR; INTRAVENOUS EVERY 12 HOURS
Status: DISCONTINUED | OUTPATIENT
Start: 2018-01-01 | End: 2018-01-01

## 2018-01-01 RX ORDER — IPRATROPIUM BROMIDE AND ALBUTEROL SULFATE 2.5; .5 MG/3ML; MG/3ML
3 SOLUTION RESPIRATORY (INHALATION)
Status: DISCONTINUED | OUTPATIENT
Start: 2018-01-01 | End: 2018-01-01

## 2018-01-01 RX ORDER — METOCLOPRAMIDE HYDROCHLORIDE 5 MG/ML
10 INJECTION INTRAMUSCULAR; INTRAVENOUS EVERY 8 HOURS PRN
Status: DISCONTINUED | OUTPATIENT
Start: 2018-01-01 | End: 2018-01-01

## 2018-01-01 RX ORDER — PREDNISONE 20 MG/1
20 TABLET ORAL
Status: DISCONTINUED | OUTPATIENT
Start: 2018-01-01 | End: 2018-01-01

## 2018-01-01 RX ORDER — FUROSEMIDE 10 MG/ML
40 INJECTION INTRAMUSCULAR; INTRAVENOUS ONCE
Status: COMPLETED | OUTPATIENT
Start: 2018-01-01 | End: 2018-01-01

## 2018-01-01 RX ORDER — IPRATROPIUM BROMIDE AND ALBUTEROL SULFATE 2.5; .5 MG/3ML; MG/3ML
3 SOLUTION RESPIRATORY (INHALATION) EVERY 6 HOURS
Status: DISCONTINUED | OUTPATIENT
Start: 2018-01-01 | End: 2018-01-01

## 2018-01-01 RX ORDER — SODIUM CHLORIDE 0.9 % (FLUSH) 0.9 %
3 SYRINGE (ML) INJECTION AS NEEDED
Status: DISCONTINUED | OUTPATIENT
Start: 2018-01-01 | End: 2018-01-01

## 2018-01-01 RX ORDER — ALBUTEROL SULFATE 2.5 MG/3ML
10 SOLUTION RESPIRATORY (INHALATION) CONTINUOUS
Status: DISCONTINUED | OUTPATIENT
Start: 2018-01-01 | End: 2018-01-01 | Stop reason: ALTCHOICE

## 2018-01-01 RX ORDER — METHYLPREDNISOLONE SODIUM SUCCINATE 40 MG/ML
40 INJECTION, POWDER, LYOPHILIZED, FOR SOLUTION INTRAMUSCULAR; INTRAVENOUS EVERY 6 HOURS
Status: DISCONTINUED | OUTPATIENT
Start: 2018-01-01 | End: 2018-01-01

## 2018-01-01 RX ORDER — DILTIAZEM HYDROCHLORIDE 240 MG/1
240 CAPSULE, COATED, EXTENDED RELEASE ORAL DAILY
Status: DISCONTINUED | OUTPATIENT
Start: 2018-01-01 | End: 2018-01-01

## 2018-01-01 RX ORDER — IPRATROPIUM BROMIDE AND ALBUTEROL SULFATE 2.5; .5 MG/3ML; MG/3ML
3 SOLUTION RESPIRATORY (INHALATION) ONCE
Status: COMPLETED | OUTPATIENT
Start: 2018-01-01 | End: 2018-01-01

## 2018-01-01 RX ORDER — DILTIAZEM HYDROCHLORIDE 300 MG/1
300 CAPSULE, COATED, EXTENDED RELEASE ORAL DAILY
Qty: 30 CAPSULE | Refills: 3 | Status: ON HOLD | OUTPATIENT
Start: 2018-01-01 | End: 2019-01-01

## 2018-01-01 RX ORDER — DEXTROSE MONOHYDRATE 25 G/50ML
50 INJECTION, SOLUTION INTRAVENOUS AS NEEDED
Status: DISCONTINUED | OUTPATIENT
Start: 2018-01-01 | End: 2018-01-01

## 2018-01-01 RX ORDER — FUROSEMIDE 10 MG/ML
40 INJECTION INTRAMUSCULAR; INTRAVENOUS DAILY
Status: DISCONTINUED | OUTPATIENT
Start: 2018-01-01 | End: 2018-01-01

## 2018-01-01 RX ORDER — DILTIAZEM HYDROCHLORIDE 120 MG/1
120 CAPSULE, COATED, EXTENDED RELEASE ORAL ONCE
Status: COMPLETED | OUTPATIENT
Start: 2018-01-01 | End: 2018-01-01

## 2018-01-01 RX ORDER — HEPARIN SODIUM 5000 [USP'U]/ML
5000 INJECTION, SOLUTION INTRAVENOUS; SUBCUTANEOUS EVERY 12 HOURS SCHEDULED
Status: DISCONTINUED | OUTPATIENT
Start: 2018-01-01 | End: 2018-01-01

## 2018-01-01 RX ORDER — ALENDRONATE SODIUM 10 MG/1
10 TABLET ORAL WEEKLY
Status: ON HOLD | COMMUNITY
Start: 2018-01-01 | End: 2019-01-01

## 2018-12-21 PROBLEM — J44.1 ACUTE EXACERBATION OF CHRONIC OBSTRUCTIVE PULMONARY DISEASE (COPD) (HCC): Status: ACTIVE | Noted: 2018-01-01

## 2018-12-21 PROBLEM — I50.9 ACUTE ON CHRONIC CONGESTIVE HEART FAILURE, UNSPECIFIED HEART FAILURE TYPE (HCC): Status: ACTIVE | Noted: 2018-01-01

## 2018-12-21 NOTE — ED PROVIDER NOTES
Patient Seen in: [facility] AND [facility] Emergency Department    History   Patient presents with:  Dyspnea HECTOR SOB (respiratory)    Stated Complaint: shortness of breath, productive cough, denies fever, on 2L o2 at home    HPI    59-year-old male presents for Negative. Neurological: Negative. All other systems reviewed and are negative. Positive for stated complaint: shortness of breath, productive cough, denies fever, on 2L o2 at home  Other systems are as noted in HPI.   Constitutional and vital sig Abnormal; Notable for the following components:    Beta Natriuretic Peptide 772 (*)     All other components within normal limits   TROPONIN I - Abnormal; Notable for the following components:    Troponin 0.06 (*)     All other components within normal mcfarland Ap Portable  (cpt=71045)    Result Date: 12/21/2018  PROCEDURE: XR CHEST AP PORTABLE (CPT=71010) TIME: 14:39. COMPARISON: [facility], XR CHEST AP PORTABLE (CPT=71010), 1/14/2017, 7:19.   [facility], XR CHEST AP PORTABLE (CP schedule follow up care with a primary care provider within the next three months to obtain basic health screening including reassessment of your blood pressure.     Medications Prescribed:  Current Discharge Medication List        Present on Admission  Cl

## 2018-12-21 NOTE — CONSULTS
[facility]  [facility] Cardiology Consult Note    Sonya Captbakari Danni Patient Status:  Emergency    1940 MRN [id number]   Location [address] Attending  E  [address] Day # 0 PCP Arelis Ogden disease)     s/p CABG   • Cataract    • COPD (chronic obstructive pulmonary disease) (HCC)    • Coronary atherosclerosis    • Diabetes ([facility] [address]    • Disorder of thyroid    • High blood pressure    • High cholesterol    • Hyperlipidemia    • Pneumothorax     f

## 2018-12-21 NOTE — H&P
[facility] Hospitalist H&P       CC: Patient presents with:  Dyspnea HECTOR SOB (respiratory)       PCP: Arelis Contreras MD    Date of Admission: 12/21/2018  2:21 PM    ASSESSMENT / PLAN:     Mr. Catie Whiteside is a 67 yo M with PMH Of CAD s/p CABG, COPD on ch CABG, COPD on chronic prednisone, DM2, HTN, hypothyroidism who presented with shortness of breath. Patient states symptoms have been worsening over the past week. He normally takes prednisone 20 mg daily for the past >10 years.  Took 60 mg today without imp 30.4*   MCV  92.8   MCH  29.4   MCHC  31.6*   RDW  14.5   WBC  11.1*   PLT  148         Recent Labs   Lab  12/21/18   1431   GLU  385*   BUN  42*   CREATSERUM  1.33   GFRAA  59*   GFRNAA  51*   CA  8.6   NA  140   K  5.2*   CL  98   CO2  29     Lab Results

## 2018-12-21 NOTE — HISTORICAL OFFICE NOTE
Nancy Maldonado  [phone]  : 1940  ACCOUNT:  [id number]  Hospital: [facility]    DATE:  2015    DISCHARGE SUMMARY    HOSPITAL COURSE: A 79-year-old patient who had been in [facility] on 02/16/15 with a recurren

## 2018-12-21 NOTE — CONSULTS
[facility] HOSP - [facility]    Report of Consultation    [address] Patient Status:  Emergency    1940 MRN [id number]   Location [address] Attending [address] Day # 0 PCP Arelis Ocampo illness  HEENT: denies headache, sore throat, vision loss  Cardio: denies chest pain, chest pressure, palpitations  Respiratory: dyspnea, cough, wheezing, denies hemoptysis   GI: denies nausea, vomiting, abdominal pain  : denies dysuria, hematuria  Muscu respiratory failure  3. CHF  4. Pulmonary hypertension  5.   Prior pulmonary embolism    Plan   -Patient presents with evidence of increased dyspnea with exertion and lower extremity edema over the last 2-3 days  -Acute decompensation likely secondary to

## 2018-12-22 NOTE — PROGRESS NOTES
[facility] HOSP - [facility]    Cardiology Progress Note    Emir Razo Patient Status:  Inpatient    1940 MRN [id number]   Location [facility] 3W/SW Attending Uriah Rodriguez MD   Hosp Day # 1 PCP MD Raymundo Archibald diuretics and monitor volume status closely     Afib  - rate goal <130  -will d/c IV diltiazem and resume home PO cardizem 240 mg daily  - continue eliquis 5 mg BID  -cont to monitor tele     CAD s/p CABG 10 years ago, follows at [facility].   Recent 12/21/2018  ECG Report  Interpretation  -------------------------- Atrial flutter-fibrillation -RSR(V1).  -Left axis -anterior fascicular block. - Nonspecific T-abnormality.  ABNORMAL When compared with ECG of 01/04/2017 05:24:27 atrila flutter is new and

## 2018-12-22 NOTE — PROGRESS NOTES
[facility]    Progress Note      Assessment and Plan:     1. Acute on chronic respiratory failure–the patient has severe COPD with possible component of mild edema contributing.   He is slightly better clinically and notes that he is anxiou [facility]  Medical Director, [address] [facility]. [address]  Pager: [phone]

## 2018-12-22 NOTE — PLAN OF CARE
Problem: Patient Centered Care  Goal: Patient preferences are identified and integrated in the patient's plan of care  Interventions:  - What would you like us to know as we care for you?  - Provide timely, complete, and accurate information to patient/fam Initiate emergency measures for life threatening arrhythmias  - Monitor electrolytes and administer replacement therapy as ordered  Outcome: Progressing      Comments: He is on Cardizem drip at 5cc/Hr and the heart rate is getting better

## 2018-12-22 NOTE — DIABETES ED
Patient educated regarding diabetes diet basics. Discussed carbohydrate foods, portion sizes, and food label reading. Handout given and initial meal plan provided. Encouraged to attend outpatient diabetes education.  Pt states that his    Sugars are higher

## 2018-12-22 NOTE — PROGRESS NOTES
[facility] Hospitalist Progress Note     PCP: Arelis Contreras MD    CC: Follow up       Assessment/Plan:     Mr. Ishaan Reddy is a 65 yo M with PMH Of CAD s/p CABG, COPD on chronic prednisone, DM2, HTN, hypothyroidism who presented with shortness of breat Weights  12/22/18 0421 : 157 lb 14.4 oz (71.6 kg)  12/21/18 1900 : 158 lb 12.8 oz (72 kg)  12/21/18 1421 : 141 lb 1.5 oz (64 kg)  01/31/17 1058 : 140 lb (63.5 kg)  01/24/17 0500 : 155 lb 11.2 oz (70.6 kg)  01/23/17 0516 : 160 lb 1.6 oz (72.6 kg)  01/22/17 12/21/18   1431  12/22/18   0521   WBC  11.1*  8.0   HGB  9.6*  8.7*   MCV  92.8  91.2   PLT  148  109*   BAND  1   --        Recent Labs   Lab  12/21/18   1431  12/22/18   0521   NA  140  138   K  5.2*  4.5   CL  98  98   CO2  29  32   BUN  42*  46*   CRE

## 2018-12-23 NOTE — PROGRESS NOTES
[facility] Hospitalist Progress Note     PCP: Arelis Contreras MD    CC: Follow up       Assessment/Plan:     Mr. Rose Cutler is a 65 yo M with PMH Of CAD s/p CABG, COPD on chronic prednisone, DM2, HTN, hypothyroidism who presented with shortness of breat SSI  - BS elevated due to steroids, see above        Anemia, chronic, normocytic  -hgb 7.4 in 1/2017 per chart review  -now in 8-9 range, monitor closely       GOC  - FULL code, confirmed with patient     FN:  - IVF: none  - Diet: cardiac/carb     DVT Prop nondistended, no organomegaly, bowel sounds present  MSK:  no clubbing, no cyanosis.   Chronic LE edema to ankles   Skin: no rashes or lesions, well perfused  Psych: mood stable, cooperative  Neuro: no focal deficits    Medications     • [START ON 12/24/201 disease. Prominent bilateral hilar regions stable.      Dictated by (CST): Namita Conrad MD on 12/21/2018 at 15:01     Approved by (CST): Namita Conrad MD on 12/21/2018 at 15:04

## 2018-12-23 NOTE — PLAN OF CARE
Problem: Patient Centered Care  Goal: Patient preferences are identified and integrated in the patient's plan of care  Interventions:  - What would you like us to know as we care for you?  I want to be home for [place]  - Provide timely, complete, and acc arrhythmias  - Monitor electrolytes and administer replacement therapy as ordered  Outcome: Progressing      Comments: Patients blood sugar is still elevated in the 400's tonight and gave 17 units of tia log and 12 units of lev ayaan

## 2018-12-23 NOTE — PLAN OF CARE
Problem: Patient Centered Care  Goal: Patient preferences are identified and integrated in the patient's plan of care  Interventions:  - What would you like us to know as we care for you?  I want to be home for [place]  - Provide timely, complete, and acc threatening arrhythmias  - Monitor electrolytes and administer replacement therapy as ordered  Outcome: Not Progressing      Comments: CARDIZEM DRIP D/C'ED, STARTED ON P.O., CONTINUES IN A. FIB, HR INCREASES WITH ACTIVITY, SOB WITH ACTIVITY, CONTINUING SOL

## 2018-12-23 NOTE — PROGRESS NOTES
[facility] HOSP - [facility]    Cardiology Progress Note    Estefany Razo Patient Status:  Inpatient    1940 MRN [id number]   Location [facility] 3W/SW Attending Tere Lopez MD   [address] [facility] [address] [facility] [address] [facility] [address] Day # 2 PCP MD Riya Archibald home   -cont to hold diuretics and monitor volume status closely  -Creat elevated today 1.85     Afib  - rate goal <130  -increase PO cardizem to 300 mg daily.   Will give extra one time po dose today to equal new dose.  -continue eliquis 5 mg BID   -cont t Electronically signed on 12/21/2018 at 17:16 by Alyssa Recinos MD    Ekg 12-lead    Result Date: 12/21/2018  ECG Report  Interpretation  -------------------------- Atrial flutter-fibrillation -RSR(V1).  -Left axis -anterior fascicular block.   - Nonspecifi

## 2018-12-23 NOTE — PROGRESS NOTES
[facility]    Progress Note      Assessment and Plan:     1. Acute on chronic respiratory failure–the patient has severe COPD with possible component of mild edema contributing. The diuretic is held as the creatinine jumped to 1.8.   He is MD  Medical Director, [address], [address] [facility]  Medical Director, [address] [place] [address] [address]  Pager:  [phone]

## 2018-12-24 NOTE — PROGRESS NOTES
[facility] AND [facility]  [facility] Cardiology Progress Note    Jihan Razo Patient Status:  Inpatient    1940 MRN [id number]   Location [facility] 3W/SW Attending Da Louise DO   Hosp Day # 3 PCP Arelis Contreras MD     66year old No family history on file. reports that he has quit smoking. he has never used smokeless tobacco. He reports that he does not drink alcohol or use drugs.     Objective:   Temp: 98.1 °F (36.7 °C)  Pulse: 126  Resp: 20  BP: 105/57    Intake/Output:

## 2018-12-24 NOTE — PLAN OF CARE
Problem: Patient Centered Care  Goal: Patient preferences are identified and integrated in the patient's plan of care  Interventions:  - What would you like us to know as we care for you?  I want to be home for [place]  - Provide timely, complete, and acc threatening arrhythmias  - Monitor electrolytes and administer replacement therapy as ordered  Outcome: Not Progressing      Comments: PT. REMAINS IN A. FIB, HR'S INCREASE 'S - 140'S WITH ANY ACTIVITY, SOB WITH ACTIVITY, BLOOD SUGARS VERY HIGH, NOT C

## 2018-12-24 NOTE — PROGRESS NOTES
[facility]    Endocrine Progress Note  [facility]    Shante Razo Patient Status:  Inpatient    1940 MRN [id number]   Location [facility] 3W/SW Attending Casie Bliss, [address] [facility] Day # 3 PCP Arelis Cabrera 50 mL, 50 mL, Intravenous, PRN  •  Glucose-Vitamin C (DEX-4) 4-6 GM-MG chewable tab 4 tablet, 4 tablet, Oral, Q15 Min PRN    Objective:   Blood pressure 105/57, pulse (!) 126, temperature 98.1 °F (36.7 °C), temperature source Oral, resp.  rate 20, weight 15

## 2018-12-24 NOTE — PROGRESS NOTES
[facility]     Progress Note        Josh Razo Patient Status:  Inpatient    1940 MRN [id number]   Location [facility] 3W/SW Attending Chelsea Singh DO   Hosp Day # 3 PCP Arelis Contreras MD       Subjective: Q15 Min PRN   diltiazem 100mg/100ml in NaCl (CARDIZEM) 1 mg/mL premix/add-vantage 2.5-20 mg/hr Intravenous Continuous   dextrose 50 % injection 50 mL 50 mL Intravenous PRN   Glucose-Vitamin C (DEX-4) 4-6 GM-MG chewable tab 4 tablet 4 tablet Oral Q15 Min ID

## 2018-12-24 NOTE — CONSULTS
[facility]    Report of Endocrinology Consultation  [facility]    [address] Patient Status:  Inpatient    1940 MRN [id number]   Location [facility] 3W/SW Attending Jose Chaudhari MD   [facility] Day # 2 PC BID  •  Insulin Aspart Pen (NOVOLOG) 100 UNIT/ML flexpen 1-11 Units, 1-11 Units, Subcutaneous, TID CC  •  ipratropium-albuterol (DUONEB) nebulizer solution 3 mL, 3 mL, Nebulization, Q6H  •  Fluticasone Furoate-Vilanterol (BREO ELLIPTA) 200-25 MCG/INH inhal Ancillaries:    Lab Results   Component Value Date    WBC 9.7 12/23/2018    HGB 9.0 12/23/2018    HCT 27.4 12/23/2018     12/23/2018    CREATSERUM 1.77 12/23/2018    BUN 62 12/23/2018     12/23/2018    K 5.1 12/23/2018    CL 93 12/23/2018    C

## 2018-12-24 NOTE — PROGRESS NOTES
[facility] Hospitalist Progress Note     PCP: Arelis Contreras MD    CC: Follow up       Assessment/Plan:   Mr. Harjit Flowers is a 65 yo M with PMH Of CAD s/p CABG, COPD on chronic prednisone, DM2, HTN, hypothyroidism who presented with shortness of breath. PIV     Dispo: pending clinical course    Questions/concerns were discussed with patient and/or family by bedside. Thank Carol Guerrero DO  [facility] Hospitalist  Answering Service: [phone]    Subjective     Feels a little better today.  Davida gomez intention tremor, no focal deficits    Medications     • ipratropium-albuterol  3 mL Nebulization QID   • insulin detemir  15 Units Subcutaneous BID   • Insulin Aspart Pen  14 Units Subcutaneous TID CC   • DilTIAZem HCl ER Coated Beads  300 mg Oral Daily

## 2018-12-25 NOTE — PLAN OF CARE
Problem: Patient Centered Care  Goal: Patient preferences are identified and integrated in the patient's plan of care  Interventions:  - What would you like us to know as we care for you?  I want to be home for [place]  - Provide timely, complete, and acc arrhythmias  - Monitor electrolytes and administer replacement therapy as ordered  Outcome: Progressing      Problem: RESPIRATORY - ADULT  Goal: Achieves optimal ventilation and oxygenation  INTERVENTIONS:  - Assess for changes in respiratory status  - Ass

## 2018-12-25 NOTE — PROGRESS NOTES
[facility]    Progress Note      Assessment and Plan:     1. Acute on chronic respiratory failure–the patient has severe COPD with possible component of mild edema contributing. The diuretic is held as the creatinine jumped to 1.8.   The c HCT 27.1 12/25/2018     12/25/2018    CREATSERUM 1.32 12/25/2018    BUN 69 12/25/2018     12/25/2018    K 5.1 12/25/2018     12/25/2018    CO2 31 12/25/2018     12/25/2018    CA 8.2 12/25/2018    MG 2.3 12/25/2018     Chest x-r

## 2018-12-25 NOTE — PROGRESS NOTES
[facility] - [facility]    Endocrine Progress Note  [facility]    Nolvia Razo Patient Status:  Inpatient    1940 MRN [id number]   Location [facility] 3W/SW Attending Roxana Rodriguez MD   [address] [facility] Day # 4 PCP Arelis Ocampo Intravenous, PRN  •  Glucose-Vitamin C (DEX-4) 4-6 GM-MG chewable tab 4 tablet, 4 tablet, Oral, Q15 Min PRN    Objective:   Blood pressure 139/76, pulse (!) 137, temperature 97.8 °F (36.6 °C), temperature source Oral, resp.  rate 18, weight 156 lb 14.4 oz,

## 2018-12-25 NOTE — PLAN OF CARE
Problem: Patient Centered Care  Goal: Patient preferences are identified and integrated in the patient's plan of care  Interventions:  - What would you like us to know as we care for you?  I want to be home for [place]  - Provide timely, complete, and acc arrhythmias  - Monitor electrolytes and administer replacement therapy as ordered  Outcome: Progressing      Problem: RESPIRATORY - ADULT  Goal: Achieves optimal ventilation and oxygenation  INTERVENTIONS:  - Assess for changes in respiratory status  - Ass

## 2018-12-25 NOTE — PROGRESS NOTES
[facility]  [facility] Cardiology Progress Note    Luda Razo Patient Status:  Inpatient    1940 MRN [id number]   Location [facility] 3W/SW Attending Ibeth Jesus, DO   Hosp Day # 4 PCP Arelis Contreras MD     66year old Pneumothorax     failed pleurodesis, s/p decortication     Past Surgical History:   Procedure Laterality Date   • OPEN HEART SURGICAL PROFILE       No family history on file. reports that he has quit smoking.  he has never used smokeless tobacco. He repor

## 2018-12-25 NOTE — PROGRESS NOTES
[facility] Hospitalist Progress Note     PCP: Arelis Contreras MD    CC: Follow up       Assessment/Plan:   Mr. Jojo Li is a 65 yo M with PMH Of CAD s/p CABG, COPD on chronic prednisone, DM2, HTN, hypothyroidism who presented with shortness of breath. 8-9 range, monitor closely     GOC  - FULL code, confirmed with patient     FN:  - IVF: none  - Diet: cardiac/carb     DVT Prophy: SCD, eliquis  Lines: PIV     Dispo: pending clinical course    Questions/concerns were discussed with patient and/or family b no clubbing, no cyanosis.   Chronic LE edema to ankles   Skin: no rashes or lesions, well perfused  Psych: mood stable, cooperative  Neuro: intention tremor, no focal deficits    Medications     • predniSONE  20 mg Oral Daily with breakfast   • ipratropium

## 2018-12-26 NOTE — TRANSITION NOTE
Presents with SOB for the past week, started with minimal symptoms and gradually became worse over the week, now unable to walk across the room without SOB. Also associated with chest pain, described as a tightness with deep breaths.         65 year old ma (four) hours as needed for Wheezing. Refills:  0     alendronate 10 MG Tabs  Commonly known as:  FOSAMAX      Take 10 mg by mouth once a week.    Refills:  0     apixaban 2.5 MG Tabs  Commonly known as:  ELIQUIS      Take 5 mg by mouth 2 (two) times daily 97367 units Caps      Take 50,000 Units by mouth once a week.    Refills:  0        STOP taking these medications    DilTIAZem HCl  MG Cp24  Commonly known as:  DILACOR XR        [facility]              Where to Get Your Medications

## 2018-12-26 NOTE — PROGRESS NOTES
[facility]    Progress Note      Assessment and Plan:     1. Acute on chronic respiratory failure–the patient has severe COPD with possible component of mild edema contributing. The patient is approximating his baseline self.     Recommend CA 7.9 12/26/2018    MG 2.4 12/26/2018     Chest x-ray–mild pulmonary edema superimposed on COPD    Chandan Carbone MD  Medical Director, [address] [facility]  Medical Director, [facility]  Pager:  [phone]

## 2018-12-26 NOTE — PLAN OF CARE
Problem: Patient Centered Care  Goal: Patient preferences are identified and integrated in the patient's plan of care  Interventions:  - What would you like us to know as we care for you?  I want to be home for [place]  - Provide timely, complete, and acc arrhythmias  - Monitor electrolytes and administer replacement therapy as ordered  Outcome: Progressing      Problem: RESPIRATORY - ADULT  Goal: Achieves optimal ventilation and oxygenation  INTERVENTIONS:  - Assess for changes in respiratory status  - Ass

## 2018-12-26 NOTE — CM/SW NOTE
ADDENDUM 2:31pm    SW received MDO for HHC. Sw followed up with pt one more time regarding HHC. Pt states that he does not want [place] [id number] services at this time. Social work/case management to remain available for support and/or discharge planning.          10:39a

## 2018-12-26 NOTE — DISCHARGE SUMMARY
General Medicine Discharge Summary     Patient ID:  Rani Razo  66year old  5/20/1940    Admit date: 12/21/2018    Discharge date and time: 12/26/18    Attending Physician: Mark Casas MD     Consults: IP CONSULT TO PULMONOLOGY  IP CONSULT TO [address] [place] [address] Mr. Sonja Grant is a 65 yo M with PMH Of CAD s/p CABG, COPD on chronic prednisone, DM2, HTN, hypothyroidism who presented with shortness of breath. Being treated for a COPD exacerbation.   IV diuretics stopped as cr increased (improved now)  Now course complica Operative Procedures:      Imaging:         Disposition: home    Activity: activity as tolerated  Diet: diabetic diet  Wound Care: as directed  Code Status: Full Code  O2: none    Home Medication Changes:      Med list     Medication List      START taking [address]           Where to Get Your Medications      You can get these medications from any pharmacy    Bring a paper prescription for each of these medications  · DilTIAZem HCl ER Coated Beads 300 MG Cp24         FU  Follow-up Information glimepiride 2 MG Tabs  Commonly known as:  AMARYL     Insulin NPH & Regular 70/30 (70-30) 100 UNIT/ML Susp  Commonly known as:  NovoLIN 70/30     JOBST ACTIVE 15-20MMHG MEDIUM Misc  1 Package by Does not apply route See Celestino Borrero.  Wearing during t

## 2018-12-26 NOTE — PROGRESS NOTES
[facility]    Cardiology Progress Note    Anakesha Razo Patient Status:  Inpatient    1940 MRN [id number]   Location [facility] 3W/SW Attending Peg Chowdhury MD   Hosp Day # 5 PCP Arelis Contreras MD   65 year old Subcutaneous BID   • Insulin Aspart Pen  10 Units Subcutaneous TID CC   • DilTIAZem HCl ER Coated Beads  300 mg Oral Daily   • apixaban  5 mg Oral BID   • Insulin Aspart Pen  1-11 Units Subcutaneous TID CC   • Fluticasone Furoate-Vilanterol  1 puff Inhalat

## 2018-12-27 NOTE — PROGRESS NOTES
Pt A/O, RA, denies pain and shortness of breath. Pt to be discharged home today as ordered. Discharge instructions, medications/side effects, prescriptions, and follow up appointments reviewed with pt. HF dc instructions + follow up reviewed.  Pt verbalized

## 2018-12-28 NOTE — TELEPHONE ENCOUNTER
Pt son requesting to speak w dr Di Grover . States pt was just recently discharged a day and a half ago and has some questions pt is aware  is out of the office.

## 2018-12-28 NOTE — TELEPHONE ENCOUNTER
Pt's son stts he did not have time to talk w/ Dr. Jenna High in private, spoke w/ him morn of pt's discharge, & would like to have heart-to-heart conversation w/ him re: his father's status.  He also requests f/u appt w/ MD by 1/5/19 (pt discharged 12/26/18 & to

## 2018-12-29 NOTE — TELEPHONE ENCOUNTER
RN,  I spoke at length with Rima Bell. Can add patient on shikha shcedule any day within the next couple weeks.

## 2018-12-31 NOTE — TELEPHONE ENCOUNTER
Notified son of Dr. Annalisa weaver. Sched pt 1/10/19 1:15 pm [facility]. Appt info given. He voiced understanding.

## 2019-01-01 ENCOUNTER — HOSPITAL ENCOUNTER (INPATIENT)
Facility: HOSPITAL | Age: 79
LOS: 4 days | Discharge: HOME HEALTH | DRG: 189 | End: 2019-01-01
Attending: EMERGENCY MEDICINE | Admitting: HOSPITALIST
Payer: MEDICARE

## 2019-01-01 ENCOUNTER — HOSPITAL ENCOUNTER (OUTPATIENT)
Dept: NUCLEAR MEDICINE | Facility: HOSPITAL | Age: 79
Discharge: HOME | DRG: 189 | End: 2019-01-01
Attending: INTERNAL MEDICINE
Payer: MEDICARE

## 2019-01-01 ENCOUNTER — TELEPHONE (OUTPATIENT)
Dept: CARDIOLOGY UNIT | Facility: HOSPITAL | Age: 79
End: 2019-01-01

## 2019-01-01 ENCOUNTER — TELEPHONE (OUTPATIENT)
Dept: HEMATOLOGY/ONCOLOGY | Facility: HOSPITAL | Age: 79
End: 2019-01-01

## 2019-01-01 ENCOUNTER — APPOINTMENT (OUTPATIENT)
Dept: HEMATOLOGY/ONCOLOGY | Facility: HOSPITAL | Age: 79
End: 2019-01-01
Attending: INTERNAL MEDICINE
Payer: MEDICARE

## 2019-01-01 ENCOUNTER — HOSPITAL ENCOUNTER (OUTPATIENT)
Dept: RESPIRATORY THERAPY | Facility: HOSPITAL | Age: 79
Discharge: HOME | End: 2019-01-01
Attending: NURSE PRACTITIONER
Payer: MEDICARE

## 2019-01-01 ENCOUNTER — APPOINTMENT (OUTPATIENT)
Dept: CT IMAGING | Facility: HOSPITAL | Age: 79
DRG: 180 | End: 2019-01-01
Attending: EMERGENCY MEDICINE
Payer: OTHER MISCELLANEOUS

## 2019-01-01 ENCOUNTER — HOSPITAL ENCOUNTER (EMERGENCY)
Facility: HOSPITAL | Age: 79
Discharge: HOME | End: 2019-01-01
Attending: EMERGENCY MEDICINE
Payer: MEDICARE

## 2019-01-01 ENCOUNTER — APPOINTMENT (OUTPATIENT)
Dept: CV DIAGNOSTICS | Facility: HOSPITAL | Age: 79
DRG: 811 | End: 2019-01-01
Attending: HOSPITALIST
Payer: MEDICARE

## 2019-01-01 ENCOUNTER — OFFICE VISIT (OUTPATIENT)
Dept: HEMATOLOGY/ONCOLOGY | Facility: HOSPITAL | Age: 79
End: 2019-01-01
Attending: NURSE PRACTITIONER
Payer: MEDICARE

## 2019-01-01 ENCOUNTER — APPOINTMENT (OUTPATIENT)
Dept: ULTRASOUND IMAGING | Facility: HOSPITAL | Age: 79
DRG: 189 | End: 2019-01-01
Attending: INTERNAL MEDICINE
Payer: MEDICARE

## 2019-01-01 ENCOUNTER — HOSPITAL ENCOUNTER (INPATIENT)
Facility: HOSPITAL | Age: 79
LOS: 1 days | Discharge: HOSPICE | DRG: 180 | End: 2019-01-01
Attending: EMERGENCY MEDICINE | Admitting: HOSPITALIST
Payer: OTHER MISCELLANEOUS

## 2019-01-01 ENCOUNTER — APPOINTMENT (OUTPATIENT)
Dept: GENERAL RADIOLOGY | Facility: HOSPITAL | Age: 79
End: 2019-01-01
Attending: EMERGENCY MEDICINE
Payer: MEDICARE

## 2019-01-01 ENCOUNTER — HOSPITAL ENCOUNTER (OUTPATIENT)
Dept: INTERVENTIONAL RADIOLOGY/VASCULAR | Facility: HOSPITAL | Age: 79
Discharge: HOME | End: 2019-01-01
Attending: INTERNAL MEDICINE | Admitting: INTERNAL MEDICINE
Payer: MEDICARE

## 2019-01-01 ENCOUNTER — OFFICE VISIT (OUTPATIENT)
Dept: CARDIOLOGY CLINIC | Facility: HOSPITAL | Age: 79
End: 2019-01-01
Attending: NURSE PRACTITIONER
Payer: MEDICARE

## 2019-01-01 ENCOUNTER — TELEPHONE (OUTPATIENT)
Dept: PULMONOLOGY | Facility: CLINIC | Age: 79
End: 2019-01-01

## 2019-01-01 ENCOUNTER — HOSPITAL ENCOUNTER (OUTPATIENT)
Dept: MRI IMAGING | Facility: HOSPITAL | Age: 79
Discharge: HOME | End: 2019-01-01
Attending: INTERNAL MEDICINE
Payer: MEDICARE

## 2019-01-01 ENCOUNTER — APPOINTMENT (OUTPATIENT)
Dept: CT IMAGING | Facility: HOSPITAL | Age: 79
DRG: 371 | End: 2019-01-01
Attending: INTERNAL MEDICINE
Payer: MEDICARE

## 2019-01-01 ENCOUNTER — OFFICE VISIT (OUTPATIENT)
Dept: HEMATOLOGY/ONCOLOGY | Facility: HOSPITAL | Age: 79
End: 2019-01-01
Attending: PHYSICIAN ASSISTANT
Payer: MEDICARE

## 2019-01-01 ENCOUNTER — HOSPITAL ENCOUNTER (INPATIENT)
Facility: HOSPITAL | Age: 79
LOS: 13 days | Discharge: HOME HEALTH | DRG: 811 | End: 2019-01-01
Attending: EMERGENCY MEDICINE | Admitting: HOSPITALIST
Payer: MEDICARE

## 2019-01-01 ENCOUNTER — APPOINTMENT (OUTPATIENT)
Dept: CT IMAGING | Facility: HOSPITAL | Age: 79
DRG: 371 | End: 2019-01-01
Attending: EMERGENCY MEDICINE
Payer: MEDICARE

## 2019-01-01 ENCOUNTER — ANESTHESIA EVENT (OUTPATIENT)
Dept: ENDOSCOPY | Facility: HOSPITAL | Age: 79
DRG: 371 | End: 2019-01-01
Payer: MEDICARE

## 2019-01-01 ENCOUNTER — APPOINTMENT (OUTPATIENT)
Dept: CT IMAGING | Facility: HOSPITAL | Age: 79
DRG: 189 | End: 2019-01-01
Attending: INTERNAL MEDICINE
Payer: MEDICARE

## 2019-01-01 ENCOUNTER — HOSPITAL ENCOUNTER (INPATIENT)
Facility: HOSPITAL | Age: 79
LOS: 1 days | DRG: 180 | End: 2019-01-01
Attending: HOSPITALIST | Admitting: HOSPITALIST
Payer: OTHER MISCELLANEOUS

## 2019-01-01 ENCOUNTER — APPOINTMENT (OUTPATIENT)
Dept: LAB | Age: 79
End: 2019-01-01
Attending: INTERNAL MEDICINE
Payer: MEDICARE

## 2019-01-01 ENCOUNTER — APPOINTMENT (OUTPATIENT)
Dept: CV DIAGNOSTICS | Facility: HOSPITAL | Age: 79
DRG: 189 | End: 2019-01-01
Attending: NURSE PRACTITIONER
Payer: MEDICARE

## 2019-01-01 ENCOUNTER — ANESTHESIA (OUTPATIENT)
Dept: INTERVENTIONAL RADIOLOGY/VASCULAR | Facility: HOSPITAL | Age: 79
End: 2019-01-01
Payer: MEDICARE

## 2019-01-01 ENCOUNTER — APPOINTMENT (OUTPATIENT)
Dept: CT IMAGING | Facility: HOSPITAL | Age: 79
DRG: 811 | End: 2019-01-01
Attending: HOSPITALIST
Payer: MEDICARE

## 2019-01-01 ENCOUNTER — APPOINTMENT (OUTPATIENT)
Dept: GENERAL RADIOLOGY | Facility: HOSPITAL | Age: 79
DRG: 189 | End: 2019-01-01
Attending: EMERGENCY MEDICINE
Payer: MEDICARE

## 2019-01-01 ENCOUNTER — APPOINTMENT (OUTPATIENT)
Dept: CV DIAGNOSTICS | Facility: HOSPITAL | Age: 79
DRG: 811 | End: 2019-01-01
Attending: NURSE PRACTITIONER
Payer: MEDICARE

## 2019-01-01 ENCOUNTER — APPOINTMENT (OUTPATIENT)
Dept: GENERAL RADIOLOGY | Facility: HOSPITAL | Age: 79
DRG: 811 | End: 2019-01-01
Attending: INTERNAL MEDICINE
Payer: MEDICARE

## 2019-01-01 ENCOUNTER — APPOINTMENT (OUTPATIENT)
Dept: GENERAL RADIOLOGY | Facility: HOSPITAL | Age: 79
DRG: 180 | End: 2019-01-01
Attending: EMERGENCY MEDICINE
Payer: OTHER MISCELLANEOUS

## 2019-01-01 ENCOUNTER — APPOINTMENT (OUTPATIENT)
Dept: ULTRASOUND IMAGING | Facility: HOSPITAL | Age: 79
DRG: 811 | End: 2019-01-01
Attending: EMERGENCY MEDICINE
Payer: MEDICARE

## 2019-01-01 ENCOUNTER — HOSPITAL ENCOUNTER (OUTPATIENT)
Dept: CT IMAGING | Age: 79
Discharge: HOME | End: 2019-01-01
Attending: INTERNAL MEDICINE
Payer: MEDICARE

## 2019-01-01 ENCOUNTER — APPOINTMENT (OUTPATIENT)
Dept: GENERAL RADIOLOGY | Facility: HOSPITAL | Age: 79
DRG: 180 | End: 2019-01-01
Payer: OTHER MISCELLANEOUS

## 2019-01-01 ENCOUNTER — OFFICE VISIT (OUTPATIENT)
Dept: PULMONOLOGY | Facility: CLINIC | Age: 79
End: 2019-01-01
Payer: MEDICARE

## 2019-01-01 ENCOUNTER — ANESTHESIA (OUTPATIENT)
Dept: ENDOSCOPY | Facility: HOSPITAL | Age: 79
DRG: 371 | End: 2019-01-01
Payer: MEDICARE

## 2019-01-01 ENCOUNTER — APPOINTMENT (OUTPATIENT)
Dept: NUCLEAR MEDICINE | Facility: HOSPITAL | Age: 79
DRG: 189 | End: 2019-01-01
Attending: INTERNAL MEDICINE
Payer: MEDICARE

## 2019-01-01 ENCOUNTER — APPOINTMENT (OUTPATIENT)
Dept: GENERAL RADIOLOGY | Facility: HOSPITAL | Age: 79
DRG: 811 | End: 2019-01-01
Attending: EMERGENCY MEDICINE
Payer: MEDICARE

## 2019-01-01 ENCOUNTER — APPOINTMENT (OUTPATIENT)
Dept: INTERVENTIONAL RADIOLOGY/VASCULAR | Facility: HOSPITAL | Age: 79
DRG: 371 | End: 2019-01-01
Attending: INTERNAL MEDICINE
Payer: MEDICARE

## 2019-01-01 ENCOUNTER — HOSPITAL ENCOUNTER (INPATIENT)
Facility: HOSPITAL | Age: 79
LOS: 7 days | Discharge: HOME | DRG: 371 | End: 2019-01-01
Attending: EMERGENCY MEDICINE | Admitting: INTERNAL MEDICINE
Payer: MEDICARE

## 2019-01-01 VITALS
SYSTOLIC BLOOD PRESSURE: 147 MMHG | DIASTOLIC BLOOD PRESSURE: 55 MMHG | HEART RATE: 80 BPM | HEIGHT: 65 IN | OXYGEN SATURATION: 97 % | WEIGHT: 144.31 LBS | BODY MASS INDEX: 24.04 KG/M2 | TEMPERATURE: 98 F | RESPIRATION RATE: 16 BRPM

## 2019-01-01 VITALS
TEMPERATURE: 98 F | HEART RATE: 94 BPM | SYSTOLIC BLOOD PRESSURE: 138 MMHG | DIASTOLIC BLOOD PRESSURE: 61 MMHG | RESPIRATION RATE: 18 BRPM

## 2019-01-01 VITALS
TEMPERATURE: 99 F | SYSTOLIC BLOOD PRESSURE: 160 MMHG | OXYGEN SATURATION: 99 % | DIASTOLIC BLOOD PRESSURE: 54 MMHG | BODY MASS INDEX: 25 KG/M2 | HEART RATE: 84 BPM | RESPIRATION RATE: 22 BRPM | WEIGHT: 155 LBS

## 2019-01-01 VITALS
RESPIRATION RATE: 18 BRPM | HEART RATE: 65 BPM | BODY MASS INDEX: 26.18 KG/M2 | HEIGHT: 65 IN | SYSTOLIC BLOOD PRESSURE: 144 MMHG | TEMPERATURE: 98 F | DIASTOLIC BLOOD PRESSURE: 39 MMHG | OXYGEN SATURATION: 98 % | WEIGHT: 157.13 LBS

## 2019-01-01 VITALS
BODY MASS INDEX: 25 KG/M2 | WEIGHT: 154 LBS | OXYGEN SATURATION: 97 % | SYSTOLIC BLOOD PRESSURE: 144 MMHG | HEART RATE: 97 BPM | RESPIRATION RATE: 18 BRPM | DIASTOLIC BLOOD PRESSURE: 62 MMHG

## 2019-01-01 VITALS
HEART RATE: 83 BPM | WEIGHT: 150 LBS | BODY MASS INDEX: 24.11 KG/M2 | SYSTOLIC BLOOD PRESSURE: 135 MMHG | DIASTOLIC BLOOD PRESSURE: 50 MMHG | RESPIRATION RATE: 18 BRPM | TEMPERATURE: 99 F | HEIGHT: 66 IN | OXYGEN SATURATION: 92 %

## 2019-01-01 VITALS
WEIGHT: 147.38 LBS | RESPIRATION RATE: 20 BRPM | DIASTOLIC BLOOD PRESSURE: 58 MMHG | SYSTOLIC BLOOD PRESSURE: 140 MMHG | TEMPERATURE: 98 F | HEART RATE: 66 BPM | BODY MASS INDEX: 23.69 KG/M2 | OXYGEN SATURATION: 95 % | HEIGHT: 66 IN

## 2019-01-01 VITALS
SYSTOLIC BLOOD PRESSURE: 150 MMHG | HEIGHT: 66 IN | RESPIRATION RATE: 18 BRPM | DIASTOLIC BLOOD PRESSURE: 43 MMHG | WEIGHT: 146.19 LBS | BODY MASS INDEX: 23.49 KG/M2 | HEART RATE: 73 BPM | TEMPERATURE: 98 F | OXYGEN SATURATION: 88 %

## 2019-01-01 VITALS
HEART RATE: 122 BPM | HEIGHT: 65 IN | TEMPERATURE: 98 F | WEIGHT: 145 LBS | RESPIRATION RATE: 20 BRPM | OXYGEN SATURATION: 96 % | BODY MASS INDEX: 24.16 KG/M2 | DIASTOLIC BLOOD PRESSURE: 76 MMHG | SYSTOLIC BLOOD PRESSURE: 160 MMHG

## 2019-01-01 VITALS
RESPIRATION RATE: 18 BRPM | DIASTOLIC BLOOD PRESSURE: 53 MMHG | HEART RATE: 96 BPM | OXYGEN SATURATION: 95 % | SYSTOLIC BLOOD PRESSURE: 147 MMHG | TEMPERATURE: 98 F

## 2019-01-01 VITALS
HEIGHT: 66 IN | HEART RATE: 80 BPM | TEMPERATURE: 98 F | BODY MASS INDEX: 23.46 KG/M2 | RESPIRATION RATE: 22 BRPM | DIASTOLIC BLOOD PRESSURE: 36 MMHG | WEIGHT: 146 LBS | SYSTOLIC BLOOD PRESSURE: 114 MMHG

## 2019-01-01 VITALS
OXYGEN SATURATION: 87 % | RESPIRATION RATE: 20 BRPM | HEART RATE: 120 BPM | SYSTOLIC BLOOD PRESSURE: 108 MMHG | TEMPERATURE: 98 F | DIASTOLIC BLOOD PRESSURE: 46 MMHG

## 2019-01-01 VITALS
HEIGHT: 66 IN | BODY MASS INDEX: 25.23 KG/M2 | DIASTOLIC BLOOD PRESSURE: 52 MMHG | HEART RATE: 48 BPM | SYSTOLIC BLOOD PRESSURE: 145 MMHG | OXYGEN SATURATION: 93 % | WEIGHT: 157 LBS

## 2019-01-01 VITALS
OXYGEN SATURATION: 99 % | BODY MASS INDEX: 25 KG/M2 | WEIGHT: 155.13 LBS | SYSTOLIC BLOOD PRESSURE: 117 MMHG | HEART RATE: 73 BPM | DIASTOLIC BLOOD PRESSURE: 49 MMHG

## 2019-01-01 VITALS
TEMPERATURE: 98 F | HEART RATE: 78 BPM | RESPIRATION RATE: 18 BRPM | OXYGEN SATURATION: 95 % | DIASTOLIC BLOOD PRESSURE: 44 MMHG | SYSTOLIC BLOOD PRESSURE: 139 MMHG

## 2019-01-01 VITALS
BODY MASS INDEX: 24.52 KG/M2 | HEART RATE: 102 BPM | HEIGHT: 66 IN | DIASTOLIC BLOOD PRESSURE: 55 MMHG | SYSTOLIC BLOOD PRESSURE: 146 MMHG | WEIGHT: 152.56 LBS | OXYGEN SATURATION: 92 % | RESPIRATION RATE: 18 BRPM

## 2019-01-01 VITALS
BODY MASS INDEX: 24.16 KG/M2 | TEMPERATURE: 98 F | WEIGHT: 145 LBS | SYSTOLIC BLOOD PRESSURE: 141 MMHG | DIASTOLIC BLOOD PRESSURE: 57 MMHG | HEIGHT: 65 IN | OXYGEN SATURATION: 97 % | HEART RATE: 71 BPM | RESPIRATION RATE: 18 BRPM

## 2019-01-01 VITALS
RESPIRATION RATE: 18 BRPM | OXYGEN SATURATION: 96 % | HEART RATE: 94 BPM | DIASTOLIC BLOOD PRESSURE: 61 MMHG | TEMPERATURE: 98 F | SYSTOLIC BLOOD PRESSURE: 138 MMHG

## 2019-01-01 DIAGNOSIS — D64.9 ANEMIA, UNSPECIFIED TYPE: ICD-10-CM

## 2019-01-01 DIAGNOSIS — C34.90 PRIMARY MALIGNANT NEOPLASM OF LUNG METASTATIC TO OTHER SITE, UNSPECIFIED LATERALITY (HCC): Primary | ICD-10-CM

## 2019-01-01 DIAGNOSIS — Z51.11 CHEMOTHERAPY MANAGEMENT, ENCOUNTER FOR: ICD-10-CM

## 2019-01-01 DIAGNOSIS — R10.9 ABDOMINAL PAIN, ACUTE: ICD-10-CM

## 2019-01-01 DIAGNOSIS — G89.3 CANCER RELATED PAIN: ICD-10-CM

## 2019-01-01 DIAGNOSIS — Z45.2 ENCOUNTER FOR CARE RELATED TO VASCULAR ACCESS PORT: ICD-10-CM

## 2019-01-01 DIAGNOSIS — J96.21 ACUTE ON CHRONIC RESPIRATORY FAILURE WITH HYPOXEMIA (HCC): ICD-10-CM

## 2019-01-01 DIAGNOSIS — C34.90 PRIMARY MALIGNANT NEOPLASM OF LUNG METASTATIC TO OTHER SITE, UNSPECIFIED LATERALITY (HCC): ICD-10-CM

## 2019-01-01 DIAGNOSIS — I50.30 (HFPEF) HEART FAILURE WITH PRESERVED EJECTION FRACTION (HCC): Primary | ICD-10-CM

## 2019-01-01 DIAGNOSIS — J44.9 CHRONIC OBSTRUCTIVE PULMONARY DISEASE, UNSPECIFIED COPD TYPE (HCC): ICD-10-CM

## 2019-01-01 DIAGNOSIS — R09.02 HYPOXIA: ICD-10-CM

## 2019-01-01 DIAGNOSIS — C78.7 LIVER METASTASIS (HCC): ICD-10-CM

## 2019-01-01 DIAGNOSIS — D64.9 ANEMIA, UNSPECIFIED TYPE: Primary | ICD-10-CM

## 2019-01-01 DIAGNOSIS — R06.00 DYSPNEA, UNSPECIFIED TYPE: Primary | ICD-10-CM

## 2019-01-01 DIAGNOSIS — R73.9 HYPERGLYCEMIA: ICD-10-CM

## 2019-01-01 DIAGNOSIS — J44.9 CHRONIC OBSTRUCTIVE PULMONARY DISEASE, UNSPECIFIED COPD TYPE (HCC): Primary | ICD-10-CM

## 2019-01-01 DIAGNOSIS — C78.00 SECONDARY CARCINOMA OF LUNG, UNSPECIFIED LATERALITY (HCC): ICD-10-CM

## 2019-01-01 DIAGNOSIS — E61.1 IRON DEFICIENCY: ICD-10-CM

## 2019-01-01 DIAGNOSIS — K92.1 MELENA: Primary | ICD-10-CM

## 2019-01-01 DIAGNOSIS — I48.19 ATRIAL FIBRILLATION, PERSISTENT (HCC): ICD-10-CM

## 2019-01-01 DIAGNOSIS — I50.9 ACUTE ON CHRONIC CONGESTIVE HEART FAILURE, UNSPECIFIED HEART FAILURE TYPE (HCC): ICD-10-CM

## 2019-01-01 DIAGNOSIS — Z66 DNR (DO NOT RESUSCITATE): ICD-10-CM

## 2019-01-01 DIAGNOSIS — I50.9 ACUTE CONGESTIVE HEART FAILURE, UNSPECIFIED HEART FAILURE TYPE (HCC): ICD-10-CM

## 2019-01-01 DIAGNOSIS — N18.9 CHRONIC KIDNEY DISEASE, UNSPECIFIED CKD STAGE: ICD-10-CM

## 2019-01-01 DIAGNOSIS — Z71.89 ADVANCE CARE PLANNING: ICD-10-CM

## 2019-01-01 DIAGNOSIS — R35.0 URINARY FREQUENCY: ICD-10-CM

## 2019-01-01 DIAGNOSIS — J44.9 COPD (CHRONIC OBSTRUCTIVE PULMONARY DISEASE) (HCC): ICD-10-CM

## 2019-01-01 DIAGNOSIS — R35.0 URINARY FREQUENCY: Primary | ICD-10-CM

## 2019-01-01 DIAGNOSIS — R79.89 ELEVATED LACTIC ACID LEVEL: ICD-10-CM

## 2019-01-01 DIAGNOSIS — M54.16 LUMBAR RADICULOPATHY: Primary | ICD-10-CM

## 2019-01-01 DIAGNOSIS — C78.7 METASTASIS TO LIVER (HCC): ICD-10-CM

## 2019-01-01 DIAGNOSIS — E87.5 HYPERKALEMIA: ICD-10-CM

## 2019-01-01 DIAGNOSIS — Z71.89 GOALS OF CARE, COUNSELING/DISCUSSION: ICD-10-CM

## 2019-01-01 DIAGNOSIS — N18.9 CKD (CHRONIC KIDNEY DISEASE): ICD-10-CM

## 2019-01-01 DIAGNOSIS — J44.1 ACUTE EXACERBATION OF CHRONIC OBSTRUCTIVE PULMONARY DISEASE (COPD) (HCC): ICD-10-CM

## 2019-01-01 DIAGNOSIS — J44.1 COPD EXACERBATION (HCC): Primary | ICD-10-CM

## 2019-01-01 DIAGNOSIS — J44.1 COPD EXACERBATION (HCC): ICD-10-CM

## 2019-01-01 DIAGNOSIS — M54.9 BACK PAIN: ICD-10-CM

## 2019-01-01 DIAGNOSIS — C34.90 PRIMARY MALIGNANT NEOPLASM OF LUNG METASTATIC TO OTHER SITE (HCC): ICD-10-CM

## 2019-01-01 LAB
ADENOVIRUS F 40/41 PCR: NEGATIVE
ALBUMIN SERPL BCP-MCNC: 2.8 G/DL (ref 3.5–4.8)
ALBUMIN SERPL-MCNC: 2.5 G/DL (ref 3.4–5)
ALBUMIN SERPL-MCNC: 2.8 G/DL (ref 3.4–5)
ALBUMIN/GLOB SERPL: 0.8 {RATIO} (ref 1–2)
ALBUMIN/GLOB SERPL: 0.8 {RATIO} (ref 1–2)
ALP LIVER SERPL-CCNC: 74 U/L (ref 45–117)
ALP LIVER SERPL-CCNC: 95 U/L (ref 45–117)
ALP SERPL-CCNC: 48 U/L (ref 32–100)
ALT SERPL-CCNC: 18 U/L (ref 17–63)
ALT SERPL-CCNC: 30 U/L (ref 16–61)
ALT SERPL-CCNC: 39 U/L (ref 16–61)
ANION GAP SERPL CALC-SCNC: 13 MMOL/L (ref 0–18)
ANION GAP SERPL CALC-SCNC: 17 MMOL/L (ref 0–18)
ANION GAP SERPL CALC-SCNC: 5 MMOL/L (ref 0–18)
ANION GAP SERPL CALC-SCNC: 6 MMOL/L (ref 0–18)
ANION GAP SERPL CALC-SCNC: 7 MMOL/L (ref 0–18)
ANION GAP SERPL CALC-SCNC: 8 MMOL/L (ref 0–18)
ANION GAP SERPL CALC-SCNC: 8 MMOL/L (ref 0–18)
ANTIBODY SCREEN: NEGATIVE
AST SERPL-CCNC: 18 U/L (ref 15–37)
AST SERPL-CCNC: 20 U/L (ref 15–37)
AST SERPL-CCNC: 26 U/L (ref 15–41)
ASTROVIRUS PCR: NEGATIVE
BACTERIA UR QL AUTO: NEGATIVE /HPF
BASE EXCESS BLD CALC-SCNC: 5.1 MMOL/L (ref ?–2)
BASOPHILS # BLD AUTO: 0.02 X10(3) UL (ref 0–0.2)
BASOPHILS # BLD AUTO: 0.02 X10(3) UL (ref 0–0.2)
BASOPHILS # BLD: 0 K/UL (ref 0–0.2)
BASOPHILS # BLD: 0.1 K/UL (ref 0–0.2)
BASOPHILS NFR BLD AUTO: 0.2 %
BASOPHILS NFR BLD AUTO: 0.3 %
BASOPHILS NFR BLD: 0 %
BASOPHILS NFR BLD: 1 %
BILIRUB DIRECT SERPL-MCNC: 0.1 MG/DL (ref 0–0.2)
BILIRUB SERPL-MCNC: 0.2 MG/DL (ref 0.1–2)
BILIRUB SERPL-MCNC: 0.3 MG/DL (ref 0.1–2)
BILIRUB SERPL-MCNC: 0.7 MG/DL (ref 0.3–1.2)
BILIRUB UR QL: NEGATIVE
BLOOD TYPE BARCODE: 6200
BLOOD TYPE BARCODE: 6200
BUN BLD-MCNC: 43 MG/DL (ref 7–18)
BUN BLD-MCNC: 46 MG/DL (ref 7–18)
BUN SERPL-MCNC: 33 MG/DL (ref 8–20)
BUN SERPL-MCNC: 37 MG/DL (ref 8–20)
BUN SERPL-MCNC: 41 MG/DL (ref 8–20)
BUN SERPL-MCNC: 48 MG/DL (ref 8–20)
BUN SERPL-MCNC: 50 MG/DL (ref 8–20)
BUN/CREAT SERPL: 25.8 (ref 10–20)
BUN/CREAT SERPL: 27.2 (ref 10–20)
BUN/CREAT SERPL: 28.9 (ref 10–20)
BUN/CREAT SERPL: 32.4 (ref 10–20)
BUN/CREAT SERPL: 33.9 (ref 10–20)
BUN/CREAT SERPL: 36 (ref 10–20)
BUN/CREAT SERPL: 36.4 (ref 10–20)
C CAYETANENSIS DNA SPEC QL NAA+PROBE: NEGATIVE
C. DIFFICILE TOXIN A/B PCR: POSITIVE
CALCIUM BLD-MCNC: 8.7 MG/DL (ref 8.5–10.1)
CALCIUM BLD-MCNC: 8.9 MG/DL (ref 8.5–10.1)
CALCIUM SERPL-MCNC: 7.5 MG/DL (ref 8.5–10.5)
CALCIUM SERPL-MCNC: 7.7 MG/DL (ref 8.5–10.5)
CALCIUM SERPL-MCNC: 8.5 MG/DL (ref 8.5–10.5)
CALCIUM SERPL-MCNC: 8.8 MG/DL (ref 8.5–10.5)
CALCIUM SERPL-MCNC: 8.9 MG/DL (ref 8.5–10.5)
CAMPY SP DNA.DIARRHEA STL QL NAA+PROBE: NEGATIVE
CHLORIDE SERPL-SCNC: 101 MMOL/L (ref 95–110)
CHLORIDE SERPL-SCNC: 102 MMOL/L (ref 95–110)
CHLORIDE SERPL-SCNC: 103 MMOL/L (ref 95–110)
CHLORIDE SERPL-SCNC: 103 MMOL/L (ref 98–107)
CHLORIDE SERPL-SCNC: 105 MMOL/L (ref 98–107)
CHLORIDE SERPL-SCNC: 106 MMOL/L (ref 95–110)
CHLORIDE SERPL-SCNC: 94 MMOL/L (ref 95–110)
CHOLEST SERPL-MCNC: 110 MG/DL (ref 110–200)
CLARITY UR: CLEAR
CO2 SERPL-SCNC: 26 MMOL/L (ref 22–32)
CO2 SERPL-SCNC: 28 MMOL/L (ref 21–32)
CO2 SERPL-SCNC: 28 MMOL/L (ref 22–32)
CO2 SERPL-SCNC: 31 MMOL/L (ref 21–32)
CO2 SERPL-SCNC: 32 MMOL/L (ref 22–32)
CO2 SERPL-SCNC: 33 MMOL/L (ref 22–32)
CO2 SERPL-SCNC: 33 MMOL/L (ref 22–32)
COLOR UR: YELLOW
CREAT BLD-MCNC: 1.18 MG/DL (ref 0.7–1.3)
CREAT BLD-MCNC: 1.78 MG/DL (ref 0.7–1.3)
CREAT SERPL-MCNC: 1.14 MG/DL (ref 0.5–1.5)
CREAT SERPL-MCNC: 1.21 MG/DL (ref 0.5–1.5)
CREAT SERPL-MCNC: 1.36 MG/DL (ref 0.5–1.5)
CREAT SERPL-MCNC: 1.39 MG/DL (ref 0.5–1.5)
CREAT SERPL-MCNC: 1.48 MG/DL (ref 0.5–1.5)
CRYPTOSP DNA SPEC QL NAA+PROBE: NEGATIVE
DEPRECATED RDW RBC AUTO: 51.3 FL (ref 35.1–46.3)
DEPRECATED RDW RBC AUTO: 53.6 FL (ref 35.1–46.3)
DIGOXIN SERPL-MCNC: 2.6 NG/ML (ref 0.8–2.1)
EAEC PAA PLAS AGGR+AATA ST NAA+NON-PRB: NEGATIVE
EC STX1+STX2 + H7 FLIC SPEC NAA+PROBE: NEGATIVE
ENTAMOEBA HISTOLYTICA PCR: NEGATIVE
EOSINOPHIL # BLD AUTO: 0.02 X10(3) UL (ref 0–0.7)
EOSINOPHIL # BLD AUTO: 0.05 X10(3) UL (ref 0–0.7)
EOSINOPHIL # BLD: 0 K/UL (ref 0–0.7)
EOSINOPHIL # BLD: 0 K/UL (ref 0–0.7)
EOSINOPHIL # BLD: 0.1 K/UL (ref 0–0.7)
EOSINOPHIL NFR BLD AUTO: 0.3 %
EOSINOPHIL NFR BLD AUTO: 0.4 %
EOSINOPHIL NFR BLD: 0 %
EOSINOPHIL NFR BLD: 0 %
EOSINOPHIL NFR BLD: 1 %
EOSINOPHIL NFR BLD: 2 %
EPEC EAE GENE STL QL NAA+NON-PROBE: NEGATIVE
ERYTHROCYTE [DISTWIDTH] IN BLOOD BY AUTOMATED COUNT: 14.6 % (ref 11–15)
ERYTHROCYTE [DISTWIDTH] IN BLOOD BY AUTOMATED COUNT: 14.8 % (ref 11–15)
ERYTHROCYTE [DISTWIDTH] IN BLOOD BY AUTOMATED COUNT: 14.9 % (ref 11–15)
ERYTHROCYTE [DISTWIDTH] IN BLOOD BY AUTOMATED COUNT: 15.2 % (ref 11–15)
ERYTHROCYTE [DISTWIDTH] IN BLOOD BY AUTOMATED COUNT: 15.2 % (ref 11–15)
ERYTHROCYTE [DISTWIDTH] IN BLOOD BY AUTOMATED COUNT: 15.4 % (ref 11–15)
ERYTHROCYTE [DISTWIDTH] IN BLOOD BY AUTOMATED COUNT: 15.9 % (ref 11–15)
ERYTHROCYTE [DISTWIDTH] IN BLOOD BY AUTOMATED COUNT: 15.9 % (ref 11–15)
EST. AVERAGE GLUCOSE BLD GHB EST-MCNC: 183 MG/DL (ref 68–126)
ETEC LTA+ST1A+ST1B TOX ST NAA+NON-PROBE: NEGATIVE
GIARDIA LAMBLIA PCR: NEGATIVE
GLOBULIN PLAS-MCNC: 3.3 G/DL (ref 2.8–4.4)
GLOBULIN PLAS-MCNC: 3.4 G/DL (ref 2.8–4.4)
GLUCOSE BLD-MCNC: 186 MG/DL (ref 70–99)
GLUCOSE BLD-MCNC: 276 MG/DL (ref 70–99)
GLUCOSE BLDC GLUCOMTR-MCNC: 110 MG/DL (ref 70–99)
GLUCOSE BLDC GLUCOMTR-MCNC: 124 MG/DL (ref 70–99)
GLUCOSE BLDC GLUCOMTR-MCNC: 128 MG/DL (ref 70–99)
GLUCOSE BLDC GLUCOMTR-MCNC: 136 MG/DL (ref 70–99)
GLUCOSE BLDC GLUCOMTR-MCNC: 138 MG/DL (ref 70–99)
GLUCOSE BLDC GLUCOMTR-MCNC: 142 MG/DL (ref 70–99)
GLUCOSE BLDC GLUCOMTR-MCNC: 149 MG/DL (ref 70–99)
GLUCOSE BLDC GLUCOMTR-MCNC: 178 MG/DL (ref 70–99)
GLUCOSE BLDC GLUCOMTR-MCNC: 192 MG/DL (ref 70–99)
GLUCOSE BLDC GLUCOMTR-MCNC: 193 MG/DL (ref 70–99)
GLUCOSE BLDC GLUCOMTR-MCNC: 198 MG/DL (ref 70–99)
GLUCOSE BLDC GLUCOMTR-MCNC: 239 MG/DL (ref 70–99)
GLUCOSE BLDC GLUCOMTR-MCNC: 267 MG/DL (ref 70–99)
GLUCOSE BLDC GLUCOMTR-MCNC: 270 MG/DL (ref 70–99)
GLUCOSE BLDC GLUCOMTR-MCNC: 271 MG/DL (ref 70–99)
GLUCOSE BLDC GLUCOMTR-MCNC: 291 MG/DL (ref 70–99)
GLUCOSE BLDC GLUCOMTR-MCNC: 302 MG/DL (ref 70–99)
GLUCOSE BLDC GLUCOMTR-MCNC: 311 MG/DL (ref 70–99)
GLUCOSE BLDC GLUCOMTR-MCNC: 322 MG/DL (ref 70–99)
GLUCOSE BLDC GLUCOMTR-MCNC: 323 MG/DL (ref 70–99)
GLUCOSE BLDC GLUCOMTR-MCNC: 326 MG/DL (ref 70–99)
GLUCOSE BLDC GLUCOMTR-MCNC: 330 MG/DL (ref 70–99)
GLUCOSE BLDC GLUCOMTR-MCNC: 341 MG/DL (ref 70–99)
GLUCOSE BLDC GLUCOMTR-MCNC: 351 MG/DL (ref 70–99)
GLUCOSE BLDC GLUCOMTR-MCNC: 372 MG/DL (ref 70–99)
GLUCOSE BLDC GLUCOMTR-MCNC: 373 MG/DL (ref 70–99)
GLUCOSE BLDC GLUCOMTR-MCNC: 413 MG/DL (ref 70–99)
GLUCOSE BLDC GLUCOMTR-MCNC: 424 MG/DL (ref 70–99)
GLUCOSE BLDC GLUCOMTR-MCNC: 438 MG/DL (ref 70–99)
GLUCOSE BLDC GLUCOMTR-MCNC: 451 MG/DL (ref 70–99)
GLUCOSE BLDC GLUCOMTR-MCNC: 496 MG/DL (ref 70–99)
GLUCOSE BLDC GLUCOMTR-MCNC: 497 MG/DL (ref 70–99)
GLUCOSE BLDC GLUCOMTR-MCNC: 54 MG/DL (ref 70–99)
GLUCOSE BLDC GLUCOMTR-MCNC: >500 MG/DL (ref 70–99)
GLUCOSE SERPL-MCNC: 109 MG/DL (ref 70–99)
GLUCOSE SERPL-MCNC: 144 MG/DL (ref 70–99)
GLUCOSE SERPL-MCNC: 360 MG/DL (ref 70–99)
GLUCOSE SERPL-MCNC: 53 MG/DL (ref 70–99)
GLUCOSE SERPL-MCNC: 74 MG/DL (ref 70–99)
GLUCOSE UR-MCNC: >=500 MG/DL
HBA1C MFR BLD HPLC: 8 % (ref ?–5.7)
HCO3 BLDA-SCNC: 28.7 MEQ/L (ref 21–27)
HCT VFR BLD AUTO: 19.3 % (ref 41–52)
HCT VFR BLD AUTO: 19.3 % (ref 41–52)
HCT VFR BLD AUTO: 21.4 % (ref 41–52)
HCT VFR BLD AUTO: 25.3 % (ref 39–53)
HCT VFR BLD AUTO: 26.3 % (ref 41–52)
HCT VFR BLD AUTO: 26.5 % (ref 41–52)
HCT VFR BLD AUTO: 26.6 % (ref 41–52)
HCT VFR BLD AUTO: 27.8 % (ref 41–52)
HCT VFR BLD AUTO: 28.1 % (ref 41–52)
HCT VFR BLD AUTO: 28.4 % (ref 41–52)
HCT VFR BLD AUTO: 28.5 % (ref 39–53)
HDLC SERPL-MCNC: 40 MG/DL
HGB BLD-MCNC: 6.2 G/DL (ref 13.5–17.5)
HGB BLD-MCNC: 6.2 G/DL (ref 13.5–17.5)
HGB BLD-MCNC: 7 G/DL (ref 13.5–17.5)
HGB BLD-MCNC: 7.4 G/DL (ref 13–17.5)
HGB BLD-MCNC: 7.5 G/DL (ref 13.5–17.5)
HGB BLD-MCNC: 8.4 G/DL (ref 13.5–17.5)
HGB BLD-MCNC: 8.4 G/DL (ref 13–17.5)
HGB BLD-MCNC: 8.7 G/DL (ref 13.5–17.5)
HGB BLD-MCNC: 8.9 G/DL (ref 13.5–17.5)
HGB BLD-MCNC: 9 G/DL (ref 13.5–17.5)
HGB BLD-MCNC: 9.2 G/DL (ref 13.5–17.5)
HGB UR QL STRIP.AUTO: NEGATIVE
IMM GRANULOCYTES # BLD AUTO: 0.26 X10(3) UL (ref 0–1)
IMM GRANULOCYTES # BLD AUTO: 0.26 X10(3) UL (ref 0–1)
IMM GRANULOCYTES NFR BLD: 2.3 %
IMM GRANULOCYTES NFR BLD: 4.3 %
INR BLD: 1.1 (ref 0.9–1.2)
IRON SATN MFR SERPL: 14 % (ref 20–55)
IRON SATURATION: 14 % (ref 20–50)
IRON SERPL-MCNC: 42 MCG/DL (ref 45–182)
IRON SERPL-MCNC: 44 UG/DL (ref 65–175)
KETONES UR-MCNC: NEGATIVE MG/DL
LACTATE SERPL-SCNC: 0.8 MMOL/L (ref 0.5–2.2)
LACTATE SERPL-SCNC: 0.9 MMOL/L (ref 0.5–2.2)
LACTATE SERPL-SCNC: 2.5 MMOL/L (ref 0.5–2.2)
LACTATE SERPL-SCNC: 4.1 MMOL/L (ref 0.5–2.2)
LDLC SERPL CALC-MCNC: 38 MG/DL (ref 0–99)
LEUKOCYTE ESTERASE UR QL STRIP.AUTO: NEGATIVE
LIPASE SERPL-CCNC: 27 U/L (ref 22–51)
LYMPHOCYTES # BLD AUTO: 0.53 X10(3) UL (ref 1–4)
LYMPHOCYTES # BLD AUTO: 0.78 X10(3) UL (ref 1–4)
LYMPHOCYTES # BLD: 0.5 K/UL (ref 1–4)
LYMPHOCYTES # BLD: 0.7 K/UL (ref 1–4)
LYMPHOCYTES # BLD: 0.9 K/UL (ref 1–4)
LYMPHOCYTES # BLD: 1 K/UL (ref 1–4)
LYMPHOCYTES # BLD: 1.1 K/UL (ref 1–4)
LYMPHOCYTES # BLD: 1.2 K/UL (ref 1–4)
LYMPHOCYTES NFR BLD AUTO: 7 %
LYMPHOCYTES NFR BLD AUTO: 8.8 %
LYMPHOCYTES NFR BLD: 13 %
LYMPHOCYTES NFR BLD: 15 %
LYMPHOCYTES NFR BLD: 5 %
LYMPHOCYTES NFR BLD: 7 %
LYMPHOCYTES NFR BLD: 9 %
LYMPHOCYTES NFR BLD: 9 %
M PROTEIN MFR SERPL ELPH: 5.8 G/DL (ref 6.4–8.2)
M PROTEIN MFR SERPL ELPH: 6.2 G/DL (ref 6.4–8.2)
MCH RBC QN AUTO: 27.8 PG (ref 26–34)
MCH RBC QN AUTO: 28.3 PG (ref 26–34)
MCH RBC QN AUTO: 28.7 PG (ref 27–32)
MCH RBC QN AUTO: 28.8 PG (ref 27–32)
MCH RBC QN AUTO: 28.9 PG (ref 27–32)
MCH RBC QN AUTO: 29 PG (ref 27–32)
MCH RBC QN AUTO: 29.1 PG (ref 27–32)
MCH RBC QN AUTO: 29.2 PG (ref 27–32)
MCH RBC QN AUTO: 29.3 PG (ref 27–32)
MCH RBC QN AUTO: 29.4 PG (ref 27–32)
MCH RBC QN AUTO: 29.4 PG (ref 27–32)
MCHC RBC AUTO-ENTMCNC: 29.2 G/DL (ref 31–37)
MCHC RBC AUTO-ENTMCNC: 29.5 G/DL (ref 31–37)
MCHC RBC AUTO-ENTMCNC: 31.8 G/DL (ref 32–37)
MCHC RBC AUTO-ENTMCNC: 31.8 G/DL (ref 32–37)
MCHC RBC AUTO-ENTMCNC: 32.3 G/DL (ref 32–37)
MCHC RBC AUTO-ENTMCNC: 32.3 G/DL (ref 32–37)
MCHC RBC AUTO-ENTMCNC: 32.4 G/DL (ref 32–37)
MCHC RBC AUTO-ENTMCNC: 32.4 G/DL (ref 32–37)
MCHC RBC AUTO-ENTMCNC: 32.6 G/DL (ref 32–37)
MCHC RBC AUTO-ENTMCNC: 33 G/DL (ref 32–37)
MCHC RBC AUTO-ENTMCNC: 33.4 G/DL (ref 32–37)
MCV RBC AUTO: 87.1 FL (ref 80–100)
MCV RBC AUTO: 87.4 FL (ref 80–100)
MCV RBC AUTO: 88.6 FL (ref 80–100)
MCV RBC AUTO: 88.7 FL (ref 80–100)
MCV RBC AUTO: 89 FL (ref 80–100)
MCV RBC AUTO: 91 FL (ref 80–100)
MCV RBC AUTO: 91 FL (ref 80–100)
MCV RBC AUTO: 91.6 FL (ref 80–100)
MCV RBC AUTO: 92.2 FL (ref 80–100)
MCV RBC AUTO: 95.1 FL (ref 80–100)
MCV RBC AUTO: 96 FL (ref 80–100)
METAMYELOCYTES # BLD MANUAL: 0.2 K/UL
METAMYELOCYTES NFR BLD: 3 %
MODIFIED ALLEN TEST: POSITIVE
MONOCYTES # BLD AUTO: 0.47 X10(3) UL (ref 0.1–1)
MONOCYTES # BLD AUTO: 0.49 X10(3) UL (ref 0.1–1)
MONOCYTES # BLD: 0.2 K/UL (ref 0–1)
MONOCYTES # BLD: 0.3 K/UL (ref 0–1)
MONOCYTES # BLD: 0.3 K/UL (ref 0–1)
MONOCYTES # BLD: 0.4 K/UL (ref 0–1)
MONOCYTES # BLD: 0.4 K/UL (ref 0–1)
MONOCYTES # BLD: 0.5 K/UL (ref 0–1)
MONOCYTES # BLD: 0.8 K/UL (ref 0–1)
MONOCYTES # BLD: 0.8 K/UL (ref 0–1)
MONOCYTES NFR BLD AUTO: 4.2 %
MONOCYTES NFR BLD AUTO: 8.2 %
MONOCYTES NFR BLD: 10 %
MONOCYTES NFR BLD: 2 %
MONOCYTES NFR BLD: 3 %
MONOCYTES NFR BLD: 4 %
MONOCYTES NFR BLD: 4 %
MONOCYTES NFR BLD: 6 %
MONOCYTES NFR BLD: 7 %
MONOCYTES NFR BLD: 9 %
NEUTROPHILS # BLD AUTO: 4.68 X10 (3) UL (ref 1.5–7.7)
NEUTROPHILS # BLD AUTO: 4.68 X10(3) UL (ref 1.5–7.7)
NEUTROPHILS # BLD AUTO: 5.2 K/UL (ref 1.8–7.7)
NEUTROPHILS # BLD AUTO: 5.4 K/UL (ref 1.8–7.7)
NEUTROPHILS # BLD AUTO: 6 K/UL (ref 1.8–7.7)
NEUTROPHILS # BLD AUTO: 6.1 K/UL (ref 1.8–7.7)
NEUTROPHILS # BLD AUTO: 6.5 K/UL (ref 1.8–7.7)
NEUTROPHILS # BLD AUTO: 7.2 K/UL (ref 1.8–7.7)
NEUTROPHILS # BLD AUTO: 8.9 K/UL (ref 1.8–7.7)
NEUTROPHILS # BLD AUTO: 9.6 K/UL (ref 1.8–7.7)
NEUTROPHILS # BLD AUTO: 9.62 X10 (3) UL (ref 1.5–7.7)
NEUTROPHILS # BLD AUTO: 9.62 X10(3) UL (ref 1.5–7.7)
NEUTROPHILS NFR BLD AUTO: 78.1 %
NEUTROPHILS NFR BLD AUTO: 85.9 %
NEUTROPHILS NFR BLD: 69 %
NEUTROPHILS NFR BLD: 74 %
NEUTROPHILS NFR BLD: 76 %
NEUTROPHILS NFR BLD: 78 %
NEUTROPHILS NFR BLD: 81 %
NEUTROPHILS NFR BLD: 82 %
NEUTROPHILS NFR BLD: 88 %
NEUTROPHILS NFR BLD: 92 %
NEUTS BAND NFR BLD: 1 %
NEUTS BAND NFR BLD: 2 %
NEUTS BAND NFR BLD: 2 %
NEUTS BAND NFR BLD: 4 %
NEUTS BAND NFR BLD: 9 %
NITRITE UR QL STRIP.AUTO: NEGATIVE
NONHDLC SERPL-MCNC: 70 MG/DL
NOROVIRUS GI/GII PCR: NEGATIVE
NRBC BLD-RTO: 1 % (ref ?–1)
O2 CT BLD-SCNC: 11.6 VOL% (ref 15–23)
OSMOLALITY SERPL CALC.SUM OF ELEC: 308 MOSM/KG (ref 275–295)
OSMOLALITY SERPL CALC.SUM OF ELEC: 314 MOSM/KG (ref 275–295)
OSMOLALITY UR CALC.SUM OF ELEC: 301 MOSM/KG (ref 275–295)
OSMOLALITY UR CALC.SUM OF ELEC: 302 MOSM/KG (ref 275–295)
OSMOLALITY UR CALC.SUM OF ELEC: 304 MOSM/KG (ref 275–295)
OSMOLALITY UR CALC.SUM OF ELEC: 308 MOSM/KG (ref 275–295)
OSMOLALITY UR CALC.SUM OF ELEC: 315 MOSM/KG (ref 275–295)
P SHIGELLOIDES DNA STL QL NAA+PROBE: NEGATIVE
PCO2 BLDA: 51 MM HG (ref 35–45)
PH BLDA: 7.39 [PH] (ref 7.35–7.45)
PH UR: 5 [PH] (ref 5–8)
PLATELET # BLD AUTO: 108 K/UL (ref 140–400)
PLATELET # BLD AUTO: 109 K/UL (ref 140–400)
PLATELET # BLD AUTO: 111 K/UL (ref 140–400)
PLATELET # BLD AUTO: 114 10(3)UL (ref 150–450)
PLATELET # BLD AUTO: 114 K/UL (ref 140–400)
PLATELET # BLD AUTO: 114 K/UL (ref 140–400)
PLATELET # BLD AUTO: 118 K/UL (ref 140–400)
PLATELET # BLD AUTO: 125 K/UL (ref 140–400)
PLATELET # BLD AUTO: 150 10(3)UL (ref 150–450)
PLATELET # BLD AUTO: 182 K/UL (ref 140–400)
PLATELET # BLD AUTO: 94 K/UL (ref 140–400)
PMV BLD AUTO: 10 FL (ref 7.4–10.3)
PMV BLD AUTO: 10.2 FL (ref 7.4–10.3)
PMV BLD AUTO: 10.5 FL (ref 7.4–10.3)
PMV BLD AUTO: 10.6 FL (ref 7.4–10.3)
PMV BLD AUTO: 9.3 FL (ref 7.4–10.3)
PMV BLD AUTO: 9.5 FL (ref 7.4–10.3)
PMV BLD AUTO: 9.7 FL (ref 7.4–10.3)
PMV BLD AUTO: 9.8 FL (ref 7.4–10.3)
PMV BLD AUTO: 9.8 FL (ref 7.4–10.3)
PO2 BLDA: 46 MM HG (ref 80–100)
POTASSIUM SERPL-SCNC: 4.2 MMOL/L (ref 3.3–5.1)
POTASSIUM SERPL-SCNC: 4.2 MMOL/L (ref 3.5–5.1)
POTASSIUM SERPL-SCNC: 4.4 MMOL/L (ref 3.3–5.1)
POTASSIUM SERPL-SCNC: 4.6 MMOL/L (ref 3.3–5.1)
POTASSIUM SERPL-SCNC: 4.6 MMOL/L (ref 3.3–5.1)
POTASSIUM SERPL-SCNC: 4.9 MMOL/L (ref 3.3–5.1)
POTASSIUM SERPL-SCNC: 5.2 MMOL/L (ref 3.5–5.1)
PROT SERPL-MCNC: 5.5 G/DL (ref 5.9–8.4)
PROT UR-MCNC: 100 MG/DL
PROTHROMBIN TIME: 14.2 SECONDS (ref 11.8–14.5)
PUNCTURE CHARGE: YES
RBC # BLD AUTO: 2.12 M/UL (ref 4.5–5.9)
RBC # BLD AUTO: 2.12 M/UL (ref 4.5–5.9)
RBC # BLD AUTO: 2.41 M/UL (ref 4.5–5.9)
RBC # BLD AUTO: 2.66 X10(6)UL (ref 3.8–5.8)
RBC # BLD AUTO: 2.87 M/UL (ref 4.5–5.9)
RBC # BLD AUTO: 2.97 X10(6)UL (ref 3.8–5.8)
RBC # BLD AUTO: 3.03 M/UL (ref 4.5–5.9)
RBC # BLD AUTO: 3.05 M/UL (ref 4.5–5.9)
RBC # BLD AUTO: 3.05 M/UL (ref 4.5–5.9)
RBC # BLD AUTO: 3.14 M/UL (ref 4.5–5.9)
RBC # BLD AUTO: 3.21 M/UL (ref 4.5–5.9)
RBC #/AREA URNS AUTO: 1 /HPF
RH BLOOD TYPE: POSITIVE
ROTAVIRUS A PCR: NEGATIVE
SALMONELLA DNA SPEC QL NAA+PROBE: NEGATIVE
SAO2 % BLDA: 87 % (ref 94–100)
SAPOVIRUS PCR: NEGATIVE
SHIGELLA SP+EIEC IPAH ST NAA+NON-PROBE: NEGATIVE
SODIUM SERPL-SCNC: 139 MMOL/L (ref 136–144)
SODIUM SERPL-SCNC: 140 MMOL/L (ref 136–144)
SODIUM SERPL-SCNC: 141 MMOL/L (ref 136–144)
SODIUM SERPL-SCNC: 141 MMOL/L (ref 136–145)
SODIUM SERPL-SCNC: 141 MMOL/L (ref 136–145)
SODIUM SERPL-SCNC: 142 MMOL/L (ref 136–144)
SODIUM SERPL-SCNC: 145 MMOL/L (ref 136–144)
SP GR UR STRIP: 1.02 (ref 1–1.03)
TIBC SERPL-MCNC: 294 MCG/DL (ref 228–428)
TOTAL IRON BINDING CAPACITY: 313 UG/DL (ref 240–450)
TRANSFERRIN SERPL-MCNC: 210 MG/DL (ref 200–360)
TRANSFERRIN SERPL-MCNC: 223 MG/DL (ref 180–329)
TRIGL SERPL-MCNC: 161 MG/DL (ref 1–149)
TROPONIN I SERPL-MCNC: 0.08 NG/ML (ref ?–0.03)
TROPONIN I SERPL-MCNC: 0.09 NG/ML (ref ?–0.03)
TROPONIN I SERPL-MCNC: 0.11 NG/ML (ref ?–0.03)
TROPONIN I SERPL-MCNC: 0.11 NG/ML (ref ?–0.03)
TSI SER-ACNC: 1.16 MIU/ML (ref 0.36–3.74)
UROBILINOGEN UR STRIP-ACNC: <2
V CHOLERAE DNA SPEC QL NAA+PROBE: NEGATIVE
VARIANT LYMPHS NFR BLD MANUAL: 2 %
VIBRIO DNA SPEC NAA+PROBE: NEGATIVE
VIT C UR-MCNC: NEGATIVE MG/DL
WBC # BLD AUTO: 10.5 K/UL (ref 4–11)
WBC # BLD AUTO: 11.2 X10(3) UL (ref 4–11)
WBC # BLD AUTO: 6 X10(3) UL (ref 4–11)
WBC # BLD AUTO: 6.6 K/UL (ref 4–11)
WBC # BLD AUTO: 6.8 K/UL (ref 4–11)
WBC # BLD AUTO: 7.6 K/UL (ref 4–11)
WBC # BLD AUTO: 7.6 K/UL (ref 4–11)
WBC # BLD AUTO: 7.9 K/UL (ref 4–11)
WBC # BLD AUTO: 8.2 K/UL (ref 4–11)
WBC # BLD AUTO: 8.8 K/UL (ref 4–11)
WBC # BLD AUTO: 9.9 K/UL (ref 4–11)
WBC #/AREA URNS AUTO: 1 /HPF
YERSINIA DNA SPEC NAA+PROBE: NEGATIVE

## 2019-01-01 PROCEDURE — 99233 SBSQ HOSP IP/OBS HIGH 50: CPT | Performed by: HOSPITALIST

## 2019-01-01 PROCEDURE — 80053 COMPREHEN METABOLIC PANEL: CPT

## 2019-01-01 PROCEDURE — 84466 ASSAY OF TRANSFERRIN: CPT

## 2019-01-01 PROCEDURE — 93306 TTE W/DOPPLER COMPLETE: CPT | Performed by: HOSPITALIST

## 2019-01-01 PROCEDURE — 99232 SBSQ HOSP IP/OBS MODERATE 35: CPT | Performed by: INTERNAL MEDICINE

## 2019-01-01 PROCEDURE — 74176 CT ABD & PELVIS W/O CONTRAST: CPT | Performed by: EMERGENCY MEDICINE

## 2019-01-01 PROCEDURE — 85025 COMPLETE CBC W/AUTO DIFF WBC: CPT

## 2019-01-01 PROCEDURE — 96413 CHEMO IV INFUSION 1 HR: CPT

## 2019-01-01 PROCEDURE — 36415 COLL VENOUS BLD VENIPUNCTURE: CPT | Performed by: NURSE PRACTITIONER

## 2019-01-01 PROCEDURE — 0FB13ZX EXCISION OF RIGHT LOBE LIVER, PERCUTANEOUS APPROACH, DIAGNOSTIC: ICD-10-PCS | Performed by: RADIOLOGY

## 2019-01-01 PROCEDURE — 74176 CT ABD & PELVIS W/O CONTRAST: CPT | Performed by: HOSPITALIST

## 2019-01-01 PROCEDURE — 83540 ASSAY OF IRON: CPT

## 2019-01-01 PROCEDURE — 71045 X-RAY EXAM CHEST 1 VIEW: CPT | Performed by: INTERNAL MEDICINE

## 2019-01-01 PROCEDURE — 74176 CT ABD & PELVIS W/O CONTRAST: CPT | Performed by: INTERNAL MEDICINE

## 2019-01-01 PROCEDURE — 84443 ASSAY THYROID STIM HORMONE: CPT

## 2019-01-01 PROCEDURE — 99232 SBSQ HOSP IP/OBS MODERATE 35: CPT | Performed by: HOSPITALIST

## 2019-01-01 PROCEDURE — 70553 MRI BRAIN STEM W/O & W/DYE: CPT | Performed by: INTERNAL MEDICINE

## 2019-01-01 PROCEDURE — 72072 X-RAY EXAM THORAC SPINE 3VWS: CPT | Performed by: EMERGENCY MEDICINE

## 2019-01-01 PROCEDURE — 02H633Z INSERTION OF INFUSION DEVICE INTO RIGHT ATRIUM, PERCUTANEOUS APPROACH: ICD-10-PCS | Performed by: RADIOLOGY

## 2019-01-01 PROCEDURE — 96367 TX/PROPH/DG ADDL SEQ IV INF: CPT

## 2019-01-01 PROCEDURE — 99215 OFFICE O/P EST HI 40 MIN: CPT | Performed by: PHYSICIAN ASSISTANT

## 2019-01-01 PROCEDURE — 99215 OFFICE O/P EST HI 40 MIN: CPT | Performed by: INTERNAL MEDICINE

## 2019-01-01 PROCEDURE — 99231 SBSQ HOSP IP/OBS SF/LOW 25: CPT | Performed by: REGISTERED NURSE

## 2019-01-01 PROCEDURE — 99239 HOSP IP/OBS DSCHRG MGMT >30: CPT | Performed by: HOSPITALIST

## 2019-01-01 PROCEDURE — 36600 WITHDRAWAL OF ARTERIAL BLOOD: CPT | Performed by: INTERNAL MEDICINE

## 2019-01-01 PROCEDURE — 36591 DRAW BLOOD OFF VENOUS DEVICE: CPT

## 2019-01-01 PROCEDURE — 99223 1ST HOSP IP/OBS HIGH 75: CPT | Performed by: INTERNAL MEDICINE

## 2019-01-01 PROCEDURE — 99223 1ST HOSP IP/OBS HIGH 75: CPT | Performed by: HOSPITALIST

## 2019-01-01 PROCEDURE — G0463 HOSPITAL OUTPT CLINIC VISIT: HCPCS | Performed by: INTERNAL MEDICINE

## 2019-01-01 PROCEDURE — 99213 OFFICE O/P EST LOW 20 MIN: CPT | Performed by: INTERNAL MEDICINE

## 2019-01-01 PROCEDURE — 78582 LUNG VENTILAT&PERFUS IMAGING: CPT | Performed by: INTERNAL MEDICINE

## 2019-01-01 PROCEDURE — 96375 TX/PRO/DX INJ NEW DRUG ADDON: CPT

## 2019-01-01 PROCEDURE — 71250 CT THORAX DX C-: CPT | Performed by: INTERNAL MEDICINE

## 2019-01-01 PROCEDURE — 30233N1 TRANSFUSION OF NONAUTOLOGOUS RED BLOOD CELLS INTO PERIPHERAL VEIN, PERCUTANEOUS APPROACH: ICD-10-PCS | Performed by: INTERNAL MEDICINE

## 2019-01-01 PROCEDURE — 36561 INSERT TUNNELED CV CATH: CPT

## 2019-01-01 PROCEDURE — 93970 EXTREMITY STUDY: CPT | Performed by: EMERGENCY MEDICINE

## 2019-01-01 PROCEDURE — 71045 X-RAY EXAM CHEST 1 VIEW: CPT | Performed by: EMERGENCY MEDICINE

## 2019-01-01 PROCEDURE — 99222 1ST HOSP IP/OBS MODERATE 55: CPT | Performed by: INTERNAL MEDICINE

## 2019-01-01 PROCEDURE — 87086 URINE CULTURE/COLONY COUNT: CPT

## 2019-01-01 PROCEDURE — 77001 FLUOROGUIDE FOR VEIN DEVICE: CPT

## 2019-01-01 PROCEDURE — 96417 CHEMO IV INFUS EACH ADDL SEQ: CPT

## 2019-01-01 PROCEDURE — 99233 SBSQ HOSP IP/OBS HIGH 50: CPT | Performed by: INTERNAL MEDICINE

## 2019-01-01 PROCEDURE — 99284 EMERGENCY DEPT VISIT MOD MDM: CPT

## 2019-01-01 PROCEDURE — 74018 RADEX ABDOMEN 1 VIEW: CPT | Performed by: EMERGENCY MEDICINE

## 2019-01-01 PROCEDURE — 93308 TTE F-UP OR LMTD: CPT | Performed by: NURSE PRACTITIONER

## 2019-01-01 PROCEDURE — 82962 GLUCOSE BLOOD TEST: CPT

## 2019-01-01 PROCEDURE — 96411 CHEMO IV PUSH ADDL DRUG: CPT

## 2019-01-01 PROCEDURE — 78306 BONE IMAGING WHOLE BODY: CPT | Performed by: INTERNAL MEDICINE

## 2019-01-01 PROCEDURE — 99214 OFFICE O/P EST MOD 30 MIN: CPT | Performed by: NURSE PRACTITIONER

## 2019-01-01 PROCEDURE — 99214 OFFICE O/P EST MOD 30 MIN: CPT | Performed by: INTERNAL MEDICINE

## 2019-01-01 PROCEDURE — A9575 INJ GADOTERATE MEGLUMI 0.1ML: HCPCS | Performed by: INTERNAL MEDICINE

## 2019-01-01 PROCEDURE — 93306 TTE W/DOPPLER COMPLETE: CPT | Performed by: NURSE PRACTITIONER

## 2019-01-01 PROCEDURE — 80048 BASIC METABOLIC PNL TOTAL CA: CPT | Performed by: NURSE PRACTITIONER

## 2019-01-01 PROCEDURE — 99223 1ST HOSP IP/OBS HIGH 75: CPT | Performed by: REGISTERED NURSE

## 2019-01-01 PROCEDURE — 72100 X-RAY EXAM L-S SPINE 2/3 VWS: CPT | Performed by: EMERGENCY MEDICINE

## 2019-01-01 PROCEDURE — 85007 BL SMEAR W/DIFF WBC COUNT: CPT

## 2019-01-01 PROCEDURE — 96372 THER/PROPH/DIAG INJ SC/IM: CPT

## 2019-01-01 PROCEDURE — 96361 HYDRATE IV INFUSION ADD-ON: CPT

## 2019-01-01 PROCEDURE — 0JH63XZ INSERTION OF TUNNELED VASCULAR ACCESS DEVICE INTO CHEST SUBCUTANEOUS TISSUE AND FASCIA, PERCUTANEOUS APPROACH: ICD-10-PCS | Performed by: RADIOLOGY

## 2019-01-01 PROCEDURE — 85025 COMPLETE CBC W/AUTO DIFF WBC: CPT | Performed by: NURSE PRACTITIONER

## 2019-01-01 PROCEDURE — 99212 OFFICE O/P EST SF 10 MIN: CPT | Performed by: NURSE PRACTITIONER

## 2019-01-01 PROCEDURE — 82805 BLOOD GASES W/O2 SATURATION: CPT | Performed by: INTERNAL MEDICINE

## 2019-01-01 PROCEDURE — 85027 COMPLETE CBC AUTOMATED: CPT

## 2019-01-01 PROCEDURE — 99204 OFFICE O/P NEW MOD 45 MIN: CPT | Performed by: NURSE PRACTITIONER

## 2019-01-01 PROCEDURE — 81001 URINALYSIS AUTO W/SCOPE: CPT

## 2019-01-01 PROCEDURE — 30233N1 TRANSFUSION OF NONAUTOLOGOUS RED BLOOD CELLS INTO PERIPHERAL VEIN, PERCUTANEOUS APPROACH: ICD-10-PCS | Performed by: EMERGENCY MEDICINE

## 2019-01-01 PROCEDURE — 74177 CT ABD & PELVIS W/CONTRAST: CPT | Performed by: EMERGENCY MEDICINE

## 2019-01-01 PROCEDURE — 71045 X-RAY EXAM CHEST 1 VIEW: CPT

## 2019-01-01 PROCEDURE — 0DB68ZX EXCISION OF STOMACH, VIA NATURAL OR ARTIFICIAL OPENING ENDOSCOPIC, DIAGNOSTIC: ICD-10-PCS | Performed by: INTERNAL MEDICINE

## 2019-01-01 PROCEDURE — 94010 BREATHING CAPACITY TEST: CPT | Performed by: INTERNAL MEDICINE

## 2019-01-01 PROCEDURE — 93970 EXTREMITY STUDY: CPT | Performed by: INTERNAL MEDICINE

## 2019-01-01 RX ORDER — HYDROCODONE BITARTRATE AND ACETAMINOPHEN 5; 325 MG/1; MG/1
2 TABLET ORAL AS NEEDED
Status: DISCONTINUED | OUTPATIENT
Start: 2019-01-01 | End: 2019-01-01 | Stop reason: HOSPADM

## 2019-01-01 RX ORDER — METHYLPREDNISOLONE SODIUM SUCCINATE 40 MG/ML
20 INJECTION, POWDER, LYOPHILIZED, FOR SOLUTION INTRAMUSCULAR; INTRAVENOUS EVERY 12 HOURS
Status: DISCONTINUED | OUTPATIENT
Start: 2019-01-01 | End: 2019-01-01

## 2019-01-01 RX ORDER — ALBUTEROL SULFATE 2.5 MG/3ML
10 SOLUTION RESPIRATORY (INHALATION) CONTINUOUS
Status: DISCONTINUED | OUTPATIENT
Start: 2019-01-01 | End: 2019-01-01

## 2019-01-01 RX ORDER — ATORVASTATIN CALCIUM 40 MG/1
40 TABLET, FILM COATED ORAL NIGHTLY
Status: DISCONTINUED | OUTPATIENT
Start: 2019-01-01 | End: 2019-01-01

## 2019-01-01 RX ORDER — FOLIC ACID 1 MG/1
1 TABLET ORAL DAILY
Qty: 90 TABLET | Refills: 2 | Status: ON HOLD | OUTPATIENT
Start: 2019-01-01 | End: 2019-01-01

## 2019-01-01 RX ORDER — ATORVASTATIN CALCIUM 40 MG/1
80 TABLET, FILM COATED ORAL NIGHTLY
Status: DISCONTINUED | OUTPATIENT
Start: 2019-01-01 | End: 2019-01-01

## 2019-01-01 RX ORDER — 0.9 % SODIUM CHLORIDE 0.9 %
VIAL (ML) INJECTION
Status: DISCONTINUED
Start: 2019-01-01 | End: 2019-01-01

## 2019-01-01 RX ORDER — PANTOPRAZOLE SODIUM 40 MG/1
40 TABLET, DELAYED RELEASE ORAL
Status: DISCONTINUED | OUTPATIENT
Start: 2019-01-01 | End: 2019-01-01

## 2019-01-01 RX ORDER — ALBUTEROL SULFATE 2.5 MG/3ML
2.5 SOLUTION RESPIRATORY (INHALATION)
Status: DISCONTINUED | OUTPATIENT
Start: 2019-01-01 | End: 2019-01-01

## 2019-01-01 RX ORDER — PREDNISONE 20 MG/1
20 TABLET ORAL
Status: DISCONTINUED | OUTPATIENT
Start: 2019-01-01 | End: 2019-01-01

## 2019-01-01 RX ORDER — SODIUM CHLORIDE 0.9 % (FLUSH) 0.9 %
10 SYRINGE (ML) INJECTION AS NEEDED
Status: DISCONTINUED | OUTPATIENT
Start: 2019-01-01 | End: 2019-01-01 | Stop reason: ALTCHOICE

## 2019-01-01 RX ORDER — LORAZEPAM 1 MG/1
TABLET ORAL EVERY 4 HOURS PRN
Status: CANCELLED | OUTPATIENT
Start: 2019-01-01

## 2019-01-01 RX ORDER — ZOLPIDEM TARTRATE 5 MG/1
5 TABLET ORAL NIGHTLY PRN
Status: DISCONTINUED | OUTPATIENT
Start: 2019-01-01 | End: 2019-01-01

## 2019-01-01 RX ORDER — FUROSEMIDE 10 MG/ML
40 INJECTION INTRAMUSCULAR; INTRAVENOUS ONCE
Status: COMPLETED | OUTPATIENT
Start: 2019-01-01 | End: 2019-01-01

## 2019-01-01 RX ORDER — SODIUM CHLORIDE 9 MG/ML
INJECTION, SOLUTION INTRAVENOUS ONCE
Status: COMPLETED | OUTPATIENT
Start: 2019-01-01 | End: 2019-01-01

## 2019-01-01 RX ORDER — PREDNISONE 10 MG/1
TABLET ORAL
Qty: 20 TABLET | Refills: 0 | Status: SHIPPED | OUTPATIENT
Start: 2019-01-01 | End: 2019-06-22

## 2019-01-01 RX ORDER — LORAZEPAM 2 MG/ML
0.5 INJECTION INTRAMUSCULAR EVERY 4 HOURS PRN
Status: DISCONTINUED | OUTPATIENT
Start: 2019-01-01 | End: 2019-06-18

## 2019-01-01 RX ORDER — BUMETANIDE 1 MG/1
1 TABLET ORAL ONCE
Status: COMPLETED | OUTPATIENT
Start: 2019-01-01 | End: 2019-01-01

## 2019-01-01 RX ORDER — LIDOCAINE HYDROCHLORIDE 20 MG/ML
INJECTION, SOLUTION EPIDURAL; INFILTRATION; INTRACAUDAL; PERINEURAL
Status: COMPLETED
Start: 2019-01-01 | End: 2019-01-01

## 2019-01-01 RX ORDER — HYDROMORPHONE HYDROCHLORIDE 1 MG/ML
0.2 INJECTION, SOLUTION INTRAMUSCULAR; INTRAVENOUS; SUBCUTANEOUS EVERY 2 HOUR PRN
Status: DISCONTINUED | OUTPATIENT
Start: 2019-01-01 | End: 2019-06-18

## 2019-01-01 RX ORDER — HYDROMORPHONE HYDROCHLORIDE 1 MG/ML
0.8 INJECTION, SOLUTION INTRAMUSCULAR; INTRAVENOUS; SUBCUTANEOUS EVERY 2 HOUR PRN
Status: DISCONTINUED | OUTPATIENT
Start: 2019-01-01 | End: 2019-06-18

## 2019-01-01 RX ORDER — LEVOTHYROXINE SODIUM 0.05 MG/1
50 TABLET ORAL
Status: DISCONTINUED | OUTPATIENT
Start: 2019-01-01 | End: 2019-01-01

## 2019-01-01 RX ORDER — ATROPINE SULFATE 10 MG/ML
2 SOLUTION/ DROPS OPHTHALMIC EVERY 2 HOUR PRN
Status: DISCONTINUED | OUTPATIENT
Start: 2019-01-01 | End: 2019-06-18

## 2019-01-01 RX ORDER — ALBUTEROL SULFATE 90 UG/1
2 AEROSOL, METERED RESPIRATORY (INHALATION) EVERY 4 HOURS PRN
Status: DISCONTINUED | OUTPATIENT
Start: 2019-01-01 | End: 2019-01-01

## 2019-01-01 RX ORDER — MORPHINE SULFATE 4 MG/ML
4 INJECTION, SOLUTION INTRAMUSCULAR; INTRAVENOUS EVERY 10 MIN PRN
Status: DISCONTINUED | OUTPATIENT
Start: 2019-01-01 | End: 2019-01-01 | Stop reason: HOSPADM

## 2019-01-01 RX ORDER — LISINOPRIL 5 MG/1
5 TABLET ORAL DAILY
Status: DISCONTINUED | OUTPATIENT
Start: 2019-01-01 | End: 2019-01-01

## 2019-01-01 RX ORDER — ONDANSETRON 2 MG/ML
4 INJECTION INTRAMUSCULAR; INTRAVENOUS EVERY 6 HOURS PRN
Status: DISCONTINUED | OUTPATIENT
Start: 2019-01-01 | End: 2019-01-01

## 2019-01-01 RX ORDER — GLYCOPYRROLATE 0.2 MG/ML
0.4 INJECTION, SOLUTION INTRAMUSCULAR; INTRAVENOUS
Status: DISCONTINUED | OUTPATIENT
Start: 2019-01-01 | End: 2019-06-18

## 2019-01-01 RX ORDER — HYDROCODONE BITARTRATE AND ACETAMINOPHEN 5; 325 MG/1; MG/1
1 TABLET ORAL AS NEEDED
Status: DISCONTINUED | OUTPATIENT
Start: 2019-01-01 | End: 2019-01-01 | Stop reason: HOSPADM

## 2019-01-01 RX ORDER — SODIUM CHLORIDE 9 MG/ML
INJECTION, SOLUTION INTRAVENOUS CONTINUOUS
Status: CANCELLED | OUTPATIENT
Start: 2019-01-01 | End: 2019-01-01

## 2019-01-01 RX ORDER — DEXTROSE MONOHYDRATE 25 G/50ML
50 INJECTION, SOLUTION INTRAVENOUS AS NEEDED
Status: DISCONTINUED | OUTPATIENT
Start: 2019-01-01 | End: 2019-01-01

## 2019-01-01 RX ORDER — SODIUM CHLORIDE 9 MG/ML
INJECTION, SOLUTION INTRAVENOUS CONTINUOUS
Status: DISCONTINUED | OUTPATIENT
Start: 2019-01-01 | End: 2019-01-01

## 2019-01-01 RX ORDER — SODIUM CHLORIDE 9 MG/ML
INJECTION, SOLUTION INTRAVENOUS
Status: COMPLETED
Start: 2019-01-01 | End: 2019-01-01

## 2019-01-01 RX ORDER — INSULIN ASPART 100 [IU]/ML
10 INJECTION, SOLUTION INTRAVENOUS; SUBCUTANEOUS ONCE
Status: DISCONTINUED | OUTPATIENT
Start: 2019-01-01 | End: 2019-01-01

## 2019-01-01 RX ORDER — IPRATROPIUM BROMIDE AND ALBUTEROL SULFATE 2.5; .5 MG/3ML; MG/3ML
3 SOLUTION RESPIRATORY (INHALATION) EVERY 6 HOURS PRN
Status: DISCONTINUED | OUTPATIENT
Start: 2019-01-01 | End: 2019-01-01

## 2019-01-01 RX ORDER — SODIUM CHLORIDE 0.9 % (FLUSH) 0.9 %
10 SYRINGE (ML) INJECTION AS NEEDED
Status: DISCONTINUED | OUTPATIENT
Start: 2019-01-01 | End: 2019-01-01

## 2019-01-01 RX ORDER — SODIUM CHLORIDE 9 MG/ML
INJECTION, SOLUTION INTRAVENOUS ONCE
Status: DISCONTINUED | OUTPATIENT
Start: 2019-01-01 | End: 2019-01-01

## 2019-01-01 RX ORDER — BUMETANIDE 0.25 MG/ML
1 INJECTION, SOLUTION INTRAMUSCULAR; INTRAVENOUS
Status: DISCONTINUED | OUTPATIENT
Start: 2019-01-01 | End: 2019-01-01

## 2019-01-01 RX ORDER — PROCHLORPERAZINE MALEATE 10 MG
10 TABLET ORAL EVERY 6 HOURS PRN
Status: DISCONTINUED | OUTPATIENT
Start: 2019-01-01 | End: 2019-01-01

## 2019-01-01 RX ORDER — DILTIAZEM HYDROCHLORIDE 180 MG/1
180 CAPSULE, COATED, EXTENDED RELEASE ORAL DAILY
Qty: 30 CAPSULE | Refills: 3 | Status: SHIPPED | OUTPATIENT
Start: 2019-01-01

## 2019-01-01 RX ORDER — INSULIN ASPART 100 [IU]/ML
5 INJECTION, SOLUTION INTRAVENOUS; SUBCUTANEOUS ONCE
Status: COMPLETED | OUTPATIENT
Start: 2019-01-01 | End: 2019-01-01

## 2019-01-01 RX ORDER — HYDROMORPHONE HYDROCHLORIDE 1 MG/ML
0.4 INJECTION, SOLUTION INTRAMUSCULAR; INTRAVENOUS; SUBCUTANEOUS EVERY 2 HOUR PRN
Status: DISCONTINUED | OUTPATIENT
Start: 2019-01-01 | End: 2019-06-18

## 2019-01-01 RX ORDER — METOPROLOL TARTRATE 50 MG/1
50 TABLET, FILM COATED ORAL
Status: DISCONTINUED | OUTPATIENT
Start: 2019-01-01 | End: 2019-01-01

## 2019-01-01 RX ORDER — BISACODYL 10 MG
10 SUPPOSITORY, RECTAL RECTAL
Status: DISCONTINUED | OUTPATIENT
Start: 2019-01-01 | End: 2019-06-18

## 2019-01-01 RX ORDER — SODIUM CHLORIDE, SODIUM LACTATE, POTASSIUM CHLORIDE, CALCIUM CHLORIDE 600; 310; 30; 20 MG/100ML; MG/100ML; MG/100ML; MG/100ML
INJECTION, SOLUTION INTRAVENOUS CONTINUOUS PRN
Status: DISCONTINUED | OUTPATIENT
Start: 2019-01-01 | End: 2019-01-01 | Stop reason: SURG

## 2019-01-01 RX ORDER — HYDRALAZINE HYDROCHLORIDE 20 MG/ML
10 INJECTION INTRAMUSCULAR; INTRAVENOUS EVERY 6 HOURS PRN
Status: DISCONTINUED | OUTPATIENT
Start: 2019-01-01 | End: 2019-01-01

## 2019-01-01 RX ORDER — DIGOXIN 250 MCG
0.25 TABLET ORAL DAILY
Status: ON HOLD | COMMUNITY
End: 2019-01-01

## 2019-01-01 RX ORDER — HEPARIN SODIUM (PORCINE) LOCK FLUSH IV SOLN 100 UNIT/ML 100 UNIT/ML
5 SOLUTION INTRAVENOUS ONCE
Status: CANCELLED | OUTPATIENT
Start: 2019-01-01

## 2019-01-01 RX ORDER — LORAZEPAM 2 MG/ML
INJECTION INTRAMUSCULAR EVERY 4 HOURS PRN
Status: CANCELLED | OUTPATIENT
Start: 2019-01-01

## 2019-01-01 RX ORDER — CEFAZOLIN SODIUM/WATER 2 G/20 ML
SYRINGE (ML) INTRAVENOUS
Status: COMPLETED
Start: 2019-01-01 | End: 2019-01-01

## 2019-01-01 RX ORDER — NALOXONE HYDROCHLORIDE 0.4 MG/ML
80 INJECTION, SOLUTION INTRAMUSCULAR; INTRAVENOUS; SUBCUTANEOUS AS NEEDED
Status: DISCONTINUED | OUTPATIENT
Start: 2019-01-01 | End: 2019-01-01 | Stop reason: HOSPADM

## 2019-01-01 RX ORDER — METOCLOPRAMIDE HYDROCHLORIDE 5 MG/ML
10 INJECTION INTRAMUSCULAR; INTRAVENOUS EVERY 6 HOURS PRN
Status: CANCELLED | OUTPATIENT
Start: 2019-01-01

## 2019-01-01 RX ORDER — 0.9 % SODIUM CHLORIDE 0.9 %
10 VIAL (ML) INJECTION ONCE
Status: CANCELLED | OUTPATIENT
Start: 2019-01-01

## 2019-01-01 RX ORDER — MORPHINE SULFATE 4 MG/ML
2 INJECTION, SOLUTION INTRAMUSCULAR; INTRAVENOUS ONCE
Status: COMPLETED | OUTPATIENT
Start: 2019-01-01 | End: 2019-01-01

## 2019-01-01 RX ORDER — HYDROCODONE BITARTRATE AND ACETAMINOPHEN 5; 325 MG/1; MG/1
1 TABLET ORAL ONCE
Status: COMPLETED | OUTPATIENT
Start: 2019-01-01 | End: 2019-01-01

## 2019-01-01 RX ORDER — GUAIFENESIN 100 MG/5ML
200 SOLUTION ORAL EVERY 4 HOURS PRN
Status: DISCONTINUED | OUTPATIENT
Start: 2019-01-01 | End: 2019-01-01

## 2019-01-01 RX ORDER — MORPHINE SULFATE 20 MG/ML
5 SOLUTION ORAL EVERY 4 HOURS PRN
Status: DISCONTINUED | OUTPATIENT
Start: 2019-01-01 | End: 2019-01-01

## 2019-01-01 RX ORDER — SENNA AND DOCUSATE SODIUM 50; 8.6 MG/1; MG/1
2 TABLET, FILM COATED ORAL
Qty: 30 TABLET | Refills: 0 | Status: ON HOLD | OUTPATIENT
Start: 2019-01-01 | End: 2019-01-01

## 2019-01-01 RX ORDER — TRAMADOL HYDROCHLORIDE 50 MG/1
50 TABLET ORAL EVERY 6 HOURS PRN
Status: DISCONTINUED | OUTPATIENT
Start: 2019-01-01 | End: 2019-01-01

## 2019-01-01 RX ORDER — BUMETANIDE 2 MG/1
2 TABLET ORAL EVERY OTHER DAY
Qty: 30 TABLET | Refills: 0 | Status: ON HOLD
Start: 2019-01-01 | End: 2019-01-01

## 2019-01-01 RX ORDER — SODIUM CHLORIDE, SODIUM LACTATE, POTASSIUM CHLORIDE, CALCIUM CHLORIDE 600; 310; 30; 20 MG/100ML; MG/100ML; MG/100ML; MG/100ML
INJECTION, SOLUTION INTRAVENOUS CONTINUOUS
Status: DISCONTINUED | OUTPATIENT
Start: 2019-01-01 | End: 2019-01-01 | Stop reason: HOSPADM

## 2019-01-01 RX ORDER — DEXAMETHASONE 4 MG/1
TABLET ORAL
Qty: 12 TABLET | Refills: 2 | Status: ON HOLD | OUTPATIENT
Start: 2019-01-01 | End: 2019-01-01

## 2019-01-01 RX ORDER — ALBUTEROL SULFATE 2.5 MG/3ML
2.5 SOLUTION RESPIRATORY (INHALATION) 4 TIMES DAILY PRN
Status: DISCONTINUED | OUTPATIENT
Start: 2019-01-01 | End: 2019-01-01

## 2019-01-01 RX ORDER — FOLIC ACID 1 MG/1
1 TABLET ORAL DAILY
Status: DISCONTINUED | OUTPATIENT
Start: 2019-01-01 | End: 2019-01-01

## 2019-01-01 RX ORDER — SODIUM CHLORIDE 0.9 % (FLUSH) 0.9 %
3 SYRINGE (ML) INJECTION AS NEEDED
Status: DISCONTINUED | OUTPATIENT
Start: 2019-01-01 | End: 2019-01-01

## 2019-01-01 RX ORDER — PREDNISONE 20 MG/1
20 TABLET ORAL NIGHTLY
Status: DISCONTINUED | OUTPATIENT
Start: 2019-01-01 | End: 2019-01-01

## 2019-01-01 RX ORDER — HEPARIN SODIUM (PORCINE) LOCK FLUSH IV SOLN 100 UNIT/ML 100 UNIT/ML
SOLUTION INTRAVENOUS
Status: COMPLETED
Start: 2019-01-01 | End: 2019-01-01

## 2019-01-01 RX ORDER — CEFAZOLIN SODIUM/WATER 2 G/20 ML
SYRINGE (ML) INTRAVENOUS AS NEEDED
Status: DISCONTINUED | OUTPATIENT
Start: 2019-01-01 | End: 2019-01-01 | Stop reason: SURG

## 2019-01-01 RX ORDER — HYDROMORPHONE HYDROCHLORIDE 1 MG/ML
0.4 INJECTION, SOLUTION INTRAMUSCULAR; INTRAVENOUS; SUBCUTANEOUS EVERY 2 HOUR PRN
Status: CANCELLED | OUTPATIENT
Start: 2019-01-01

## 2019-01-01 RX ORDER — IPRATROPIUM BROMIDE AND ALBUTEROL SULFATE 2.5; .5 MG/3ML; MG/3ML
3 SOLUTION RESPIRATORY (INHALATION)
Status: DISCONTINUED | OUTPATIENT
Start: 2019-01-01 | End: 2019-01-01

## 2019-01-01 RX ORDER — ONDANSETRON 2 MG/ML
4 INJECTION INTRAMUSCULAR; INTRAVENOUS EVERY 6 HOURS PRN
Status: DISCONTINUED | OUTPATIENT
Start: 2019-01-01 | End: 2019-06-18

## 2019-01-01 RX ORDER — ACETAMINOPHEN 325 MG/1
650 TABLET ORAL EVERY 6 HOURS PRN
Status: DISCONTINUED | OUTPATIENT
Start: 2019-01-01 | End: 2019-01-01

## 2019-01-01 RX ORDER — MAGNESIUM OXIDE 400 MG (241.3 MG MAGNESIUM) TABLET
400 TABLET ONCE
Status: COMPLETED | OUTPATIENT
Start: 2019-01-01 | End: 2019-01-01

## 2019-01-01 RX ORDER — DEXTROSE MONOHYDRATE 25 G/50ML
50 INJECTION, SOLUTION INTRAVENOUS
Status: DISCONTINUED | OUTPATIENT
Start: 2019-01-01 | End: 2019-01-01 | Stop reason: HOSPADM

## 2019-01-01 RX ORDER — IPRATROPIUM BROMIDE AND ALBUTEROL SULFATE 2.5; .5 MG/3ML; MG/3ML
3 SOLUTION RESPIRATORY (INHALATION) EVERY 4 HOURS PRN
Status: DISCONTINUED | OUTPATIENT
Start: 2019-01-01 | End: 2019-06-18

## 2019-01-01 RX ORDER — HYDROMORPHONE HYDROCHLORIDE 1 MG/ML
0.2 INJECTION, SOLUTION INTRAMUSCULAR; INTRAVENOUS; SUBCUTANEOUS EVERY 2 HOUR PRN
Status: CANCELLED | OUTPATIENT
Start: 2019-01-01

## 2019-01-01 RX ORDER — ONDANSETRON 2 MG/ML
4 INJECTION INTRAMUSCULAR; INTRAVENOUS EVERY 4 HOURS PRN
Status: DISCONTINUED | OUTPATIENT
Start: 2019-01-01 | End: 2019-01-01

## 2019-01-01 RX ORDER — MELATONIN
325
Status: DISCONTINUED | OUTPATIENT
Start: 2019-01-01 | End: 2019-01-01

## 2019-01-01 RX ORDER — BUMETANIDE 0.25 MG/ML
1 INJECTION, SOLUTION INTRAMUSCULAR; INTRAVENOUS ONCE
Status: COMPLETED | OUTPATIENT
Start: 2019-01-01 | End: 2019-01-01

## 2019-01-01 RX ORDER — LEVOFLOXACIN 5 MG/ML
750 INJECTION, SOLUTION INTRAVENOUS
Status: DISCONTINUED | OUTPATIENT
Start: 2019-01-01 | End: 2019-01-01

## 2019-01-01 RX ORDER — ALBUTEROL SULFATE 2.5 MG/3ML
2.5 SOLUTION RESPIRATORY (INHALATION) ONCE
Status: COMPLETED | OUTPATIENT
Start: 2019-01-01 | End: 2019-01-01

## 2019-01-01 RX ORDER — MORPHINE SULFATE 4 MG/ML
2 INJECTION, SOLUTION INTRAMUSCULAR; INTRAVENOUS EVERY 10 MIN PRN
Status: DISCONTINUED | OUTPATIENT
Start: 2019-01-01 | End: 2019-01-01 | Stop reason: HOSPADM

## 2019-01-01 RX ORDER — LORAZEPAM 2 MG/ML
2 INJECTION INTRAMUSCULAR EVERY 4 HOURS PRN
Status: DISCONTINUED | OUTPATIENT
Start: 2019-01-01 | End: 2019-06-18

## 2019-01-01 RX ORDER — BUMETANIDE 1 MG/1
0.5 TABLET ORAL DAILY
Status: DISCONTINUED | OUTPATIENT
Start: 2019-01-01 | End: 2019-01-01

## 2019-01-01 RX ORDER — SODIUM CHLORIDE 0.9 % (FLUSH) 0.9 %
3 SYRINGE (ML) INJECTION AS NEEDED
Status: CANCELLED | OUTPATIENT
Start: 2019-01-01

## 2019-01-01 RX ORDER — MORPHINE SULFATE 10 MG/ML
6 INJECTION, SOLUTION INTRAMUSCULAR; INTRAVENOUS EVERY 10 MIN PRN
Status: DISCONTINUED | OUTPATIENT
Start: 2019-01-01 | End: 2019-01-01 | Stop reason: HOSPADM

## 2019-01-01 RX ORDER — HEPARIN SODIUM (PORCINE) LOCK FLUSH IV SOLN 100 UNIT/ML 100 UNIT/ML
5 SOLUTION INTRAVENOUS ONCE
Status: COMPLETED | OUTPATIENT
Start: 2019-01-01 | End: 2019-01-01

## 2019-01-01 RX ORDER — ONDANSETRON 2 MG/ML
4 INJECTION INTRAMUSCULAR; INTRAVENOUS EVERY 6 HOURS PRN
Status: CANCELLED | OUTPATIENT
Start: 2019-01-01

## 2019-01-01 RX ORDER — ONDANSETRON 2 MG/ML
4 INJECTION INTRAMUSCULAR; INTRAVENOUS ONCE AS NEEDED
Status: DISCONTINUED | OUTPATIENT
Start: 2019-01-01 | End: 2019-01-01 | Stop reason: HOSPADM

## 2019-01-01 RX ORDER — BACITRACIN 50000 [USP'U]/1
INJECTION, POWDER, LYOPHILIZED, FOR SOLUTION INTRAMUSCULAR
Status: COMPLETED
Start: 2019-01-01 | End: 2019-01-01

## 2019-01-01 RX ORDER — CYANOCOBALAMIN 1000 UG/ML
1000 INJECTION INTRAMUSCULAR; SUBCUTANEOUS ONCE
Status: CANCELLED | OUTPATIENT
Start: 2019-01-01

## 2019-01-01 RX ORDER — PROCHLORPERAZINE MALEATE 10 MG
TABLET ORAL
Qty: 360 TABLET | Refills: 3 | OUTPATIENT
Start: 2019-01-01

## 2019-01-01 RX ORDER — PROCHLORPERAZINE MALEATE 10 MG
10 TABLET ORAL EVERY 6 HOURS PRN
Status: CANCELLED | OUTPATIENT
Start: 2019-01-01

## 2019-01-01 RX ORDER — IPRATROPIUM BROMIDE AND ALBUTEROL SULFATE 2.5; .5 MG/3ML; MG/3ML
3 SOLUTION RESPIRATORY (INHALATION) EVERY 6 HOURS
Status: DISCONTINUED | OUTPATIENT
Start: 2019-01-01 | End: 2019-01-01

## 2019-01-01 RX ORDER — ONDANSETRON HYDROCHLORIDE 8 MG/1
8 TABLET, FILM COATED ORAL EVERY 8 HOURS PRN
Status: DISCONTINUED | OUTPATIENT
Start: 2019-01-01 | End: 2019-01-01

## 2019-01-01 RX ORDER — DILTIAZEM HYDROCHLORIDE 60 MG/1
60 TABLET, FILM COATED ORAL AS NEEDED
Status: DISCONTINUED | OUTPATIENT
Start: 2019-01-01 | End: 2019-01-01

## 2019-01-01 RX ORDER — SODIUM CHLORIDE 0.9 % (FLUSH) 0.9 %
3 SYRINGE (ML) INJECTION AS NEEDED
Status: DISCONTINUED | OUTPATIENT
Start: 2019-01-01 | End: 2019-06-18

## 2019-01-01 RX ORDER — DILTIAZEM HYDROCHLORIDE 300 MG/1
300 CAPSULE, COATED, EXTENDED RELEASE ORAL DAILY
Status: DISCONTINUED | OUTPATIENT
Start: 2019-01-01 | End: 2019-01-01

## 2019-01-01 RX ORDER — METHYLPREDNISOLONE SODIUM SUCCINATE 125 MG/2ML
125 INJECTION, POWDER, LYOPHILIZED, FOR SOLUTION INTRAMUSCULAR; INTRAVENOUS ONCE
Status: COMPLETED | OUTPATIENT
Start: 2019-01-01 | End: 2019-01-01

## 2019-01-01 RX ORDER — CALCIUM GLUCONATE 94 MG/ML
1 INJECTION, SOLUTION INTRAVENOUS ONCE
Status: COMPLETED | OUTPATIENT
Start: 2019-01-01 | End: 2019-01-01

## 2019-01-01 RX ORDER — MAGNESIUM OXIDE 400 MG (241.3 MG MAGNESIUM) TABLET
1 TABLET NIGHTLY
Status: DISCONTINUED | OUTPATIENT
Start: 2019-01-01 | End: 2019-01-01

## 2019-01-01 RX ORDER — ONDANSETRON 2 MG/ML
8 INJECTION INTRAMUSCULAR; INTRAVENOUS EVERY 6 HOURS PRN
Status: CANCELLED | OUTPATIENT
Start: 2019-01-01

## 2019-01-01 RX ORDER — SODIUM CHLORIDE 9 MG/ML
INJECTION, SOLUTION INTRAVENOUS
Status: DISCONTINUED
Start: 2019-01-01 | End: 2019-01-01

## 2019-01-01 RX ORDER — BUMETANIDE 2 MG/1
2 TABLET ORAL DAILY
Status: ON HOLD | COMMUNITY
End: 2019-01-01

## 2019-01-01 RX ORDER — PANTOPRAZOLE SODIUM 40 MG/1
40 TABLET, DELAYED RELEASE ORAL
Status: ON HOLD | COMMUNITY
End: 2019-01-01

## 2019-01-01 RX ORDER — LEVOFLOXACIN 5 MG/ML
500 INJECTION, SOLUTION INTRAVENOUS EVERY 24 HOURS
Status: DISCONTINUED | OUTPATIENT
Start: 2019-01-01 | End: 2019-01-01

## 2019-01-01 RX ORDER — BUMETANIDE 1 MG/1
2 TABLET ORAL DAILY
Status: DISCONTINUED | OUTPATIENT
Start: 2019-01-01 | End: 2019-01-01

## 2019-01-01 RX ORDER — MELATONIN
325
Qty: 30 TABLET | Refills: 0 | Status: ON HOLD | OUTPATIENT
Start: 2019-01-01 | End: 2019-01-01

## 2019-01-01 RX ORDER — LIDOCAINE HYDROCHLORIDE 10 MG/ML
INJECTION, SOLUTION EPIDURAL; INFILTRATION; INTRACAUDAL; PERINEURAL AS NEEDED
Status: DISCONTINUED | OUTPATIENT
Start: 2019-01-01 | End: 2019-01-01 | Stop reason: SURG

## 2019-01-01 RX ORDER — PROCHLORPERAZINE MALEATE 10 MG
10 TABLET ORAL EVERY 6 HOURS PRN
Qty: 60 TABLET | Refills: 3 | Status: ON HOLD | OUTPATIENT
Start: 2019-01-01 | End: 2019-01-01

## 2019-01-01 RX ORDER — HEPARIN SODIUM 5000 [USP'U]/ML
5000 INJECTION, SOLUTION INTRAVENOUS; SUBCUTANEOUS EVERY 12 HOURS SCHEDULED
Status: DISCONTINUED | OUTPATIENT
Start: 2019-01-01 | End: 2019-01-01

## 2019-01-01 RX ORDER — MIDAZOLAM HYDROCHLORIDE 1 MG/ML
INJECTION INTRAMUSCULAR; INTRAVENOUS
Status: COMPLETED
Start: 2019-01-01 | End: 2019-01-01

## 2019-01-01 RX ORDER — ONDANSETRON HYDROCHLORIDE 8 MG/1
8 TABLET, FILM COATED ORAL EVERY 8 HOURS PRN
Qty: 30 TABLET | Refills: 3 | Status: ON HOLD | OUTPATIENT
Start: 2019-01-01 | End: 2019-01-01

## 2019-01-01 RX ORDER — DICYCLOMINE HYDROCHLORIDE 10 MG/ML
20 INJECTION INTRAMUSCULAR ONCE
Status: COMPLETED | OUTPATIENT
Start: 2019-01-01 | End: 2019-01-01

## 2019-01-01 RX ORDER — HYDROMORPHONE HCL IN 0.9% NACL 0.2 MG/ML
0.2 PLASTIC BAG, INJECTION (ML) INTRAVENOUS CONTINUOUS
Status: DISCONTINUED | OUTPATIENT
Start: 2019-01-01 | End: 2019-06-18

## 2019-01-01 RX ORDER — HYDROMORPHONE HYDROCHLORIDE 2 MG/1
2 TABLET ORAL EVERY 2 HOUR PRN
Status: DISCONTINUED | OUTPATIENT
Start: 2019-01-01 | End: 2019-06-18

## 2019-01-01 RX ORDER — CYANOCOBALAMIN 1000 UG/ML
INJECTION INTRAMUSCULAR; SUBCUTANEOUS
Status: COMPLETED
Start: 2019-01-01 | End: 2019-01-01

## 2019-01-01 RX ORDER — METHYLPREDNISOLONE SODIUM SUCCINATE 40 MG/ML
40 INJECTION, POWDER, LYOPHILIZED, FOR SOLUTION INTRAMUSCULAR; INTRAVENOUS EVERY 8 HOURS
Status: DISCONTINUED | OUTPATIENT
Start: 2019-01-01 | End: 2019-01-01

## 2019-01-01 RX ORDER — HALOPERIDOL 5 MG/ML
2 INJECTION INTRAMUSCULAR
Status: DISCONTINUED | OUTPATIENT
Start: 2019-01-01 | End: 2019-06-18

## 2019-01-01 RX ORDER — HYDROMORPHONE HYDROCHLORIDE 1 MG/ML
0.8 INJECTION, SOLUTION INTRAMUSCULAR; INTRAVENOUS; SUBCUTANEOUS EVERY 2 HOUR PRN
Status: CANCELLED | OUTPATIENT
Start: 2019-01-01

## 2019-01-01 RX ORDER — MAGNESIUM OXIDE 400 MG (241.3 MG MAGNESIUM) TABLET
400 TABLET ONCE
Status: DISCONTINUED | OUTPATIENT
Start: 2019-01-01 | End: 2019-01-01

## 2019-01-01 RX ORDER — DEXTROSE MONOHYDRATE 25 G/50ML
50 INJECTION, SOLUTION INTRAVENOUS ONCE
Status: COMPLETED | OUTPATIENT
Start: 2019-01-01 | End: 2019-01-01

## 2019-01-01 RX ORDER — FUROSEMIDE 10 MG/ML
20 INJECTION INTRAMUSCULAR; INTRAVENOUS ONCE
Status: COMPLETED | OUTPATIENT
Start: 2019-01-01 | End: 2019-01-01

## 2019-01-01 RX ORDER — BUDESONIDE AND FORMOTEROL FUMARATE DIHYDRATE 160; 4.5 UG/1; UG/1
2 AEROSOL RESPIRATORY (INHALATION) 2 TIMES DAILY
Status: DISCONTINUED | OUTPATIENT
Start: 2019-01-01 | End: 2019-01-01

## 2019-01-01 RX ORDER — SENNA AND DOCUSATE SODIUM 50; 8.6 MG/1; MG/1
2 TABLET, FILM COATED ORAL
Status: DISCONTINUED | OUTPATIENT
Start: 2019-01-01 | End: 2019-01-01

## 2019-01-01 RX ORDER — HALOPERIDOL 5 MG/ML
1 INJECTION INTRAMUSCULAR
Status: DISCONTINUED | OUTPATIENT
Start: 2019-01-01 | End: 2019-06-18

## 2019-01-01 RX ORDER — BUMETANIDE 0.25 MG/ML
1 INJECTION, SOLUTION INTRAMUSCULAR; INTRAVENOUS DAILY
Status: DISCONTINUED | OUTPATIENT
Start: 2019-01-01 | End: 2019-01-01

## 2019-01-01 RX ORDER — DIGOXIN 125 MCG
0.25 TABLET ORAL DAILY
Status: DISCONTINUED | OUTPATIENT
Start: 2019-01-01 | End: 2019-01-01

## 2019-01-01 RX ORDER — HYDROCODONE BITARTRATE AND ACETAMINOPHEN 5; 325 MG/1; MG/1
1 TABLET ORAL EVERY 12 HOURS PRN
Qty: 10 TABLET | Refills: 0 | Status: SHIPPED | OUTPATIENT
Start: 2019-01-01 | End: 2019-01-01

## 2019-01-01 RX ORDER — DEXAMETHASONE SODIUM PHOSPHATE 4 MG/ML
VIAL (ML) INJECTION AS NEEDED
Status: DISCONTINUED | OUTPATIENT
Start: 2019-01-01 | End: 2019-01-01 | Stop reason: SURG

## 2019-01-01 RX ORDER — ONDANSETRON 4 MG/1
4 TABLET, ORALLY DISINTEGRATING ORAL EVERY 6 HOURS PRN
Status: DISCONTINUED | OUTPATIENT
Start: 2019-01-01 | End: 2019-06-18

## 2019-01-01 RX ORDER — AMOXICILLIN 250 MG/1
250 CAPSULE ORAL
Status: ON HOLD | COMMUNITY
End: 2019-01-01

## 2019-01-01 RX ORDER — ONDANSETRON 2 MG/ML
INJECTION INTRAMUSCULAR; INTRAVENOUS AS NEEDED
Status: DISCONTINUED | OUTPATIENT
Start: 2019-01-01 | End: 2019-01-01 | Stop reason: SURG

## 2019-01-01 RX ORDER — MORPHINE SULFATE 20 MG/ML
5 SOLUTION ORAL ONCE
Status: COMPLETED | OUTPATIENT
Start: 2019-01-01 | End: 2019-01-01

## 2019-01-01 RX ORDER — NITROGLYCERIN 0.4 MG/1
0.4 TABLET SUBLINGUAL EVERY 5 MIN PRN
Status: DISCONTINUED | OUTPATIENT
Start: 2019-01-01 | End: 2019-01-01

## 2019-01-01 RX ORDER — CYANOCOBALAMIN 1000 UG/ML
1000 INJECTION INTRAMUSCULAR; SUBCUTANEOUS ONCE
Status: COMPLETED | OUTPATIENT
Start: 2019-01-01 | End: 2019-01-01

## 2019-01-01 RX ORDER — BUMETANIDE 1 MG/1
1 TABLET ORAL DAILY
Status: DISCONTINUED | OUTPATIENT
Start: 2019-01-01 | End: 2019-01-01

## 2019-01-01 RX ORDER — MORPHINE SULFATE 20 MG/ML
10 SOLUTION ORAL EVERY 4 HOURS PRN
Qty: 118 ML | Refills: 0 | Status: ON HOLD | OUTPATIENT
Start: 2019-01-01 | End: 2019-01-01

## 2019-01-01 RX ORDER — LORAZEPAM 2 MG/ML
1 INJECTION INTRAMUSCULAR EVERY 4 HOURS PRN
Status: DISCONTINUED | OUTPATIENT
Start: 2019-01-01 | End: 2019-06-18

## 2019-01-01 RX ORDER — 0.9 % SODIUM CHLORIDE 0.9 %
VIAL (ML) INJECTION
Status: COMPLETED
Start: 2019-01-01 | End: 2019-01-01

## 2019-01-01 RX ORDER — DILTIAZEM HYDROCHLORIDE 180 MG/1
180 CAPSULE, EXTENDED RELEASE ORAL DAILY
Status: DISCONTINUED | OUTPATIENT
Start: 2019-01-01 | End: 2019-01-01

## 2019-01-01 RX ORDER — FUROSEMIDE 40 MG/1
40 TABLET ORAL EVERY 8 HOURS PRN
Status: DISCONTINUED | OUTPATIENT
Start: 2019-01-01 | End: 2019-06-18

## 2019-01-01 RX ORDER — ONDANSETRON 4 MG/1
8 TABLET, FILM COATED ORAL EVERY 8 HOURS PRN
Status: DISCONTINUED | OUTPATIENT
Start: 2019-01-01 | End: 2019-01-01

## 2019-01-01 RX ORDER — FUROSEMIDE 10 MG/ML
40 INJECTION INTRAMUSCULAR; INTRAVENOUS EVERY 8 HOURS PRN
Status: DISCONTINUED | OUTPATIENT
Start: 2019-01-01 | End: 2019-06-18

## 2019-01-01 RX ORDER — SODIUM POLYSTYRENE SULFONATE 4.1 MEQ/G
15 POWDER, FOR SUSPENSION ORAL; RECTAL ONCE
Status: COMPLETED | OUTPATIENT
Start: 2019-01-01 | End: 2019-01-01

## 2019-01-01 RX ORDER — ONDANSETRON 2 MG/ML
4 INJECTION INTRAMUSCULAR; INTRAVENOUS ONCE
Status: COMPLETED | OUTPATIENT
Start: 2019-01-01 | End: 2019-01-01

## 2019-01-01 RX ORDER — 0.9 % SODIUM CHLORIDE 0.9 %
3 VIAL (ML) INJECTION AS NEEDED
Status: DISCONTINUED | OUTPATIENT
Start: 2019-01-01 | End: 2019-01-01

## 2019-01-01 RX ORDER — SODIUM CHLORIDE 9 MG/ML
INJECTION, SOLUTION INTRAVENOUS CONTINUOUS
Status: ACTIVE | OUTPATIENT
Start: 2019-01-01 | End: 2019-01-01

## 2019-01-01 RX ORDER — METRONIDAZOLE 500 MG/100ML
500 INJECTION, SOLUTION INTRAVENOUS EVERY 8 HOURS
Status: DISCONTINUED | OUTPATIENT
Start: 2019-01-01 | End: 2019-01-01

## 2019-01-01 RX ORDER — SCOLOPAMINE TRANSDERMAL SYSTEM 1 MG/1
1 PATCH, EXTENDED RELEASE TRANSDERMAL
Status: DISCONTINUED | OUTPATIENT
Start: 2019-01-01 | End: 2019-06-18

## 2019-01-01 RX ORDER — HYDROMORPHONE HYDROCHLORIDE 1 MG/ML
0.5 INJECTION, SOLUTION INTRAMUSCULAR; INTRAVENOUS; SUBCUTANEOUS
Status: DISCONTINUED | OUTPATIENT
Start: 2019-01-01 | End: 2019-06-18

## 2019-01-01 RX ORDER — SODIUM CHLORIDE 9 MG/ML
INJECTION, SOLUTION INTRAVENOUS
Status: DISPENSED
Start: 2019-01-01 | End: 2019-01-01

## 2019-01-01 RX ORDER — METHYLPREDNISOLONE SODIUM SUCCINATE 40 MG/ML
40 INJECTION, POWDER, LYOPHILIZED, FOR SOLUTION INTRAMUSCULAR; INTRAVENOUS EVERY 6 HOURS
Status: DISCONTINUED | OUTPATIENT
Start: 2019-01-01 | End: 2019-01-01

## 2019-01-01 RX ORDER — BISACODYL 10 MG
10 SUPPOSITORY, RECTAL RECTAL
Status: DISCONTINUED | OUTPATIENT
Start: 2019-01-01 | End: 2019-01-01

## 2019-01-01 RX ORDER — SODIUM CHLORIDE 0.9 % (FLUSH) 0.9 %
10 SYRINGE (ML) INJECTION AS NEEDED
Status: DISCONTINUED | OUTPATIENT
Start: 2019-01-01 | End: 2019-06-18

## 2019-01-01 RX ADMIN — DEXAMETHASONE SODIUM PHOSPHATE 4 MG: 4 MG/ML VIAL (ML) INJECTION at 07:54:00

## 2019-01-01 RX ADMIN — LIDOCAINE HYDROCHLORIDE 20 MG: 10 INJECTION, SOLUTION EPIDURAL; INFILTRATION; INTRACAUDAL; PERINEURAL at 07:37:00

## 2019-01-01 RX ADMIN — SODIUM CHLORIDE: 9 INJECTION, SOLUTION INTRAVENOUS at 07:22:00

## 2019-01-01 RX ADMIN — CEFAZOLIN SODIUM/WATER 2 G: 2 G/20 ML SYRINGE (ML) INTRAVENOUS at 07:40:00

## 2019-01-01 RX ADMIN — HEPARIN SODIUM (PORCINE) LOCK FLUSH IV SOLN 100 UNIT/ML 500 UNITS: 100 SOLUTION INTRAVENOUS at 13:28:00

## 2019-01-01 RX ADMIN — HEPARIN SODIUM (PORCINE) LOCK FLUSH IV SOLN 100 UNIT/ML 500 UNITS: 100 SOLUTION INTRAVENOUS at 13:42:00

## 2019-01-01 RX ADMIN — CYANOCOBALAMIN 1000 MCG: 1000 INJECTION INTRAMUSCULAR; SUBCUTANEOUS at 09:10:00

## 2019-01-01 RX ADMIN — HEPARIN SODIUM (PORCINE) LOCK FLUSH IV SOLN 100 UNIT/ML 500 UNITS: 100 SOLUTION INTRAVENOUS at 11:23:00

## 2019-01-01 RX ADMIN — SODIUM CHLORIDE 1000 ML: 9 INJECTION, SOLUTION INTRAVENOUS at 11:52:00

## 2019-01-01 RX ADMIN — SODIUM CHLORIDE: 9 INJECTION, SOLUTION INTRAVENOUS at 07:30:00

## 2019-01-01 RX ADMIN — LIDOCAINE HYDROCHLORIDE 50 MG: 10 INJECTION, SOLUTION EPIDURAL; INFILTRATION; INTRACAUDAL; PERINEURAL at 14:58:00

## 2019-01-01 RX ADMIN — SODIUM CHLORIDE: 9 INJECTION, SOLUTION INTRAVENOUS at 08:21:00

## 2019-01-01 RX ADMIN — ONDANSETRON 4 MG: 2 INJECTION INTRAMUSCULAR; INTRAVENOUS at 07:54:00

## 2019-01-01 RX ADMIN — SODIUM CHLORIDE, SODIUM LACTATE, POTASSIUM CHLORIDE, CALCIUM CHLORIDE: 600; 310; 30; 20 INJECTION, SOLUTION INTRAVENOUS at 15:05:00

## 2019-01-01 RX ADMIN — HEPARIN SODIUM (PORCINE) LOCK FLUSH IV SOLN 100 UNIT/ML 500 UNITS: 100 SOLUTION INTRAVENOUS at 10:51:00

## 2019-01-01 RX ADMIN — HEPARIN SODIUM (PORCINE) LOCK FLUSH IV SOLN 100 UNIT/ML 500 UNITS: 100 SOLUTION INTRAVENOUS at 11:31:00

## 2019-01-01 RX ADMIN — SODIUM CHLORIDE, SODIUM LACTATE, POTASSIUM CHLORIDE, CALCIUM CHLORIDE: 600; 310; 30; 20 INJECTION, SOLUTION INTRAVENOUS at 14:55:00

## 2019-01-10 PROBLEM — I50.9 ACUTE ON CHRONIC CONGESTIVE HEART FAILURE, UNSPECIFIED HEART FAILURE TYPE (HCC): Status: RESOLVED | Noted: 2018-01-01 | Resolved: 2019-01-01

## 2019-01-10 PROBLEM — I50.30 (HFPEF) HEART FAILURE WITH PRESERVED EJECTION FRACTION (HCC): Chronic | Status: ACTIVE | Noted: 2019-01-01

## 2019-01-10 PROBLEM — J96.21 ACUTE ON CHRONIC RESPIRATORY FAILURE WITH HYPOXEMIA (HCC): Chronic | Status: ACTIVE | Noted: 2019-01-01

## 2019-01-10 PROBLEM — I48.19 ATRIAL FIBRILLATION, PERSISTENT (HCC): Chronic | Status: ACTIVE | Noted: 2019-01-01

## 2019-01-10 NOTE — PATIENT INSTRUCTIONS
Continue home oxygen at 3 liters per nasal cannula     Continue all your same medications    Call if having any dizziness, lightheadedness, heart racing, palpitations, chest pain, shortness of breath, coughing, swelling, weight gain, fever, chills    Weigh

## 2019-01-10 NOTE — PROGRESS NOTES
[address] Patient Status:  No patient class for patient encounter    1940 MRN [id number]   Location [address] [facility] MD Dr. Yordan Contreras is a 7 negative  Musculoskeletal: negative for myalgias    Objective:  Lab Results   Component Value Date/Time    WBC 9.1 12/26/2018 05:04 AM    HGB 9.1 (L) 12/26/2018 05:04 AM    HCT 28.1 (L) 12/26/2018 05:04 AM     (L) 12/26/2018 05:04 AM    CREATSERUM 1 scarring/atelectasis. No acute airspace disease. Prominent bilateral hilar regions stable.        PFT's: 1/10/2019    FEV1 19  %  FEV1/FVC 25 %    mMRC: 4    Vaccines:   Pneumonia 12/23/15 prevnar 13, 11/2014 PPV 23  Flu: 10/2018    Education:  Patient inst medications    Call if having any dizziness, lightheadedness, heart racing, palpitations, chest pain, shortness of breath, coughing, swelling, weight gain, fever, chills    Weigh yourself daily in the morning before breakfast, call if gaining 3 lbs or more Disp: 1 each, Rfl: 0  •  predniSONE 20 MG Oral Tab, Take 1 tablet (20 mg total) by mouth daily with breakfast., Disp: 10 tablet, Rfl: 0  •  albuterol sulfate (2.5 MG/3ML) 0.083% Inhalation Nebu Soln, Take 2.5 mg by nebulization 4 (four) times daily as need

## 2019-01-10 NOTE — PROGRESS NOTES
Denver Neighbors is a 77-year-old male who I know well from prior evaluation. I had not seen him for years but then reevaluated him while he was in the hospital recently for COPD exacerbation. This juncture.   He is stabilizing to slightly improved since he was dis

## 2019-01-16 PROBLEM — K92.1 MELENA: Status: ACTIVE | Noted: 2019-01-01

## 2019-01-17 PROBLEM — R79.89 ELEVATED LACTIC ACID LEVEL: Status: ACTIVE | Noted: 2019-01-01

## 2019-01-17 NOTE — CONSULTS
[facility]    PATIENT'S NAME: Kellie Elizabeths   ATTENDING PHYSICIAN: Havrey Varela DO   CONSULTING PHYSICIAN: Casey Davila MD   PATIENT ACCOUNT#:   [id number]    LOCATION:  [address] [place] [address] Rd #:   [id number]       DATE OF BIRTH: morning; diltiazem  every morning; insulin; ferrous sulfate; Bumex 1 mg daily; prednisone 20 mg daily for COPD; Breo Ellipta; Ventolin; lisinopril 5 every morning; metformin 500 b.i.d.; levothyroxine 0.05 every morning; atorvastatin 80 at bedtime; ro situation. Does not need additional rate control at this time. His last dose of Eliquis was over 36 hours ago, and it is being taken once daily.   Thus, the effect of this should be out of his system soon, and I do not think that there is a significant ad

## 2019-01-17 NOTE — CONSULTS
Cardiology (consult dictated)    Assessment:  1. Melena. To have GI evaluation. Probable EGD today. Severe anemia with hemoglobin 6.2.    2.  Chronic atrial fibrillation. Has been on Eliquis at home.   Heart rates appropriately elevated but not excessi

## 2019-01-17 NOTE — ANESTHESIA PREPROCEDURE EVALUATION
Anesthesia PreOp Note    HPI:     Jeff Lerner is a 66year old male who presents for preoperative consultation requested by: Blanca Delgado MD    Date of Surgery: 1/16/2019 - 1/17/2019    Procedure(s):  ESOPHAGOGASTRODUODENOSCOPY (EGD)  Indication: m CABG   • Cataract    • COPD (chronic obstructive pulmonary disease) (HCC)    • Coronary atherosclerosis    • Diabetes ([address]    • Disorder of thyroid    • High blood pressure    • High cholesterol    • Hyperlipidemia    • Pneumothorax     failed pleurodesis Oral Tab Take 5 mg by mouth daily. Disp:  Rfl:  Taking   MetFORMIN HCl (GLUCOPHAGE) 500 MG Oral Tab 500 mg 2 (two) times daily with meals.    Disp:  Rfl:  Taking   Levothyroxine Sodium (SYNTHROID) 50 MCG Oral Tab Take 50 mcg by mouth before breakfast.   Dis mg 50 mg Oral Q6H PRN Mally Bark, DO    dextrose 50 % injection 50 mL 50 mL Intravenous PRN Mally Bark, DO 50 mL at 01/17/19 0622   Glucose-Vitamin C (DEX-4) 4-6 GM-MG chewable tab 4 tablet 4 tablet Oral Q15 Min PRN Mally Bark, DO    glucose (L) 01/17/2019    MCV 91.0 01/17/2019    MCV 91.0 01/17/2019    MCH 29.4 01/17/2019    MCH 29.4 01/17/2019    MCHC 32.3 01/17/2019    MCHC 32.3 01/17/2019    RDW 14.8 01/17/2019    RDW 14.8 01/17/2019     (L) 01/17/2019     (L) 01/17/2019 Courlas and/or legal guardian or family member of the nature of the anesthetic plan, benefits, risks including possible dental damage if relevant, major complications, and any alternative forms of anesthetic management.    All of the patient's questions wer

## 2019-01-17 NOTE — ED NOTES
Assumed care of patient from triage who comes in with complaints of rlq abd pain and dark stool x 4 days. Pt was recently started on pantoprazole r/t to and pain by dr Dharmesh Camara two days ago. Pt stated pain has not gotten any better. line & labs started.  Pt pl

## 2019-01-17 NOTE — ED PROVIDER NOTES
Patient Seen in: [facility] Emergency Department    History   Patient presents with:  Abdomen/Flank Pain (GI/)    Stated Complaint: abdominal pain, dark stools      HPI    65 yo M with PMH CAD s/p CABG, DM, HTN, HL, COPD, afib on eliquis therapy p night - 15- 20 mmhg knee high   predniSONE 20 MG Oral Tab,  Take 1 tablet (20 mg total) by mouth daily with breakfast.   albuterol sulfate (2.5 MG/3ML) 0.083% Inhalation Nebu Soln,  Take 2.5 mg by nebulization 4 (four) times daily as needed for Wheezing. Physical Exam   Constitutional: No distress. Nontoxic, resting comfortably. HEENT: MMM. Head: Normocephalic. Eyes: No injection. Cardiovascular: Irregular. Pulmonary/Chest: Effort normal. CTAB. Abdominal: Soft.  Mild periumbilical tendernes MARY)[[id number]]                               In process                 ANTIBODY SCREEN[[id number]]                                  In process                   Please view results for these tests on the individual orders.    ABORH (BLOOD TYPE)   ANTIBODY have received this report in error, please notify the sender immediately at [phone] and permanently delete the original report and destroy any copies or printouts.     MDM     DIFFERENTIAL DIAGNOSIS: After history and physical exam differential diagnos

## 2019-01-17 NOTE — ED NOTES
Per NS nurse Miguel Leo 4 # [id number].  Still need stool sample pt is unable to provide one at this time

## 2019-01-17 NOTE — OPERATIVE REPORT
ESOPHAGOGASTRODUODENOSCOPY REPORT    Patient Name:  J Luis Mora Record #: [id number]  YOB: 1940  Date of Procedure: 1/17/2019    Referring physician: Arelis Contreras MD    Surgeon:  Wily Villalpando MD    Pre-op diagn evaluation as well  Clear liquid diet ok today, will reassess tomorrow    Specimens: stomach  EBL: minimal

## 2019-01-17 NOTE — ED NOTES
Orders for admission, patient is aware of plan and ready to go upstairs.  Any questions, please call ED МАРИНА jaimes  at extension [phone]

## 2019-01-17 NOTE — PROGRESS NOTES
01/17/19 0536   Vital Signs   Temp 98.2 °F (36.8 °C)   Temp src Oral   Pulse 87   Heart Rate Source Monitor   Resp 18   Respiratory Quality Normal   /36   BP Location Right arm   BP Method Automatic   Patient Position Lying   Oxygen Therapy   SpO2

## 2019-01-17 NOTE — CONSULTS
[facility]    Report of Consultation    Júnior Razo Patient Status:  Inpatient    1940 MRN [id number]   Location [facility] 5SW/SE Attending Ramon Mantilla DO   Hosp Day # 0 PCP Arelis Contreras MD     Date mg Nebulization QID PRN   atorvastatin (LIPITOR) tab 80 mg 80 mg Oral Nightly   Fluticasone Furoate-Vilanterol (BREO ELLIPTA) 100-25 MCG/INH inhaler 1 puff 1 puff Inhalation Daily   digoxin (LANOXIN) tab 0.25 mg 0.25 mg Oral Daily   DilTIAZem HCl ER Coated bumetanide (BUMEX) 1 MG Oral Tab Take 1 mg by mouth daily.      predniSONE 20 MG Oral Tab Take 1 tablet (20 mg total) by mouth daily with breakfast.   albuterol sulfate (2.5 MG/3ML) 0.083% Inhalation Nebu Soln Take 2.5 mg by nebulization 4 (four) times da apparent distress  SKIN: no rashes,no suspicious lesions  HEENT: atraumatic, normocephalic, oropharynx clear  NECK: supple,no adenopathy, no masses  LUNGS: clear to auscultation  CARDIO: RRR without murmur  GI: normal active BS, mild ttp in periumbilical a OTHER: Clips in the right breast.         CONCLUSION:  1. Hyperinflation compatible with COPD changes. Linear bibasilar scarring/atelectasis. No acute airspace disease. Prominent bilateral hilar regions stable.      Dictated by (CST): Hanny Howard MD on 1 pericolonic and jim-sigmoidal vasculature with minimal pericolonic and jim-sigmoidal  inflammatory fat stranding appears unchanged and is likely sequela of chronic inflammation. No acute inflammatory changes are seen.  There is no dilation or wall thicken significant discrepancies.   Dictated by (CST): Kassie Maki MD on 1/17/2019 at 8:29     Approved by (CST): Kassie Maki MD on 1/17/2019 at 8:41             Impression:   Mr Coral Queevdo is a 65 y/o male with MMP including COPD on O2, CAD s/p CABG, and afib on e

## 2019-01-17 NOTE — H&P
[address] Patient Status:  Inpatient    1940 MRN [id number]   Location [facility] 5SW/SE Attending Jessie Mares DO   Hosp Day # 0 PCP Arelis Contreras MD     Date:   DilTIAZem HCl ER Coated Beads 300 MG Oral Capsule SR 24 Hr Take 1 capsule (300 mg total) by mouth daily. Insulin NPH & Regular 70/30 (70-30) 100 UNIT/ML Subcutaneous Suspension Inject 15 Units into the skin 2 (two) times daily before meals.    Cholecalc SpO2 94 %. General: No acute distress. Alert and oriented x 3. HEENT: Mildly dry oral mucosa. EOM-I. Respiratory: Clear to auscultation bilaterally. No wheezes, rales, rhonchi. Cardiovascular: Regular rate and rhythm. No murmurs.   1+ pitting lowe (pending GI recommendations)    NSTEMI  -Suspect secondary to severe anemia  -Patient remains asymptomatic  -Cardiology consulted, appreciate recommendations    CODE STATUS: DNR, confirmed with patient    **Certification      PHYSICIAN Certification of [place]

## 2019-01-17 NOTE — ANESTHESIA POSTPROCEDURE EVALUATION
Patient: Casandra Razo    Procedure Summary     Date:  01/17/19 Room / Location:  [facility] ENDOSCOPY 05 / [facility] ENDOSCOPY    Anesthesia Start:  8958 Anesthesia Stop:      Procedure:  ESOPHAGOGASTRODUODENOSCOPY (EGD) (N/A ) Diagnosis:  (Gastritis)    Surgeon:

## 2019-01-18 NOTE — PROGRESS NOTES
[facility]  Progress Note    Nolviajadon Razo Patient Status:  Inpatient    1940 MRN [id number]   Location [facility] 3W/SW Attending Chester Lopes, [address] [facility] Day # 1 PCP Arelis Contreras MD     Assessment:    1.  LAKIA phipps Exam:    General: Alert and oriented x 3,  No apparent distress. HEENT: No focal deficits. Neck: No JVD, carotids 2+ no bruits. Cardiac: Irregular rhythm, S1, S2 normal, 1/6 JOSE at the base  Lungs: Clear without wheezes, rales, rhonchi.   Normal excursio

## 2019-01-18 NOTE — PAYOR COMM NOTE
--------------  ADMISSION REVIEW     Donitatania Graykathleen UNDERWOOD [facility]  Subscriber #:  [id number]  Authorization Number: [id number]     Admit date: 1/17/19  Admit time: 1551 [address]       Admitting Physician: Camille De La Torre DO  Attending Physician:  DO Juwan Arce 360 (*)     Chloride 94 (*)     BUN 48 (*)     BUN/CREA Ratio 32.4 (*)     Calculated Osmolality 315 (*)     GFR, Non- 45 (*)     GFR, -American 52 (*)     All other components within normal limits   CBC W/ DIFFERENTIAL - Abnormal; N 1 Encounters:  01/16/19 : 103  , atrial flutter      Evaluation of abdominal pain in patient with history of aflutter with melena by history and on exam in HDS patient with soft, minimally tender abdomen resting in NAD.  Labs/CT pending with anticipation of Nebulization Meet, Grant, RCP         Insulin Aspart Pen (NOVOLOG) 100 UNIT/ML flexpen 1-5 Units     Date Action Dose Route User    1/17/2019 1901 Given 2 Units Subcutaneous (Right Upper Arm) Felipa Mena RN      lactated ringers infusion     Cl prepping patient for colonoscopy tomorrow. Son and patient are hesitant to proceed and would rather watch tonight.  If there is lack of response tonight from transfusion or if there is further bleeding will likely proceed in that instance  Follow up on post

## 2019-01-18 NOTE — PROGRESS NOTES
[facility]  Gastroenterology Progress Note    Laurent Razo Patient Status:  Inpatient    1940 MRN [id number]   Location [facility] 3W/SW Attending Anthony Szymanski, [address] [facility] Day # 1 PCP MD Jenn Archibald discrepancies.   Dictated by (CST): Lalo Rose MD on 1/17/2019 at 8:29     Approved by (CST): Lalo Rose MD on 1/17/2019 at 8:41            Problem list:  Patient Active Problem List:     COPD (chronic obstructive pulmonary disease) ([facility] [address]     Severe sep symptomatic improvements  -c/w abx, supportive care, monitor response to transfusion  -If there is a sense bleeding or symptoms are worse will plan rediscuss colonoscopy.     Steffanie Lagunas MD  1/18/2019  10:36 AM

## 2019-01-18 NOTE — PROGRESS NOTES
[facility] Pharmacy Note:  Renal Adjustment for levofloxacin (Lisandra Rolling)    Noe Mohr is a 66year old male who has been prescribed levofloxacin (LEVAQUIN) 500 mg every 24 hrs.   CrCl is estimated creatinine clearance is 40.4 mL/min (based on SCr of 1.36 mg/

## 2019-01-18 NOTE — PROGRESS NOTES
[place] [facility]    Progress Note    Josh Razo Patient Status:  Inpatient    1940 MRN [id number]   Location [facility] 3W/SW Attending Rodney Delaney, 1604 [facility] Day # 1 PCP Arelis Contreras MD     LATE ENTRY  Sub 01/16/2019    LIP 27 01/16/2019    MG 2.4 12/26/2018    TROP 0.08 (HH) 01/18/2019       Ct Abdomen Pelvis Iv Contrast, No Oral (er)    Result Date: 1/17/2019  CONCLUSION:   Diverticulosis with mild chronic appearing inflammation adjacent to the descending Melena  -Concerning for mesenteric ischemia  -Managing conservatively due to risk associated with colonscopy  -Continue IV Protonix twice daily  -GI consulted from ED, appreciate recommendations     Normocytic anemia secondary to above  -2nd unit of PRBC

## 2019-01-19 NOTE — PROGRESS NOTES
[place] [facility]    Progress Note    Talavera Ronel Razo Patient Status:  Inpatient    1940 MRN [id number]   Location [facility] 3W/SW Attending Rosanne Mendoza, 1604 [facility] Day # 2 PCP Arelis Contreras MD         Assessment HCl ER Coated Beads  300 mg Oral Daily   • Levothyroxine Sodium  50 mcg Oral Before breakfast   • lisinopril  5 mg Oral Daily   • predniSONE  20 mg Oral Daily with breakfast   • pantoprazole (PROTONIX) IV push  40 mg Intravenous Q12H             Results:

## 2019-01-19 NOTE — PLAN OF CARE
Problem: Diabetes/Glucose Control  Goal: Glucose maintained within prescribed range  INTERVENTIONS:  - Monitor Blood Glucose as ordered  - Assess for signs and symptoms of hyperglycemia and hypoglycemia  - Administer ordered medications to maintain glucose ADULT  Goal: Achieves optimal ventilation and oxygenation  INTERVENTIONS:  - Assess for changes in respiratory status  - Assess for changes in mentation and behavior  - Position to facilitate oxygenation and minimize respiratory effort  - Oxygen supplement of care as needed  Outcome: Progressing      Problem: HEMATOLOGIC - ADULT  Goal: Maintains hematologic stability  INTERVENTIONS  - Assess for signs and symptoms of bleeding or hemorrhage  - Monitor labs and vital signs for trends  - Administer supportive b

## 2019-01-19 NOTE — PROGRESS NOTES
[facility]  Gastroenterology Progress Note    Ana Kary Donovanmelchor Patient Status:  Inpatient    1940 MRN [id number]   Location [facility] 3W/SW Attending Hilda Gerardo, 1604 [facility] Day # 2 PCP Arelis Contreras MD     [facility] Del Castillo MD on 1/18/2019 at 22:23            Problem list:  Patient Active Problem List:     COPD (chronic obstructive pulmonary disease) ([facility] [address]     Severe sepsis ([facility] [address]     Community acquired pneumonia     Hypomagnesemia     Acute exacerbation of  diagnostic at this point. There is a chance he has an occult malignancy in the colon. -C-diff colitis, treat with PO vanc  -Would discuss goals of care with the patient, in particular with CT findings suggestive of metastatic malignancy.  If they would [facility]

## 2019-01-19 NOTE — PAYOR COMM NOTE
--------------  CONTINUED STAY REVIEW    Noé Araiza MA O  Subscriber #:  [id number]  Authorization Number: [id number]     Admit date: 1/17/19  Admit time: 1551 [address]    Admitting Physician: Venkatesh Dubon DO  Attending Physician:  DO Judd Avila Dictated by (CST): Umm Jensen MD on 1/18/2019 at 22:05     Approved by (CST): Umm Jensen MD on 1/18/2019 at 22:23              Problem list:  Patient Active Problem List:     COPD [facility]chronic obstructive pulmonary disease) ([facility]     Severe sepsis pain perspective patient appears to be stable and colonoscopy utility would be purely diagnostic at this point. There is a chance he has an occult malignancy in the colon.   -C-diff colitis, treat with PO vanc  -Would discuss goals of care with the patient, process   -Pulmonology consulted  -May require IR biopsy of liver lesions     Thrombocytopenia  -Unclear etiology, not on heparin, no other signs of TTP  -Continue to monitor     Resolved conditions:  Lactic acidosis  NSTEMI-Suspect secondary to severe ane injection 10 mL     Date Action Dose Route User    1/19/2019 0557 Given 10 mL Intravenous José Miguel Serrano RN    1/18/2019 2135 Given 10 mL Intravenous Zenafilipe Wiggins RN      Pantoprazole Sodium (PROTONIX) 40 mg in Sodium Chloride 0.9 % 10 mL IV push     Date

## 2019-01-19 NOTE — CONSULTS
[facility]    Consult Note    Date:  1/19/2019  Date of Admission:  1/16/2019      Chief Complaint:   Fazal Mills is a(n) 66year old male with pulmonary nodules and chronic respiratory failure.     HPI:   Patient has a history of very Never Used    Alcohol use: No    Drug use: No    Allergies/Medications:    Allergies: No Known Allergies    Medications Prior to Admission:  Pantoprazole Sodium 40 MG Oral Tab EC Take 40 mg by mouth every morning before breakfast.   digoxin 0.25 MG Oral Tab Review of Systems:   Vision normal. Ear nose and throat normal. Bowel normal. Bladder function normal. No depression. No thyroid disease. No rash. Muscles and joints unremarkable. No weight loss no weight gain.     Physical Exam:   Vital Signs:  Blood Ascending thoracic aortic ectasia. Assessment/Plan:   1. Multiple pulmonary and hepatic lesions– the patient would benefit from biopsy and histologic characterization; however, he is at high risk for pneumothorax.   The patient has had pneumothorax

## 2019-01-19 NOTE — PLAN OF CARE
Problem: Diabetes/Glucose Control  Goal: Glucose maintained within prescribed range  INTERVENTIONS:  - Monitor Blood Glucose as ordered  - Assess for signs and symptoms of hyperglycemia and hypoglycemia  - Administer ordered medications to maintain glucose ADULT  Goal: Achieves optimal ventilation and oxygenation  INTERVENTIONS:  - Assess for changes in respiratory status  - Assess for changes in mentation and behavior  - Position to facilitate oxygenation and minimize respiratory effort  - Oxygen supplement of care as needed  Outcome: Progressing      Problem: HEMATOLOGIC - ADULT  Goal: Maintains hematologic stability  INTERVENTIONS  - Assess for signs and symptoms of bleeding or hemorrhage  - Monitor labs and vital signs for trends  - Administer supportive b patient and family perspectives and choices  Outcome: Progressing      Comments: Pt c/o dyspnea with exertion. Seen by pulmonology today. Plan for biopsy of lung or liver this week.

## 2019-01-19 NOTE — PROGRESS NOTES
[place] [facility] - [facility]    Progress Note    Nolvia Razo Patient Status:  Inpatient    1940 MRN [id number]   Location [facility] 3W/SW Attending Chester Lopes, 160[address] [facility] Day # 2 PCP Arelis Contreras MD     LATE ENTRY  Sub nontender, nondistended. Normal bowel sounds. Neurologic: Alert and oriented, moving all 4 extremities without difficulty  Integument: Warm, dry, pink  Psychiatric: Appropriate mood and affect.       Results:     Lab Results   Component Value Date    WBC appreciate recommendations     Normocytic anemia secondary to above  -Hb stable today   -Monitor daily CBC     Diabetes mellitus type 2  -Novolog sliding scale  -Continue 10U levemir     HFpEF  -One-time dose of 1 mg p.o.  Bumex ordered  -Daily 1 mg Bumex h

## 2019-01-20 NOTE — PROGRESS NOTES
[facility]    Progress Note    Nolvia Graysongonzales Razo Patient Status:  Inpatient    1940 MRN [id number]   Location [facility] 3W/SW Attending Chester Lopes, 1604 [facility] Day # 3 PCP Arelis Contreras MD       Subjective: and affect.       Results:     Lab Results   Component Value Date    WBC 6.6 01/20/2019    HGB 8.7 (L) 01/20/2019    HCT 26.5 (L) 01/20/2019     (L) 01/20/2019    CREATSERUM 1.14 01/19/2019    BUN 33 (H) 01/19/2019     01/19/2019    K 4.6 01/19 units  -Consider endocrinology consult if continued difficulty managing sugars     HFpEF  -One-time dose of 1 mg p.o.  Bumex ordered  -Daily 1 mg Bumex has been on hold    Pulmonary nodules   -Multiple pulmonary nodules concerning for neoplastic process   -

## 2019-01-20 NOTE — PROGRESS NOTES
[facility]    Progress Note    Abdelrahman Razo Patient Status:  Inpatient    1940 MRN [id number]   Location [facility] 3W/SW Attending Mary Kay Prince, [address] [facility] Day # 3 PCP Arelis Contreras MD        Subjective 2.4 12/26/2018    TROP 0.08 (HH) 01/18/2019       Ct Chest (dip=[id number])    Result Date: 1/18/2019  CONCLUSION:  1. There has been interval increase in size of previously seen pleural based nodules, as well as interval development of new areas of nodularity.

## 2019-01-20 NOTE — PROGRESS NOTES
[facility]  Gastroenterology Progress Note    Casandra Razo Patient Status:  Inpatient    1940 MRN [id number]   Location [facility] 3W/SW Attending Camille De La Torre, [address] [facility] Day # 3 PCP MD Meron Archibald acquired pneumonia     Hypomagnesemia     Acute exacerbation of COPD with asthma ([address]     Hyperglycemia     Anemia     Azotemia     COPD exacerbation (HCC)     Sepsis due to pneumonia ([address]     Acute on chronic diastolic HF (heart failure) ([facility])     PE (p

## 2019-01-20 NOTE — OCCUPATIONAL THERAPY NOTE
OCCUPATIONAL THERAPY EVALUATION - INPATIENT     Room Number: 305/305-A  Evaluation Date: 1/20/2019  Type of Evaluation: Initial  Presenting Problem: Abdominal pain and dark stool    Physician Order: IP Consult to Occupational Therapy  Reason for Therapy: A Laterality Date   • OPEN HEART SURGICAL PROFILE         HOME SITUATION  Type of Home: House  Home Layout: One level  Lives With: Spouse     Toilet and Equipment: Toilet riser  Shower/Tub and Equipment: Shower chair;Grab bar;Tub-shower combo             Dri ASSESSMENT  Static Sitting: independent  Dynamic Sitting: independent  Static Standing: supervision  Dynamic Standing: supervision    FUNCTIONAL ADL ASSESSMENT  Grooming: independent  Feeding: independent  Bathing: min A  Toileting: supervision  Upper Extr

## 2019-01-20 NOTE — PROGRESS NOTES
[facility]    Progress Note      Assessment and Plan:   1. Multiple pulmonary and hepatic lesions– the patient would benefit from biopsy and histologic characterization; however, he is at high risk for pneumothorax.   The patient has had pn MD  Medical Director, [address], [facility]  Medical Director, [address] [place] [address]. [phone] P  Pager: [phone]

## 2019-01-21 NOTE — PHYSICAL THERAPY NOTE
PHYSICAL THERAPY EVALUATION - INPATIENT     Room Number: 305/305-A  Evaluation Date: 1/21/2019  Type of Evaluation: Initial   Physician Order: PT Eval and Treat    Presenting Problem: melena  Reason for Therapy: Mobility Dysfunction and Discharge Planning Good  Frequency (Obs): 5x/week       PHYSICAL THERAPY MEDICAL/SOCIAL HISTORY   Problem List  Principal Problem:    Melena  Active Problems:    Hyperglycemia    Elevated lactic acid level      Past Medical History  Past Medical History:   Diagnosis Date   • bedclothes, sheets and blankets)?: None   -   Sitting down on and standing up from a chair with arms (e.g., wheelchair, bedside commode, etc.): None   -   Moving from lying on back to sitting on the side of the bed?: None   How much help from another perso in preparation for discharge.    Goal #5   Current Status    Goal #6    Goal #6  Current Status

## 2019-01-21 NOTE — PROGRESS NOTES
[facility]  Progress Note    Rani Razo Patient Status:  Inpatient    1940 MRN [id number]   Location [facility] 3W/SW Attending Pedro Salinas, 1604 [facility] Day # 4 PCP Arelis Contreras MD     Assessment:    1.  LAKIA phipps bruits. Cardiac: Regular rate and rhythm, S1, S2 normal, no murmur  Lungs: Diffuse wheezing bilaterally. Normal excursions and effort. Abdomen: Soft, non-tender. Extremities: Without clubbing, cyanosis or edema. Peripheral pulses are 2+.   Skin: Warm

## 2019-01-21 NOTE — PROGRESS NOTES
[place] [facility] - [facility]    Progress Note    Zahra Razo Patient Status:  Inpatient    1940 MRN [id number]   Location [facility] 3W/SW Attending Fermín Wright, [address] [facility] Day # 4 PCP Arelis Contreras MD       Subjective: Results   Component Value Date    WBC 8.2 01/21/2019    HGB 9.0 (L) 01/21/2019    HCT 27.8 (L) 01/21/2019     (L) 01/21/2019    CREATSERUM 1.14 01/19/2019    BUN 33 (H) 01/19/2019     01/19/2019    K 4.6 01/19/2019     01/19/2019    CO2

## 2019-01-21 NOTE — CM/SW NOTE
01/21/19 1400   CM/SW Screening   Patient's Mental Status Alert;Oriented   Patient's [address]   Number of Levels in Home 1   Number of Stair in Home (1)   Patient lives with Spouse   Patient Status Prior to Admission   Independent with ADL

## 2019-01-21 NOTE — PAYOR COMM NOTE
--------------  DISCHARGE REVIEW    Rigo Carlton MA [facility]  Subscriber #:  [id number]  Authorization Number: [id number]    Admit date: 12/21/18  Admit time:  9273  Discharge Date: 12/26/2018  6:49 PM     Admitting Physician: Miguel Oliva MD  Attending Ph Abd: Abdomen soft,       HPI:   Per Dr. Roy Side:  History of Present Illness:     Mr. Rollins is a 65 yo M with PMH Of CAD s/p CABG, COPD on chronic prednisone, DM2, HTN, hypothyroidism who presented with shortness of breath.  Patient states symptoms have -cr bumped up to 1.8, held lasix->1.6-> 1.3  -no oral diuretics (continue PRN dosing per prior)     Elevated troponin/hx CAD   - troponin 0.06 on admit, EKG without ST changes  - trend troponin/EKG  - diuresed   - bnp ordered per cards and still elevated a predniSONE 20 MG Tabs  Commonly known as:  DELTASONE  Take 1 tablet (20 mg total) by mouth daily with breakfast.     Roflumilast 500 MCG Tabs     SYMBICORT 160-4.5 MCG/ACT Aero  Generic drug:  Budesonide-Formoterol Fumarate     Vitamin D3 61160 units Caps DilTIAZem HCl ER Coated Beads 300 MG Cp24  Commonly known as:  CARDIZEM CD  Take 1 capsule (300 mg total) by mouth daily.   Start taking on:  12/27/2018        CONTINUE taking these medications    * albuterol sulfate (2.5 MG/3ML) 0.083% Nebu  Commonly known Total Time Coordinating Care: Greater than 30 minutes    Patient had opportunity to ask questions and state understand and agree with therapeutic plan as outlined    Thank Rajeev Lin M.D.  [facility] Hospitalist  Pager       Electronically signed by Tiffanie Rodriguez

## 2019-01-21 NOTE — PLAN OF CARE
Inpatient Throughput Communication:    Called inpatient RN Vee Godinez and notified of scheduled procedure Liver Biopsy on today, 1/21/2019    Verified that appropriate consent is signed: Yes  Appropriate Consent Signed:  Yes  Access Site Hair Clipped and skin pr

## 2019-01-21 NOTE — PROGRESS NOTES
Start of biopsy. DNR status to remain intact per Dr. Cinthia Dance. Timeout completed. Sedation given.

## 2019-01-21 NOTE — PAYOR COMM NOTE
--------------  CONTINUED STAY REVIEW    Alfonso Hernandez MA [facility]  Subscriber #:  [id number]  Authorization Number: [id number]     Admit date: 1/17/19  Admit time: 1551 [address]    Admitting Physician: Olivier Carmichael DO  Attending Physician:  DO Edwin Posey Sender

## 2019-01-21 NOTE — PROCEDURES
[facility]  Procedure Note    1160 Raza Kevin Patient Status:  Inpatient    1940 MRN [id number]   Location [facility] Attending Jacquelyn Talbot, 1604 [facility] Day # 4 PCP Arelis Contreras MD

## 2019-01-21 NOTE — PRE-SEDATION ASSESSMENT
[place] [facility]  IR Pre-Procedure Sedation Assessment    History of snoring or sleep or apnea?    No    History of previous problems with anesthesia or sedation  No    Physical Findings:  Neck: nl ROM  CV: RRR  PULM: normal respiratory rate/effor

## 2019-01-21 NOTE — PROGRESS NOTES
[facility] AND [facility]  Gastroenterology Progress Note    Nolvia Razo Patient Status:  Inpatient    1940 MRN [id number]   Location [facility] 3W/SW Attending Chester Lopes, 1604 [facility] Day # 4 PCP MD Braeden Archibald acid level      Assessment/Plan:  C. difficile colitis: Continue vancomycin    GI bleed with negative upper endoscopy and CT scan showing colitis most likely due to C. difficile with a possible component of ischemic colitis.   Patient overall condition woul

## 2019-01-21 NOTE — PROGRESS NOTES
[facility]    Progress Note      Assessment and Plan:   1. Multiple pulmonary and hepatic lesions– the patient would benefit from biopsy and histologic characterization; however, he is at high risk for pneumothorax.   The patient underwent

## 2019-01-21 NOTE — PAYOR COMM NOTE
--------------  ADMISSION REVIEW     Violetta Parker MA [facility]  Subscriber #:  [id number]  Authorization Number: [id number]    Admit date: 12/21/18  Admit time: 1731       Admitting Physician: Reggie Loja MD  Attending Physician:  No att. providers found • High cholesterol    • Hyperlipidemia    • Pneumothorax     failed pleurodesis, s/p decortication       Past Surgical History:   Procedure Laterality Date   • OPEN HEART SURGICAL PROFILE             Social History    Tobacco Use      Smoking status: For leg: He exhibits edema. Neurological: He is alert and oriented to person, place, and time. Skin: Skin is warm, dry and intact. Psychiatric: He has a normal mood and affect.  His speech is normal and behavior is normal.   Nursing note and vitals review LIGHT GREEN   RAINBOW DRAW GOLD   RAINBOW DRAW LAVENDER TALL (BNP)   RESPIRATORY PANEL FLU EXPANDED     EKG    Rate, intervals and axes as noted on EKG Report. Rate: 85  Rhythm: Atrial Flutter  Reading: no stemi, interpreted by ed physician. the patient, ordering and reviewing laboratory tests, documenting, reviewing previous records, obtaining information from the family, and speaking with consultants, admitting doctors, nurses and medics and excludes any time spent on procedures. [facility]        A COPD exacerbation + CHF exacerbation, BNP elevated, +2 edema  - pulm and cards consulted, appreciate recs  - IV medrol 40 Q6hr per pulm, nebs Q6hr, IV diuresis per cards  - CXR without PNA  - RVP pending    Elevated troponin/hx CAD   - troponin 0.06 on adm PMH  Past Medical History:   Diagnosis Date   • CAD (coronary artery disease)     s/p CABG   • Cataract    • COPD (chronic obstructive pulmonary disease) ([facility]    • Coronary atherosclerosis    • Diabetes ([facility]    • Disorder of thyroid    • High blood BUN 42 12/21/2018     12/21/2018    K 5.2 12/21/2018    CL 98 12/21/2018    CO2 29 12/21/2018     12/21/2018    CA 8.6 12/21/2018    TROP 0.06 12/21/2018       Recent Labs   Lab  12/21/18   1431   TROP  0.06*       Additional Diagnostics: E Upper Arm) Erin Ramsey, RN      Insulin Aspart Pen (NOVOLOG) 100 UNIT/ML flexpen 1-7 Units     Date Action Dose Route User    1/21/2019 1315 Given 1 Units Subcutaneous (Right Upper Arm) Beth Lou, RN      insulin detemir (LEVEMIR) 100 UNIT

## 2019-01-22 NOTE — PROGRESS NOTES
[facility] AND [facility]  Gastroenterology Progress Note    Laurent Razo Patient Status:  Inpatient    1940 MRN [id number]   Location [facility] 3W/SW Attending Anthony Szymanski, 1604 [facility] Day # 5 PCP MD Jenn Archibald colitis: Continue vancomycin, to complete 14 days of therapy    GI bleed with negative upper endoscopy and CT scan showing colitis most likely due to C. difficile with a possible component of ischemic colitis (suspected because of the gross rectal bleeding

## 2019-01-22 NOTE — PHYSICAL THERAPY NOTE
PHYSICAL THERAPY TREATMENT NOTE - INPATIENT     Room Number: 839/053-Q       Presenting Problem: melena    Problem List  Principal Problem:    Melena  Active Problems:    Hyperglycemia    Elevated lactic acid level      PHYSICAL THERAPY ASSESSMENT   Mary Static Sitting: Good  Dynamic Sitting: Good           Static Standing: Fair +  Dynamic Standin Hiram Mcmahon 2 '6-Clicks' INPATIENT SHORT FORM - BASIC MOBILIT stairs/one curb w/ assistive device and supervision   Goal #4   Current Status Will be able to navigate 1 step when home   Goal #5 Patient to demonstrate independence with home activity/exercise instructions provided to patient in preparation for discharge

## 2019-01-22 NOTE — PROGRESS NOTES
[facility]    Progress Note    Lisa Razo Patient Status:  Inpatient    1940 MRN [id number]   Location [facility] 3W/SW Attending Edyta Shea, [address] [facility] Day # 5 PCP Arelis Contreras MD       Subjective: No erythema and no fluctuance. Neurologic: Alert and oriented, moving all 4 extremities without difficulty  Integument: Warm, dry, pink  Psychiatric: Appropriate mood and affect.       Results:     Lab Results   Component Value Date    WBC 8.2 01/21/2019 -Pulmonology consulted  -Pt had liver biopsy yesterday and we are awaiting the results.     Thrombocytopenia  -Unclear etiology, not on heparin, no other signs of TTP  -Continue to monitor    Resolved conditions:  Lactic acidosis  NSTEMI-Suspect secondary

## 2019-01-22 NOTE — PROGRESS NOTES
[facility]    Progress Note      Assessment and Plan:   1. Multiple pulmonary and hepatic lesions– the patient would benefit from biopsy and histologic characterization; however, he is at high risk for pneumothorax.   The patient underwent [facility]  Pager: [phone]

## 2019-01-22 NOTE — PROGRESS NOTES
[facility]    Progress Note    Jersonchiquita Razo Patient Status:  Inpatient    1940 MRN [id number]   Location [facility] 3W/SW Attending Anna Amezcua, [address] [facility] Day # 5 PCP Arelis Contreras MD       Assessment an kg)    Tele:  SR      Exam  Gen: No acute distress, alert and oriented x3, pale  Neck:supple,no JVD  Pulm: Lungs wheezes bilat, normal respiratory effort, using 02  CV: Heart with regular rate and rhythm, nl S1,S2 ,no murmur  Abd: Abdomen soft, nontender, lesion.       Dictated by (CST): Nino Birch MD on 1/21/2019 at 14:34     Approved by (CST): Nino Birch MD on 1/21/2019 at 14:36                  CONNER Randolph  1/22/2019

## 2019-01-23 NOTE — PROGRESS NOTES
[facility]  Gastroenterology Progress Note    Luda Anderson Danni Patient Status:  Inpatient    1940 MRN [id number]   Location [facility] 3W/SW Attending Boom Aguilar, [address] [facility] Day # 6 PCP MD Kaushal Archibald of any further assistance during this admission. My contact information was given to the family. Katharine Baeza MD [facility]  1/23/2019  12:29 PM

## 2019-01-23 NOTE — PROGRESS NOTES
[facility]  Progress Note    Miles Razo Patient Status:  Inpatient    1940 MRN [id number]   Location [facility] 3W/SW Attending Kailey Sanchez, [address] [facility] Day # 6 PCP Arelis Contreras MD     Assessment:    1.  LAKIA phipps apparent distress. HEENT: No focal deficits. Neck: No JVD, carotids 2+ no bruits. Cardiac: Regular rate and rhythm, S1, S2 normal, no murmur  Lungs: Clear without wheezes, rales, rhonchi. Normal excursions and effort. Abdomen: Soft, non-tender.    Extr

## 2019-01-23 NOTE — PROGRESS NOTES
[facility]    Progress Note    Israel Razo Patient Status:  Inpatient    1940 MRN [id number]   Location [facility] 3W/SW Attending Cande Horowitz, [address] [facility] Day # 6 PCP Arelis Contreras MD       Subjective: rhonchi or rales appreciated  Cardiovascular: Regular rate and rhythm.  No murmurs.  2+ pitting lower extremity edema bilaterally, left greater than right  Abdomen: Soft, nontender, nondistended.  Normal bowel sounds.    Neurologic: Alert and oriented, movi sugars     Thrombocytopenia  -Unclear etiology, not on heparin, no other signs of TTP  -Continue to monitor     Resolved conditions:  Lactic acidosis  NSTEMI-Suspect secondary to severe anemia     CODE STATUS: DNR      Brayan Astorga,   1/23/2019

## 2019-01-23 NOTE — PLAN OF CARE
GASTROINTESTINAL - ADULT    • Maintains or returns to baseline bowel function Not Progressing        METABOLIC/FLUID AND ELECTROLYTES - ADULT    • Glucose maintained within prescribed range Not Progressing        Patient/Family Goals    • Patient/Family Lo

## 2019-01-23 NOTE — PROGRESS NOTES
[facility] Pharmacy Note: Route Optimization for Pantoprazole (PROTONIX)    Patient is currently on Pantoprazole (PROTONIX) 40 mg IV every 24 hours.    The patient meets the criteria to convert to the oral equivalent as established by the IV to Oral conversion pro

## 2019-01-23 NOTE — PROGRESS NOTES
[facility]    Progress Note      Assessment and Plan:   1. Metastatic adenocarcinoma consistent with pulmonary primary    Recommendations: Await oncology opinion.     2.  End-stage COPD–the respiratory status is approximately baseline for

## 2019-01-23 NOTE — RESTORATIVE THERAPY
RESTORATIVE CARE TREATMENT NOTE    Presenting Problem  Presenting Problem: melena  Presenting Problem: Abdominal pain and dark stool       Precautions  Precautions: None       Weight Bearing Restriction  Weight Bearing Restriction: None                   S

## 2019-01-24 NOTE — PLAN OF CARE
Problem: Diabetes/Glucose Control  Goal: Glucose maintained within prescribed range  INTERVENTIONS:  - Monitor Blood Glucose as ordered  - Assess for signs and symptoms of hyperglycemia and hypoglycemia  - Administer ordered medications to maintain glucose indicated  - Evaluate effectiveness of antiarrhythmic and heart rate control medications as ordered  - Initiate emergency measures for life threatening arrhythmias  - Monitor electrolytes and administer replacement therapy as ordered  Outcome: Progressing SKIN/TISSUE INTEGRITY - ADULT  Goal: Skin integrity remains intact  INTERVENTIONS  - Assess and document risk factors for pressure ulcer development  - Assess and document skin integrity  - Monitor for areas of redness and/or skin breakdown  - Initiate int Provide timely, complete, and accurate information to patient/family  - Incorporate patient and family knowledge, values, beliefs, and cultural backgrounds into the planning and delivery of care  - Encourage patient/family to participate in care and decisi

## 2019-01-24 NOTE — CONSULTS
[facility]    PATIENT'S NAME: Wes Valverde   ATTENDING PHYSICIAN: Pepe Holden DO   CONSULTING PHYSICIAN: Bryan Green.  MD Jony   PATIENT ACCOUNT#:   [id number]    LOCATION:  [address] [place] [address] #:   [id number]       DATE OF BIRTH:  0 malignancy. SOCIAL HISTORY:  He is a former smoker, quitting in 200. He denies any alcohol or illicit drug use. REVIEW OF SYSTEMS:  Otherwise all review of systems negative x12. PHYSICAL EXAMINATION:    GENERAL:  No acute distress.   Tom request.  We will see him back in clinic as noted above in 1 week. Dictated By Mimi Chaudhari MD  d: 01/23/2019 17:22:47  t: 01/23/2019 19:39:08  [facility] [id number]  QJB/    cc: Apolonia West MD

## 2019-01-24 NOTE — PAYOR COMM NOTE
--------------  CONTINUED STAY REVIEW    [facility]  Subscriber #:  [id number]  Authorization Number: [id number]     Admit date: 1/17/19  Admit time: 1551 [address]    Admitting Physician: Jayant Stout DO  Attending Physician:  DO Juan M Doe Upper Arm) Brady Villaseñor RN    1/23/2019 1401 Given 20 Units Subcutaneous (Right Upper Abdomen) Brady Villaseñor RN    1/23/2019 8531 Given 4 Units Subcutaneous (Left Upper Abdomen) Brady Villaseñor RN      Insulin Aspart Pen (NOVOLOG) 100 U history of abdominal pain, diarrhea, and dark stools.  Patient was found to be anemic with a hemoglobin of 8.4 initially, followed by drop in hemoglobin as low as 6.2.  Patient has received 2 units PRBC since admission.  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 fib.     1/23 GI NOTE    Assessment/Plan:  C. difficile. Given his overall condition and new diagnosis of cancer, will complete 14 days of vancomycin.   If diarrhea hepatic lesions concerning for metastatic disease. Biopsy was obtained as noted.      The patient denies any focal neurological deficits. He feels that his breathing is at his baseline. He is on oxygen by nasal cannula at baseline.   He denies any adenop Iron saturation is 14%.     IMAGING:  Reviewed as per HPI, please see above for details.       ASSESSMENT AND PLAN:  The patient is a pleasant 70-year-old male with history of multiple medical problems, including coronary artery disease, chronic obstructiv

## 2019-01-24 NOTE — DISCHARGE SUMMARY
[facility]    Discharge Summary    [address] Patient Status:  Inpatient    1940 MRN [id number]   Location [facility] 3W/SW Attending Bruce Gil, 160[address] [facility] Day # 7 PCP Arelis Contreras MD     Date of Adm present state.  On initial CT scan of the abdomen in the ED, multiple lung nodules were incidentally found.  This was followed with CT chest, revealing multiple nodules not seen on prior CTs, as well as 2 liver nodules.   Patient underwent IR liver biopsy o Tab  Take 2 tablets (5 mg total) by mouth nightly.       Home Meds - Unchanged    Pantoprazole Sodium 40 MG Oral Tab EC  Take 40 mg by mouth every morning before breakfast.    DilTIAZem HCl ER Coated Beads 300 MG Oral Capsule SR 24 Hr  Take 1 capsule (300 m hospital discharge. Contact information:  [address] [place] [id number] [phone]             Elmo Tsang MD In 1 week.     Specialties:  Hematology and Oncology, ONCOLOGY, HEMATOLOGY  Why:  follow up  Contact information:  Krista

## 2019-01-24 NOTE — TRANSITION NOTE
59-year-old male who presented to the emergency room at [facility] on the evening of 1/16/2019 complaining of abdominal pain and several days of dark bloody stools. An upper GI endoscopy did not reveal a source.   Colonoscopy was withheld because of A. fib burden.

## 2019-01-24 NOTE — PROGRESS NOTES
[facility]    Progress Note      Assessment and Plan:   1. Metastatic adenocarcinoma consistent with pulmonary primary– the patient may yet be a candidate for therapies depending upon the tumor markers.     Recommendations: Await tumor ma [phone]

## 2019-01-25 NOTE — PAYOR COMM NOTE
--------------  DISCHARGE REVIEW    Yuli Tapia MA [facility]  Subscriber #:  [id number]  Authorization Number: [id number]     Admit date: 1/17/19  Admit time:  0035  Discharge Date: 1/24/2019  2:52 PM     Admitting Physician: DO Danitza Casey Physi and affect      Hospital Course: Shae Razo is a 70-year-old man with past medical history including chronic respiratory failure secondary to COPD, CAD, DM 2, and A. fib (on Eliquis), who presented to the ED on the evening of 1/16/19 for 4-day histor NON-SMALL CELL LUNG CARCINOMA (NSCLC)  In process    BRAF MOLECULAR  In process    EGFR TYROSINE KINASE INHIBITOR (TKI) ELIGIBILITY ASSAY  In process    PD-L1 22C-3 In process    ROS1 FISH  In process          Discharge Plan:   Discharge Condition: Stable (SYNTHROID) 50 MCG Oral Tab  Take 50 mcg by mouth before breakfast.      Atorvastatin Calcium (LIPITOR) 80 MG Oral Tab  80 mg nightly. Roflumilast 500 MCG Oral Tab  Take 250 mcg by mouth daily.       Elastic Bandages & Supports (JOBST ACTIVE 15-20MMHG

## 2019-01-28 NOTE — TELEPHONE ENCOUNTER
Returned call to son, answered any question I could to best of my ability and encouraged Fernando Willis to attend MD appt next week.

## 2019-01-28 NOTE — TELEPHONE ENCOUNTER
Redell Jeans called wondering if the markings had came in on his fathers biopsy. He was looking to get an update as to those. If there is an update he would like to be contacted at [phone].  Please advise

## 2019-01-28 NOTE — TELEPHONE ENCOUNTER
Flaca Jackson for MRI scheduled for 2/4/19 at 330 pm with arrival at 300 pm to [place] parking lot to [address] Dr Conner Alvarez.  With followup MD appt 2/6 at 130 pm with Dr Latasha Khan

## 2019-02-04 NOTE — CDS QUERY
.Clarification – Type 2 MI (AMI or Myocardial Infarction)  CLINICAL DOCUMENTATION CLARIFICATION FORM  Dear Doctor: Desmond De La Garza information (provided below) includes a diagnosis of Type 2 MI, AMI or myocardial infarction.  For accurate ICD-10-CM code a

## 2019-02-06 NOTE — TELEPHONE ENCOUNTER
Called IR and notified them of Dr Tristan Phillips order for port placement, they will contact patient

## 2019-02-06 NOTE — PROGRESS NOTES
Cancer Center Progress Note    Patient Name: Devorah Medina   YOB: 1940   Medical Record Number: [id number]   Attending Physician: Loretta Worrell M.D.      Chief Complaint:  Lung cancer, liver metastasis    History of Present Illness:  Cancer Transportation needs - non-medical: Not on file    Occupational History      Not on file    Tobacco Use      Smoking status: Former Smoker      Smokeless tobacco: Never Used    Substance and Sexual Activity      Alcohol use: No      Drug use: No      Sexu 108 (90 BASE) MCG/ACT Inhalation Aero Soln, Inhale 2 puffs into the lungs every 4 (four) hours as needed for Wheezing., Disp: , Rfl:   •  Budesonide-Formoterol Fumarate (SYMBICORT) 160-4.5 MCG/ACT Inhalation Aerosol, Inhale 2 puffs into the lungs 2 (two) t AST 26 01/16/2019    ALT 18 01/16/2019    BILT 0.7 01/16/2019    TP 5.5 (L) 01/16/2019    ALB 2.8 (L) 01/16/2019    GLOBULIN 3.0 01/14/2017     01/19/2019    K 4.6 01/19/2019     01/19/2019    CO2 33 (H) 01/19/2019       Radiology:  Matty Medrano

## 2019-02-07 NOTE — TELEPHONE ENCOUNTER
Dr. Gladys Rodarte, patient's son calling for update on coverage for Trilogy Ventilator. I spoke with Meeta Raines and she said that you were concerned that it may not be covered for the patient? Will you proceed with ordering the device?  Per TE from 1/10/19 order for

## 2019-02-07 NOTE — TELEPHONE ENCOUNTER
Son calling to f/up on prior auth to get pt. machine for him to use at night time. Son is not sure of name of machine.

## 2019-02-08 NOTE — TELEPHONE ENCOUNTER
RN, I spoke at length with the son Justyna Varela. The patient's PCO2 is 51. The typical criteria and is to have a PCO2 of 55. The patient likely will not qualify. Keep the forearm. We can fill out and submit, but I am not hopeful.

## 2019-02-12 PROBLEM — Z45.2 ENCOUNTER FOR CARE RELATED TO VASCULAR ACCESS PORT: Status: ACTIVE | Noted: 2019-01-01

## 2019-02-12 NOTE — TELEPHONE ENCOUNTER
Luis Eduardo Sadler calling asking about dads treatment schedule and education. I did explain to him we generally have to get authorization from ins prior to starting treatment.  alfonso

## 2019-02-12 NOTE — TELEPHONE ENCOUNTER
Order completed by Dr. Nancy Gotti. Order faxed to [address] [place] [address] [phone]. Fax confirmation received. Pt informed of this and given [employer] phone # [phone]. Pt informed if he does not hear from [address] [place] [address] within a few business days to call [address] [place] [address] to check status.   P

## 2019-02-13 NOTE — ANESTHESIA PROCEDURE NOTES
Airway  Urgency: elective    Airway not difficult    General Information and Staff    Patient location during procedure: OR  Anesthesiologist: Arpit Alexandra MD  Resident/CRNA: Milagro Carvalho CRNA  Performed: anesthesiologist and CRNA     Lillian

## 2019-02-13 NOTE — PROCEDURES
[facility]  Procedure Note    Zahra Razo Patient Status:  Outpatient in a Bed    1940 MRN [id number]   Location [facility] Attending Sammi Edwards MD   [address] [facility] Day # 0 PCP Arelis Contreras,

## 2019-02-13 NOTE — PROGRESS NOTES
[address]  [id number]  2/13/2019    Pt is able to sit up and ambulate without difficulty. Pt voided and tolerated fluids/food. Pt's vital signs are stable. Procedural site remains dry and intact with  no signs and symptoms of bleeding noted.  Instruc

## 2019-02-13 NOTE — TELEPHONE ENCOUNTER
Kiran called back. I updated him that the treatment authorization is still pending. He is anxious to get the education scheduled. Scheduled labs and pt education for 2/15.   He is aware that first treatment won't be scheduled until authorization is receiv

## 2019-02-13 NOTE — ANESTHESIA POSTPROCEDURE EVALUATION
Patient: Rani Razo    Procedure Summary     Date:  02/13/19 Room / Location:  [facility]    Anesthesia Start:  0730 Anesthesia Stop:      Procedure:  IR PORT A CATH PROCEDURE Diagnosis:       Primary malignant neoplasm o

## 2019-02-13 NOTE — INTERVAL H&P NOTE
The above referenced H&P was reviewed by Ned Molina MD on 2/13/2019, the patient was examined and no significant changes have occurred in the patient's condition since the H&P was performed.   Risks, benefits, alternative treatments and consequences

## 2019-02-13 NOTE — TELEPHONE ENCOUNTER
Called Kiran back, he says \"My dad got a call yesterday that chemo is authorized- can we schedule the education? \"  I do not see that chemo is authed.   I told Gela Yeung that from what I can see, the [place] [employer] is still pending, but that I would check with our insuran

## 2019-02-13 NOTE — ANESTHESIA PREPROCEDURE EVALUATION
Anesthesia PreOp Note    HPI:     Antonette Van is a 66year old male who presents for preoperative consultation requested by: * No surgeons listed *    Date of Surgery: 2/13/2019    * No procedures listed *  Indication: * No pre-op diagnosis entered * 12/27/2016      COPD (chronic obstructive pulmonary disease) ([facility] [address]         Date Noted: 03/17/2015        Past Medical History:   Diagnosis Date   • CAD (coronary artery disease)     s/p CABG   • Cataract    • COPD (chronic obstructive pulmonary disease) (H Budesonide-Formoterol Fumarate (SYMBICORT) 160-4.5 MCG/ACT Inhalation Aerosol Inhale 2 puffs into the lungs 2 (two) times daily. Disp:  Rfl:  2/12/2019 at Unknown time   lisinopril 5 MG Oral Tab Take 5 mg by mouth daily.  Disp:  Rfl:  2/12/2019 at Unknown 02/13/19 0737 100 mcg/kg/min at 02/13/19 0737   DEXMEDETOMIDINE BOLUS FROM BAG infusion   PRN Antwan Rice Fraction, CRNA 8 mcg at 02/13/19 8608      No current [facility]-ordered outpatient medications on file.     No Known Allergies    No family history on fi Pulse:  80   Resp:  17   Temp:  98.1 °F (36.7 °C)   TempSrc:  Oral   SpO2:  99%   Weight: 70.3 kg (155 lb)         Anesthesia ROS/Med Hx and Physical Exam     Patient summary reviewed and Nursing notes reviewed    No history of anesthetic complications

## 2019-02-15 NOTE — PATIENT INSTRUCTIONS
Medication Education Record: IV Therapy    Learner:  Patient, Spouse and Family Member    Barriers / Limitations:  None    Diagnosis:   Lung cancer    IV Cancer Treatment Name(s): Pemetrexed, Carboplatin and Pembrolizumab  IV Cancer Treatment Frequency Onc minimize this from occurring.     Recommended Anti-nausea medications (as directed by your provider):  Prochloperazine (Compazine) 10 mg every 6 hours and Ondansetron (Zofran) 8 mg every 8 hours  Take as needed    Other drug specific:   Steroid prep: Dexame Maximum of 8 tablets in 24 hours. MAY NEED STEROID TO CONTROL.   o Constipation:  Over-the-counter recommendations include Senokot, Ducolax, Milk of Magnesia or MiraLAX  o If you have persistent diarrhea or constipation, please contact the triage nurse for Oncologist Dr. Yoli Mascorro [phone]     Teaching Materials Provided:   [employer] [facility] chemotherapy information sheets  and Safety and Handling Sheet     Please refer to the following link if you are interested in additional i 4. Sexual activity is safe while throughout treatment. It is possible that traces of the oral chemotherapy may be present in vaginal fluid or semen for 48 hours after taking. A condom should be used during this time.   Effective birth control should be

## 2019-02-15 NOTE — PROGRESS NOTES
Medication Education Record: IV Therapy    Learner:  Patient, Spouse and Family Member    Barriers / Limitations:  None    Diagnosis:   Lung cancer    IV Cancer Treatment Name(s): Pemetrexed, Carboplatin and Pembrolizumab  IV Cancer Treatment Frequency Onc minimize this from occurring.     Recommended Anti-nausea medications (as directed by your provider):  Prochloperazine (Compazine) 10 mg every 6 hours and Ondansetron (Zofran) 8 mg every 8 hours  Take as needed    Other drug specific:   Steroid prep: Dexame Maximum of 8 tablets in 24 hours. MAY NEED STEROID TO CONTROL.   o Constipation:  Over-the-counter recommendations include Senokot, Ducolax, Milk of Magnesia or MiraLAX  o If you have persistent diarrhea or constipation, please contact the triage nurse for Oncologist Dr. Rupa Bryant [phone]     Teaching Materials Provided:   [employer] chemotherapy information sheets  and Safety and Handling Sheet     Please refer to the following link if you are interested in additional i your body fluids after you have treatment. 4. Sexual activity is safe while throughout treatment. It is possible that traces of the oral chemotherapy may be present in vaginal fluid or semen for 48 hours after taking.   A condom should be used during [facility] [place]

## 2019-02-15 NOTE — TELEPHONE ENCOUNTER
Hermelindo Mercado from [employer] [place] [employer] states pt does qualify for Trilogy but addendum needs to be made to Dr. Valeria Gary note stating \" the chronic respiratory failure is secondary/due to the COPD. \"    Hermelindo Mercado dropped off Trilogy form requesting additional settings be filled out.   Fo

## 2019-02-19 NOTE — PATIENT INSTRUCTIONS
Cancer Treatment Side Effects (refer to Darwinyordy Mccormack 7287 for further information):   Allergic reactions  Constipation  Diarrhea  Eye disorders  Fatigue  Hair loss  Heart effects  Hormone gland changes involving the thyroid, pituitary, adrenal and pancrea you go. Any fluid is acceptable, but caffeinated products do not count towards your intake and should be limited to 1-2 drinks/day.   Physical Activity     · If your doctor approves, be as physically active as you can, but start out slowly, and increase yo of dehydration: tiredness, thirst, dry mouth, dark and decreased amount of urine  · Diarrhea – not controlled with Imodium AD or more than 6 episodes in 24 hours  · Constipation –no bowel movement x 3 days, no response to stool softeners or laxatives  · Mo regular laundry detergent. Do not wash soiled garments with hands. If the soiled garments cannot be washed right away, place them in a sealed plastic bag until they can be washed.    3. Absorbable undergarments, or any other items contaminated with chemot

## 2019-02-19 NOTE — PROGRESS NOTES
Cancer Center Progress Note    Patient Name: Kiersten Subramanian   YOB: 1940   Medical Record Number: [id number]   Attending Physician: Kirk Moss M.D.      Chief Complaint:  Lung cancer, liver metastasis    History of Present Illness:  Cancer Medical: Not on file        Non-medical: Not on file    Tobacco Use      Smoking status: Former Smoker      Smokeless tobacco: Never Used    Substance and Sexual Activity      Alcohol use: No      Drug use: No      Sexual activity: Not on file    Lifestyle 2 (two) times daily with meals. , Disp: 30 tablet, Rfl: 0  •  Pantoprazole Sodium 40 MG Oral Tab EC, Take 40 mg by mouth every morning before breakfast., Disp: , Rfl:   •  DilTIAZem HCl ER Coated Beads 300 MG Oral Capsule SR 24 Hr, Take 1 capsule (300 mg to Allergies:  No Known Allergies     Review of Systems:  All other systems reviewed and negative x12    Vital Signs: There were no vitals taken for this visit. Physical Examination:  General: Patient is alert and oriented x 3, not in acute distress. resources were discussed. Appropriate resources were reviewed and discussed with the pateint and family.      Anne Santiago MD

## 2019-02-19 NOTE — PROGRESS NOTES
Pt here for 1 D 1  Keytruda , Carbo, B 12 injection        Arrives Via wheelchair, accompanied by Spouse and Family member           Modifications in dose or schedule: Yes Alimta to be held for Cycle 1 only due to Kidney function.  1 Liter Hydration adminis effects  Loss of appetite  Low red blood cell count / Anemia  Low White Blood Cell Count/Risk of infection  Low Platelet Count/Risk of Bleeding  Lung Effects  Mouth or Throat Sores  Muscle / Bone Effects  Nausea / Vomiting  Nerve Effects  Skin Effects  Tas stronger. Listen to your body and rest when you need to. Do what you can when you feel up to it.       Oral Care  Keep your mouth clean.   Rinse your mouth before and after meals with plain water or with a mild mouth rinse (made with 1 quart water, 1 teas on the lips affecting oral intake  · Difficulty breathing, chest pain, or new cough  · Excessive tiredness or weakness, confusion or loss of balance  · New rash  · Tingling or burning, redness, swelling of the palms of hands or soles of feet  · Sudden new plastic bag for disposal, separate from other trash. 4. Toilets should be flushed twice with the lid closed while taking this medication and for 48 hours after the last dose. 5. Wash your hands after using the toilet.   6. Wash any skin area that has come

## 2019-02-19 NOTE — PROGRESS NOTES
Spoke with Joselo Méndez RN regarding C1D1 Pem/Pem/Carbo. RN confirmed the following information with GENE Boo: Saulo Safer will be held for C1D1 ONLY and will be replaced with hydration today (1L NS 0.9%).  Since Alimta is held for C1D1, AUC for Carbo was adjusted to

## 2019-02-20 NOTE — TELEPHONE ENCOUNTER
Dr. Adam Martinez completed order form. Order form and OV note faxed to [facility] dept at [id number] [facility]. Fax confirmation received. Order sent to HIM form scanning.

## 2019-03-11 NOTE — PROGRESS NOTES
The patient is a 25-year-old male who I know well from prior evaluation comes in now for follow-up. He has metastatic adenocarcinoma the lung and is getting chemotherapy and immune therapy and has tolerated acceptably to date.   He has end-stage COPD but h

## 2019-03-11 NOTE — PROGRESS NOTES
Cancer Center Progress Note    Patient Name: Byron Fink   YOB: 1940   Medical Record Number: [id number]   Attending Physician: Alma Delia Jenkins M.D.      Chief Complaint:  Lung cancer, liver metastasis    History of Present Illness:  Cancer resource strain: Not on file      Food insecurity:        Worry: Not on file        Inability: Not on file      Transportation needs:        Medical: Not on file        Non-medical: Not on file    Tobacco Use      Smoking status: Former Smoker      Smokele 2 tablets (5 mg total) by mouth nightly., Disp: 30 tablet, Rfl: 0  •  dronedarone HCl 400 MG Oral Tab, Take 1 tablet (400 mg total) by mouth 2 (two) times daily with meals. , Disp: 30 tablet, Rfl: 0  •  Pantoprazole Sodium 40 MG Oral Tab EC, Take 40 mg by m MG Oral Tab, 80 mg nightly.  , Disp: , Rfl: 1  •  Roflumilast 500 MCG Oral Tab, Take 250 mcg by mouth daily.   , Disp: , Rfl:     Allergies:  No Known Allergies     Review of Systems:  All other systems reviewed and negative x12    Vital Signs:  /55 ( carboplatin pemetrexed and pembolizumab however is not been able to receive pemetrexed due to renal function  –Due for cycle #2 of treatment will plan to proceed with carboplatin pemetrexed and pembolizumab function has improved in this (2nd) cycle he will

## 2019-03-12 NOTE — PROGRESS NOTES
Pt here for C2D1 Carbo, Alimta, and Keytruda. Arrives Via wheelchair, accompanied by Family member, daughter         Modifications in dose or schedule: Yes, patient will be getting Alimta today (b12 given on 2/19/19).      Patient reports he is feeling wel

## 2019-03-18 NOTE — TELEPHONE ENCOUNTER
Received a voicemail from [place]. Called him back, he reports that his dad has been having frequent urination, even at night- and not sleeping well as a result. He is concerned about this. I told him I would call his dad.     Jaimie Mcduffie, he reports he i

## 2019-03-22 NOTE — TELEPHONE ENCOUNTER
Spoke with Kristofer Venegas regarding referral to Urologitst Dr Reba Daniel # [phone] they will call to set up appt.

## 2019-04-01 PROBLEM — I50.9 ACUTE CONGESTIVE HEART FAILURE, UNSPECIFIED HEART FAILURE TYPE (HCC): Status: ACTIVE | Noted: 2019-01-01

## 2019-04-01 PROBLEM — D64.9 ANEMIA, UNSPECIFIED TYPE: Status: ACTIVE | Noted: 2019-01-01

## 2019-04-01 NOTE — ED PROVIDER NOTES
Patient Seen in: [facility] AND [facility] Emergency Department    History   Patient presents with:  Arrythmia/Palpitations (cardiovascular)    Stated Complaint: afib rvr, hypoxic, anemic (lung ca pt), sent by Dr. Micky Crowley    HPI    35-year-old male presents for com by Dr. Bri Bazan  Other systems are as noted in HPI. Constitutional and vital signs reviewed. All other systems reviewed and negative except as noted above.     Physical Exam     ED Triage Vitals [04/01/19 1224]   /62   Pulse (!) 122   Resp 22   Temp Ovalocytes 2+ (*)     All other components within normal limits   PRO BETA NATRIURETIC PEPTIDE - Abnormal; Notable for the following components:    Pro-Beta Natriuretic Peptide 5,407 (*)     All other components within normal limits   CBC W/ DIFFERENTIAL - 6.3.  A unit will be transfused. Patient also with findings of CHF on exam.  He is given some Lasix. Chest x-ray is clear. Oncology has been consulted. Cardiology has been consulted.     Imaging:   Xr Chest Ap Portable  (cpt=71045)    Result Date: 4/1/2 screening including reassessment of your blood pressure.     Medications Prescribed:  Current Discharge Medication List        Present on Admission  Date Reviewed: 3/11/2019          ICD-10-CM Noted POA    Anemia D64.9 1/4/2017 Unknown

## 2019-04-01 NOTE — H&P
[facility]    PATIENT'S NAME: Elba Palacio   ATTENDING PHYSICIAN: Colten Huerta MD   PATIENT ACCOUNT#:   [id number]    LOCATION:  [place] [address]  MEDICAL RECORD #:   [id number]       YOB: 1940  ADMISSION DATE:       04/01 surgery. ALLERGIES:  No known drug allergies. FAMILY HISTORY:  Brother had coronary artery disease and cancer. SOCIAL HISTORY:  Ex-tobacco user. No current tobacco, alcohol, or drug use. Lives with his family. Home oxygen.   Sedentary lifestyl fluid overload status, considering his low blood pressure and high BUN and creatinine ratio. 4.   Lung cancer, as outlined above. 5.   Diabetes mellitus type 2. PLAN:  Patient be admitted to telemetry floor.   He will get 1 unit of blood transfusi

## 2019-04-01 NOTE — ED INITIAL ASSESSMENT (HPI)
Denver Neighbors is here for eval of dyspnea, tachycardia, and hypoxia.   Sent from Cancer center for Hg of 6.7

## 2019-04-01 NOTE — PROGRESS NOTES
Cancer Center Progress Note    Patient Name: Latha Anglin   YOB: 1940   Medical Record Number: [id number]   Attending Physician: Lei Stafford M.D.      Chief Complaint:  Lung cancer, liver metastasis    History of Present Illness:  Cancer file      Food insecurity:        Worry: Not on file        Inability: Not on file      Transportation needs:        Medical: Not on file        Non-medical: Not on file    Tobacco Use      Smoking status: Former Smoker      Smokeless tobacco: Never Used by mouth nightly., Disp: 30 tablet, Rfl: 0  •  dronedarone HCl 400 MG Oral Tab, Take 1 tablet (400 mg total) by mouth 2 (two) times daily with meals. , Disp: 30 tablet, Rfl: 0  •  Pantoprazole Sodium 40 MG Oral Tab EC, Take 40 mg by mouth every morning befo nightly.  , Disp: , Rfl: 1  •  Roflumilast 500 MCG Oral Tab, Take 250 mcg by mouth daily.   , Disp: , Rfl:     Allergies:  No Known Allergies     Review of Systems:  All other systems reviewed and negative x12    Vital Signs:  /46 (BP Location: Right 2019  –Due for cycle #3 of treatment however worsening tachycardia fatigue anemia. Will need red cell transfusion.     –History of C. difficile colitis completed course of vancomycin if worsening diarrhea will need to recheck stool  –Anemia in part from ch

## 2019-04-01 NOTE — CONSULTS
Cardiology (consult dictated)    Assessment:  1. Progressive weakness and shortness of breath. Found to be anemic. 2.  Episodes of chest pain associated with rapid heartbeat. History of CAD and remote bypass surgery.   LV systolic function was normal

## 2019-04-02 NOTE — CONSULTS
[facility]    PATIENT'S NAME: Kaelyn Hills   ATTENDING PHYSICIAN: Matty García MD   CONSULTING PHYSICIAN: Janelle Ojeda.  Rickie Osborn MD   PATIENT ACCOUNT#:   [id number]    LOCATION:  [address] [place] [address] #:   [id number]       DATE OF BIRTH: bedtime. ALLERGIES:  None known. SOCIAL HISTORY:  Smoked over a pack a day but quit in 1990. No alcohol. Occasional caffeine. He is retired from [employer].  with 2 children.       REVIEW OF SYSTEMS:  Ten-point review of system persists, we will need to stop the Multaq. For now, we will attempt rate control with IV diltiazem. Continue Bumex, but we will hold apixaban. Thank you for this consultation. Dictated By Sherry Mckeon.  Emilio Baig MD  d: 04/01/2019 17:37:08  t: 04/01/2019 19

## 2019-04-02 NOTE — PLAN OF CARE
Problem: CARDIOVASCULAR - ADULT  Goal: Maintains optimal cardiac output and hemodynamic stability  INTERVENTIONS:  - Monitor vital signs, rhythm, and trends  - Monitor for bleeding, hypotension and signs of decreased cardiac output  - Evaluate effectivenes O2 Sat 98% on 3.5L NC

## 2019-04-02 NOTE — CONSULTS
Inpatient oncology consultation    Date of consultation 4/2/2019  Requesting physician Lydia Mitchell    Chief Complaint:  Lung cancer, liver metastasis    History of Present Illness:  Cancer history:  77-year-old male former smoker with COPD chron level: Not on file    Occupational History      Not on file    Social Needs      Financial resource strain: Not on file      Food insecurity:        Worry: Not on file        Inability: Not on file      Transportation needs:        Medical: Not on file breathing  Cardiovascular: Regular with palpable distal pulses   Abdomen: Soft, non tender. Extremities: No edema. Neurological: 5/5 motor x4.         Laboratory:  Recent Labs   Lab  04/02/19   0522   WBC  12.2*   HGB  7.1*   PLT  173.0   NEUT  65

## 2019-04-02 NOTE — PLAN OF CARE
METABOLIC/FLUID AND ELECTROLYTES - ADULT    • Electrolytes maintained within normal limits Progressing        Patient Centered Care    • Patient preferences are identified and integrated in the patient's plan of care Progressing        Patient/Family Goals

## 2019-04-02 NOTE — PROGRESS NOTES
[facility] Dosing Service  Antibiotic Dosing    Kaykay Barragan is a 66year old male for whom pharmacy is dosing Zosyn for treatment of  intra-abdominal infection. Allergies: has No Known Allergies.     Vitals: BP (!) 87/47 (BP Location: Right arm)

## 2019-04-02 NOTE — PROGRESS NOTES
[facility] AND CLINICS  Progress Note    Abdelrahman Razo Patient Status:  Inpatient    1940 MRN [id number]   Location [facility] 3W/SW Attending Aretha Mcqueen MD   Hosp Day # 1 PCP Arelis Contreras MD     Assessment:    1.   Progres filed at 4/2/2019 3926  Gross per 24 hour   Intake 2072 ml   Output 450 ml   Net 1622 ml       Wt Readings from Last 2 Encounters:  04/02/19 : 160 lb 4.8 oz (72.7 kg)  03/11/19 : 154 lb (69.9 kg)      Physical Exam:    General: Alert and oriented x 3,  No Norma Turner MD  4/2/2019  8:50 AM

## 2019-04-02 NOTE — PROGRESS NOTES
[facility]  Hospitalist Progress Note     Luda Razo Patient Status:  Inpatient    1940  66year old [facility] [id number]   Location 343/343-A Attending Sameera Galicia MD   Hosp Day # 1 PCP Arelis Contreras MD     ASSESSME supple, no carotid bruit. CV: Irregular, S1S2, and intact distal pulses. No gallop, rub, murmur. Pulm: Effort and breath sounds normal. No distress, wheezes, rales, rhonchi. Abd: Soft, NTND, BS normal, no mass, no HSM, no rebound/guarding.    Neuro: Norm Insulin Aspart Pen  1-5 Units Subcutaneous TID CC   • Insulin Aspart Pen  6 Units Subcutaneous TID CC   • apixaban  5 mg Oral BID   • albuterol sulfate  2.5 mg Nebulization QID     Normal Saline Flush, acetaminophen, ondansetron HCl, albuterol sulfate, Alb

## 2019-04-03 NOTE — PAYOR COMM NOTE
O2 SAT 87% R. A.   TRANSFUSED 1 UNIT PRBC'S    ADMISSION REVIEW     Vignesh Olguin MA [facility]  Subscriber #:  [id number]  Authorization Number: [id number]    Admit date: 4/1/19  Admit time: 12       Admitting Physician: Deven Schulte MD  Attending Physician: Luis Daniel Mart MD at [facility] ENDOSCOPY   • OPEN HEART SURGICAL PROFILE             Social History    Tobacco Use      Smoking status: Former Smoker      Smokeless tobacco: Never Used    Alcohol use: No    Drug use: No      Review of Systems   Constitutional: has a normal mood and affect. His speech is normal and behavior is normal.   Nursing note and vitals reviewed.             ED Course     Labs Reviewed   BASIC METABOLIC PANEL (8) - Abnormal; Notable for the following components:       Result Value    Glucos ---------                               -----------         ------                     82 Andria Willard (BLOOD [facility])[[id number]]                               Final result               ANTIBODY UUXLVP[[id number]]                                  Final result patient, ordering and reviewing laboratory tests, documenting, reviewing previous records, obtaining information from the family, and speaking with consultants, admitting doctors, nurses and medics and excludes any time spent on procedures.         Admissio cell count of 11.1, hemoglobin of 6.3, platelet count of 588. There was a left shift and glucose 252, potassium 5.4, BUN 65 and creatinine 1.16, calculated osmolarity of 323. Patient had recent albumin level of 2.3, 2.5.   Venous Doppler study of lower ex blood pressure 116/50, pulse ox 87% on room air, 97% on 4L nasal cannula. HEENT:  Atraumatic. Oropharynx clear. Dry mucous membranes. Normal hard and soft palate. Eyes: Anicteric sclerae. Pupils equal, round, reactive to light and accommodation.    Earnstine Beverage Michael Luis MD on 4/1/2019  7:56 PM         MEDICATIONS ADMINISTERED IN LAST 1 DAY:  acetaminophen (TYLENOL) tab 650 mg     Date Action Dose Route User    4/2/2019 1854 Given 650 mg Oral Efraín Stringer, RN      albuterol sulfate (VENTOLIN) (2.5 MG/3ML) 0.083% Casey Pressley RN      Insulin Aspart Pen (NOVOLOG) 100 UNIT/ML flexpen 6 Units     Date Action Dose Route User    4/2/2019 1824 Given 6 Units Subcutaneous (Right Upper Arm) Casey Pressley RN    4/2/2019 1258 Given 6 Units Subcutaneous (Right Upper Arm [facility]) 50 MG/ML oral solution 125 mg     Date Action Dose Route User    4/2/2019 2118 Given 125 mg Oral Santy Wheeler [facility]        4/1 CARDIOLOGY CONSULT NOTE  ADMISSION DATE:       04/01/2019      CONSULT DATE:  04/01/2019     REPORT OF CONSULTAT bands.     Chest x-ray showed diffuse interstitial markings. No CHF.       EKG shows atrial flutter with a ventricular rate of 120 and an incomplete right bundle branch block; nonspecific ST and T-wave abnormality.   Compared to the last EKG of January 17, subtherapeutic doses of Eliquis. His ability to take anticoagulation long-term is uncertain. For these reasons I would not attempt even a RHONDA guided cardioversion at this time.           Subjective:  Dyspneic.   No chest pain.     Objective:  /59 (B of 1 unit. Nursing discussed with oncology today, no further transfusion planned for today. -CBC tomorrow  -Transfuse to keep hemoglobin greater than 7     Rapid atrial fibrillation/flutter.   Better on diltiazem drip.  -Cardizem drip  -Oral beta-blocker smoker with COPD chronically on oxygen with metastatic adenocarcinoma of the lung with liver metastasis PD-L1 0% EGFR ROS 1 ALK BRAF negative. He was diagnosed at the end of January 2019. Tissue was obtained via liver biopsy.    –Started carboplatin pemet

## 2019-04-03 NOTE — OCCUPATIONAL THERAPY NOTE
OCCUPATIONAL THERAPY EVALUATION - INPATIENT     Room Number: 343/343-A  Evaluation Date: 4/3/2019  Type of Evaluation: Initial  Presenting Problem: hypoxic, tachy; lung CA with live mets    Physician Order: IP Consult to Occupational Therapy  Reason for Th supports that patients with this level of impairment may benefit from BENNIE. Anticipate pt will progress towards therapy goals and demonstrate the physical skills to return home with HHOT and intermittent spv.       DISCHARGE RECOMMENDATIONS  OT Discharge Rec OBJECTIVE  Precautions: (oxygen)  Fall Risk: High fall risk    PAIN ASSESSMENT  Ratin          ACTIVITY TOLERANCE  Pulse: 115  Heart Rate Source: Monitor  BP: 117/63  BP Location: Right arm  BP Method: Automatic  Patient Position: Sitting     O2 WA present    OT Goals  Patients self stated goal is: home     Patient will complete functional transfer with Mod I  Comment:     Patient will complete LE dressing with Mod I  Comment:     Patient will tolerate standing for 4 minutes in prep for adls with Mod

## 2019-04-03 NOTE — PLAN OF CARE
Problem: Patient Centered Care  Goal: Patient preferences are identified and integrated in the patient's plan of care  Interventions:  - What would you like us to know as we care for you? Family is involved in patient's care.   - Provide timely, complete, a balance, assess for edema, trend weights  Outcome: Progressing    Goal: Absence of cardiac arrhythmias or at baseline  INTERVENTIONS:  - Continuous cardiac monitoring, monitor vital signs, obtain 12 lead EKG if indicated  - Evaluate effectiveness of antiar repeat lab results as appropriate  - Fluid restriction as ordered  - Instruct patient on fluid and nutrition restrictions as appropriate  Outcome: Progressing    Goal: Hemodynamic stability and optimal renal function maintained  INTERVENTIONS:  - Monitor l Denies any pain. Zosyn infusing per order. Pt states his abdomen feels better today. Pt had a BM this morning. Up with assist. Frequent rounds done. Call light within reach. Frequent rounds done.

## 2019-04-03 NOTE — PROGRESS NOTES
[facility]    Progress Note    Shae Andrae Razo Patient Status:  Inpatient    1940 MRN [id number]   Location [facility] 3W/SW Attending rAisteo Lizama Day # 2 PCP MD Red Archibald January of this year.   Undergoing chemotherapy/immunotherapy.  -Oncology consultation appreciated no mets seen liver but new ct not specifical     Type 2 diabetes    Hypertension    Dyslipidemia    Hypothyroidism    History of CVA      Osteoarthritis     D

## 2019-04-03 NOTE — PROGRESS NOTES
[facility] HOSP - [facility]    Hematology/Oncology   Progress Note    Anne Marie Manmelchor Patient Status:  Inpatient    1940 MRN [id number]   Location [facility] 3W/SW Attending Aristeo Lizama Day # 2 PCP Arelis Ocampo calculus or hydronephrosis. Bilateral lower lobe airspace opacities obscure patient's previously visualized bilateral lower lobe pulmonary masses.   Anasarca        Dictated by (CST): Harshal Michaels MD on 4/02/2019 at 17:02     Approved by (CST): Harshal Michaels

## 2019-04-03 NOTE — PHYSICAL THERAPY NOTE
PHYSICAL THERAPY EVALUATION - INPATIENT     Room Number: 343/343-A  Evaluation Date: 4/3/2019  Type of Evaluation: Initial   Physician Order: PT Eval and Treat    Presenting Problem: anemia, copd  Reason for Therapy: Mobility Dysfunction and Discharge Benjie Diagnosis Date   • CAD (coronary artery disease)     s/p CABG   • Cataract    • COPD (chronic obstructive pulmonary disease) (HCC)    • Coronary atherosclerosis    • Diabetes ([facility] [address]    • Disorder of thyroid    • High blood pressure    • High cholesterol Sitting    O2 WALK        SPO2 Ambulation on Oxygen: 90(97 at rest)  Ambulation oxygen flow (liters per minute): 4      AM-PAC '6-Clicks' INPATIENT SHORT FORM - BASIC MOBILITY  How much difficulty does the patient currently have. ..  -   Turning over in bed modified independent with walker - rolling     Goal #2  Current Status    Goal #3 Patient is able to ambulate 50   feet with assist device: walker - rolling at assistance level: supervision with 02 saturation above 90%   Goal #3   Current Status    Goal #4

## 2019-04-03 NOTE — PLAN OF CARE
Problem: Patient Centered Care  Goal: Patient preferences are identified and integrated in the patient's plan of care  Interventions:  - What would you like us to know as we care for you? Family is involved in patient's care.   - Provide timely, complete, a cardiac arrhythmias or at baseline  INTERVENTIONS:  - Continuous cardiac monitoring, monitor vital signs, obtain 12 lead EKG if indicated  - Evaluate effectiveness of antiarrhythmic and heart rate control medications as ordered  - Initiate emergency measur on fluid and nutrition restrictions as appropriate  Outcome: Progressing    Goal: Hemodynamic stability and optimal renal function maintained  INTERVENTIONS:  - Monitor labs and assess for signs and symptoms of volume excess or deficit  - Monitor intake, o begging of night shift was at high 40's. Around 3AM, pt's HR climbed to 112-115. Pt is asymptomatic. Pt asked for sleeping pill last night, but fell asleep and did not take the pill. Pt denies any pain in the abdomen last night.  Call light within patient's

## 2019-04-03 NOTE — PROGRESS NOTES
[facility]IN[facility]  Progress Note    Yordan Razo Patient Status:  Inpatient    1940 MRN [id number]   Location [facility] 3W/SW Attending [facility] Day # 2 PCP Arelis Contreras MD     Assessment:    1.   H 2+.  Skin: Warm and dry.      Labs:  Lab Results   Component Value Date    WBC 13.6 04/03/2019    HGB 7.1 04/03/2019    HCT 24.0 04/03/2019    .0 04/03/2019     Lab Results   Component Value Date    INR 1.14 04/01/2019     Lab Results   Component Monica

## 2019-04-04 NOTE — PROGRESS NOTES
[facility] AND CLINICS  Progress Note    Shante Razo Patient Status:  Inpatient    1940 MRN [id number]   Location [facility] 3W/SW Attending Aristeo Lizama [place] Day # 3 PCP Arelis Contreras MD     Assessment:    1.   H Date    WBC 14.0 04/04/2019    HGB 7.1 04/04/2019    HCT 24.5 04/04/2019    .0 04/04/2019     Lab Results   Component Value Date    INR 1.14 04/01/2019     Lab Results   Component Value Date     04/04/2019    K 4.8 04/04/2019     04/04/2

## 2019-04-04 NOTE — PROGRESS NOTES
[facility] HOSP - [facility]    Hematology/Oncology   Progress Note    Júnior Razo Patient Status:  Inpatient    1940 MRN [id number]   Location [facility] 3W/SW Attending Aristeo Lizama Day # 3 PCP Arelis Ocampo opacities obscure patient's previously visualized bilateral lower lobe pulmonary masses.   Anasarca        Dictated by (CST): Chasidy Lyle MD on 4/02/2019 at 17:02     Approved by (CST): Chasidy Lyle MD on 4/02/2019 at 17:10              Medications reviewe

## 2019-04-04 NOTE — PLAN OF CARE
Problem: Patient Centered Care  Goal: Patient preferences are identified and integrated in the patient's plan of care  Interventions:  - What would you like us to know as we care for you? Family is involved in patient's care.   - Provide timely, complete, a cardiac arrhythmias or at baseline  INTERVENTIONS:  - Continuous cardiac monitoring, monitor vital signs, obtain 12 lead EKG if indicated  - Evaluate effectiveness of antiarrhythmic and heart rate control medications as ordered  - Initiate emergency measur on fluid and nutrition restrictions as appropriate  Outcome: Progressing    Goal: Hemodynamic stability and optimal renal function maintained  INTERVENTIONS:  - Monitor labs and assess for signs and symptoms of volume excess or deficit  - Monitor intake, o very involved with the patient's care. Pt denies any abdominal discomfort tonight. Call light and urinal within patient's reach. Will continue to monitor pt.

## 2019-04-04 NOTE — PROGRESS NOTES
[facility]    Progress Note    Abdelrahman Razo Patient Status:  Inpatient    1940 MRN [id number]   Location [facility] 3W/SW Attending Aristeo Lizama Day # 3 PCP Arelis Contreras MD        [facility] unit.  Nursing discussed with oncology today, no further transfusion planned for today. -CBC tomorrow  -Transfused by dr      Rapid atrial fibrillation/flutter.  Better on diltiazem drip.  Still 118 cards recs appreciated  -Cardizem drip  -Oral beta-blocke colonoscopy should be performed to exclude an underlying mass. Bladder wall thickening likely secondary from an enlarged prostate. No renal calculus or hydronephrosis.   Bilateral lower lobe airspace opacities obscure patient's previously visualized bila

## 2019-04-04 NOTE — OCCUPATIONAL THERAPY NOTE
OCCUPATIONAL THERAPY TREATMENT NOTE - INPATIENT        Room Number: 343/343-A           Presenting Problem: hypoxic, tachy; lung CA with live mets    Problem List  Principal Problem:    Anemia, unspecified type  Active Problems:    Anemia    Acute congesti PAIN ASSESSMENT  Ratin           ACTIVITY TOLERANCE                         O2 SATURATIONS           Ambulation oxygen flow (liters per minute): 5    ACTIVITIES OF DAILY LIVING ASSESSMENT  AM-PAC ‘6-Clicks’ Inpatient Daily Activity Short Form complete item retrieval with Mod I  Comment:pt declined            Goals  on:    Frequency: 3-5x/wk

## 2019-04-04 NOTE — PAYOR COMM NOTE
Fahad García MD ASKING FOR INPT  O2 SAT 87%, STARTED ON O2  TRANSFUSED 1 UNIT PRBC'S  PT REMAINS IN HOUSE  ON CARDIZEM DRIP    CONTINUED STAY REVIEW    PayorGenejaycee Boyd MA O  Subscriber #:  [id number]  Authorization Number: [id number] Subcutaneous (Right Upper Arm) Stuart Gallardo RN      Insulin Aspart Pen (NOVOLOG) 100 UNIT/ML flexpen 6 Units     Date Action Dose Route User    4/3/2019 1832 Given 6 Units Subcutaneous (Right Upper Arm) Stuart Gallardo RN    4/3/2019 1241 Given 6 Unit Action Dose Route User    4/3/2019 1206 Given 125 mg Oral Clifton Lu, RN      Zolpidem Tartrate [facility], [facility]. - [facility] [place]) tab 5 mg     Date Action Dose Route User    4/4/2019 0109 Given 5 mg Oral [place]        4/3 CARDIOLOGY NOTE  :Assessment:     1. Peripheral pulses are 2+.   Skin: Warm and dry.      Labs:        Lab Results   Component Value Date     WBC 13.6 04/03/2019     HGB 7.1 04/03/2019     HCT 24.0 04/03/2019     .0 04/03/2019            Lab Results   Component Value Date     INR 1.14 0 — — — — 160 lb 8 oz — — AH   04/01/19 1727 98.3 °F (36.8 °C) — 20 133/74 98 % — — — LP   04/01/19 1715 98.4 °F (36.9 °C) — 20 130/72 99 % — — — LP   04/01/19 1646 98.7 °F (37.1 °C) 119 20 125/66 98 % — Nasal cannula 3.5 L/min LP   04/01/19 1600 — 119 20 12 but new ct not specifical

## 2019-04-04 NOTE — CM/SW NOTE
Received MDO for home health RN, PT. PT/OT recommending home health. Met with patient and wife Brit Saavedra. Per patient, he and wife live in a ranch style home, 2 stairs. Wife assists with all ADLs/IADLs.  Patient has a cane, walker, wheel chair, and home O2 (Ap

## 2019-04-05 NOTE — PROGRESS NOTES
[facility]    Progress Note    Naila Razo Patient Status:  Inpatient    1940 MRN [id number]   Location [facility] 3W/SW Attending Aristeo Lizama Day # 4 PCP MD Courtney Archibald 04/02/2019    ALKPHO 137 (H) 04/02/2019    BILT 0.2 04/02/2019    TP 5.3 (L) 04/02/2019    AST 21 04/02/2019    ALT 44 04/02/2019    PTT 26.4 04/01/2019    INR 1.14 04/01/2019    TSH 2.550 04/02/2019    LIP 27 01/16/2019    MG 2.3 04/05/2019    PHOS 4.2 04

## 2019-04-05 NOTE — OCCUPATIONAL THERAPY NOTE
OCCUPATIONAL THERAPY TREATMENT NOTE - INPATIENT        Room Number: 343/343-A           Presenting Problem: hypoxic, tachy; lung CA with live mets    Problem List  Principal Problem:    Anemia, unspecified type  Active Problems:    Anemia    Acute congesti agreeable for OT session    OBJECTIVE  Precautions: (oxygen)    WEIGHT BEARING RESTRICTION  Weight Bearing Restriction: None                PAIN ASSESSMENT  Ratin           ACTIVITY TOLERANCE                         O2 SATURATIONS        SPO2 Ambulatio noted    Patient will complete LE dressing with Mod I  Comment: max assist 2/ poor endurance, discussed use of  LH AE, info given for purchase    Patient will tolerate standing for 4 minutes in prep for adls with Mod I   Comment: pt tolerated 2 min standin

## 2019-04-05 NOTE — PROGRESS NOTES
[facility]    Hematology/Oncology   Progress Note    Casandra Placido Razo Patient Status:  Inpatient    1940 MRN [id number]   Location [facility] 3W/SW Attending Aristeo Lizama Day # 4 PCP Arelis Ocampo Plan:  27-year-old male former smoker with COPD chronically on oxygen with metastatic adenocarcinoma of the lung with liver metastasis PD-L1 0% EGFR ROS 1 ALK BRAF negative. He was diagnosed at the end of January 2019.   Tissue was obtained via liver biops

## 2019-04-05 NOTE — PROGRESS NOTES
[facility]    Progress Note    Laisha Razo Patient Status:  Inpatient    1940 MRN [id number]   Location [facility] 3W/SW Attending Aristeo Lizama Day # 4 PCP MD Alma Archibald 04/01/2019    TSH 2.550 04/02/2019    LIP 27 01/16/2019    MG 2.3 04/05/2019    PHOS 4.2 04/05/2019    TROP 0.08 () 01/18/2019                        Frederic Knox MD  4/5/2019

## 2019-04-05 NOTE — PHYSICAL THERAPY NOTE
PHYSICAL THERAPY TREATMENT NOTE - INPATIENT     Room Number: 343/343-A       Presenting Problem: anemia, copd    Problem List  Principal Problem:    Anemia, unspecified type  Active Problems:    Anemia    Acute congestive heart failure, unspecified heart f breathing required    AM-PAC '6-Clicks' INPATIENT SHORT FORM - BASIC MOBILITY  How much difficulty does the patient currently have. ..  -   Turning over in bed (including adjusting bedclothes, sheets and blankets)?: A Little   -   Sitting down on and standi supervision with 02 saturation above 90%   Goal #3   Current Status  Pt amb 20' x 1 with rw, cga, chair follow, Sp02 87% post   Goal #4 Patient will negotiate 1 stairs/one curb w/ assistive device and supervision   Goal #4   Current Status  NT   Goal #5 Pa

## 2019-04-05 NOTE — CONSULTS
[facility] HOSP - [facility]    Consult Note    Date:  4/4/2019  Date of Admission:  4/1/2019      Chief Complaint:   Byron Fink is a(n) 66year old male with dyspnea.     HPI:   The patient has a long-standing history of Beris severe COPD as well as co Pneumothorax     failed pleurodesis, s/p decortication     Past Surgical History:   Procedure Laterality Date   • ESOPHAGOGASTRODUODENOSCOPY (EGD) N/A 1/17/2019    Performed by Blanca Delgado MD at [place] [address] nebulization 4 (four) times daily as needed for Wheezing. Albuterol Sulfate HFA (PROAIR HFA) 108 (90 BASE) MCG/ACT Inhalation Aero Soln Inhale 2 puffs into the lungs every 4 (four) hours as needed for Wheezing.    Budesonide-Formoterol Fumarate (SYMBICORT lower extremity edema. Neurologic grossly intact with symmetric tone and strength and reflex.   Skin without gross abnormality    Results:     Lab Results   Component Value Date    WBC 14.0 04/04/2019    HGB 8.4 04/04/2019    HCT 24.5 04/04/2019    PLT 17

## 2019-04-05 NOTE — PROGRESS NOTES
[facility] - [facility]    Progress Note    Whitley Razo Patient Status:  Inpatient    1940 MRN [id number]   Location [facility] 3W/SW Attending Aristeo Lizama Day # 4 PCP MD Paige Archibald araceli     Rapid atrial fibrillation/flutter.  Better on diltiazem drip. Still 118 cards recs appreciated  -Cardizem drip  -Oral beta-blocker  -Cardiology following  -Consider digoxin  -Continue Eliquis  -Consider stopping Multitaq if remains in A. Fib/flu

## 2019-04-06 NOTE — PLAN OF CARE
Problem: Patient Centered Care  Goal: Patient preferences are identified and integrated in the patient's plan of care  Description  Interventions:  - What would you like us to know as we care for you?  I live at home with my wife  - Provide timely, comple needed  - Assess and instruct to report SOB or any respiratory difficulty  - Respiratory Therapy support as indicated  - Manage/alleviate anxiety  - Monitor for signs/symptoms of CO2 retention  Outcome: Progressing     Problem: SAFETY ADULT - FALL  Goal: F

## 2019-04-06 NOTE — PROGRESS NOTES
[facility] Cardiology Progress Note      Emir Razo Patient Status:  Inpatient    1940 MRN [id number]   Location [facility] 3W/SW Attending Aristeo Lizama Day # 5 PCP Arelis Antonio Encounters:  04/06/19 : 162 lb 8 oz (73.7 kg)  03/11/19 : 154 lb (69.9 kg)  03/11/19 : 152 lb 8.9 oz (69.2 kg)  02/11/19 : 155 lb (70.3 kg)  02/06/19 : 146 lb 2.6 oz (66.3 kg)  01/24/19 : 147 lb 6.4 oz (66.9 kg)       Exam  Gen: No acute distress, alert an 04/04/19  1657 04/05/19  0631   RBC 2.64* 2.63*  --  3.00*   HGB 7.1* 7.1* 8.4* 8.2*   HCT 24.0* 24.5*  --  28.2*   MCV 90.9 93.2  --  94.0   MCH 26.9 27.0  --  27.3   MCHC 29.6* 29.0*  --  29.1*   RDW 23.4* 23.4*  --  22.5*   NEPRELIM 10.55* 10.27*  --  1

## 2019-04-06 NOTE — PROGRESS NOTES
[facility] HOSP - [facility]    Hematology/Oncology   Progress Note    Sina Razo Patient Status:  Inpatient    1940 MRN [id number]   Location [facility] 3W/SW Attending Aristeo Lizama Day # 5 PCP Arelis Ocampo or greater than 8 if symptomatic  All cancer directed therapy is on hold acutely. Will consider noncontrast Chest CT Monday if we don't see clinical improvement.    [address] cardiology consultation    Please call with questions.         Jefferson Wyman MD

## 2019-04-06 NOTE — PROGRESS NOTES
[facility]    Progress Note    Rani Hectorjazminsathish Razo Patient Status:  Inpatient    1940 MRN [id number]   Location [facility] 3W/SW Attending Aristeo Lizama Day # 5 PCP MD Ernestina Archibald (L) 04/02/2019    AST 21 04/02/2019    ALT 44 04/02/2019    PTT 26.4 04/01/2019    INR 1.14 04/01/2019    TSH 2.550 04/02/2019    LIP 27 01/16/2019    MG 2.0 04/06/2019    PHOS 3.8 04/06/2019    TROP 0.08 (HH) 01/18/2019                        Frederic Ramirez

## 2019-04-06 NOTE — PLAN OF CARE
Problem: Patient Centered Care  Goal: Patient preferences are identified and integrated in the patient's plan of care  Description  Interventions:  - What would you like us to know as we care for you? Family is involved in patient's care.   - Provide time weights  Outcome: Progressing  Goal: Absence of cardiac arrhythmias or at baseline  Description  INTERVENTIONS:  - Continuous cardiac monitoring, monitor vital signs, obtain 12 lead EKG if indicated  - Evaluate effectiveness of antiarrhythmic and heart rat rhythm and repeat lab results as appropriate  - Fluid restriction as ordered  - Instruct patient on fluid and nutrition restrictions as appropriate  Outcome: Progressing  Goal: Hemodynamic stability and optimal renal function maintained  Description  INTER diabetes  Outcome: Progressing    Pt alert. Reports no pain. Aware of HS accucheck of 423. Notified Dr. Elaina Keane. Orders rec'd. VSS. NO acute distress noted.

## 2019-04-06 NOTE — PROGRESS NOTES
[facility]    Progress Note    Nolvia Beni Donovanmelchor Patient Status:  Inpatient    1940 MRN [id number]   Location [facility] 3W/SW Attending Aristeo Lizama Day # 5 PCP MD Flaca Archibald following  -Consider digoxin  -Continue Eliquis  -Consider stopping Multitaq if remains in A. 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  Diagnosed January of this year.  Undergoing chemotherapy/immunotherapy.  -Oncology consu

## 2019-04-07 NOTE — PLAN OF CARE
Problem: Patient Centered Care  Goal: Patient preferences are identified and integrated in the patient's plan of care  Description  Interventions:  - What would you like us to know as we care for you?  I live at home with my wife  - Provide timely, comple ex. Angina  - Evaluate fluid balance, assess for edema, trend weights  Outcome: Not Progressing  Goal: Absence of cardiac arrhythmias or at baseline  Description  INTERVENTIONS:  - Continuous cardiac monitoring, monitor vital signs, obtain 12 lead EKG if i ordered  - Monitor response to electrolyte replacements, including rhythm and repeat lab results as appropriate  - Fluid restriction as ordered  - Instruct patient on fluid and nutrition restrictions as appropriate  Outcome: Progressing  Goal: Hemodynamic consult as needed  - Instruct patient on self management of diabetes  Outcome: Not Progressing     Problem: SAFETY ADULT - FALL  Goal: Free from fall injury  Description  INTERVENTIONS:  - Assess pt frequently for physical needs  - Identify cognitive and p

## 2019-04-07 NOTE — PLAN OF CARE
Report given to Maci Penaloza RN at 1030pm. Meds and belongings sent with the patient. Pt is alert and oriented. On 6L nasal cannula. Scheduled med given. Right chest port dressing intact.  Blood sugar @9pm was 399 even after pt received an additional of 12 units

## 2019-04-07 NOTE — PLAN OF CARE
Problem: Patient/Family Goals  Goal: Patient/Family Long Term Goal  Description  Patient's Long Term Goal: Discharge home with home health     Interventions:  - SW consult  - PT/OT consult  - Up in chair for meals  - Increase activity as tolerated  Idris Dunlap oxygenation  Description  INTERVENTIONS:  - Assess for changes in respiratory status  - Assess for changes in mentation and behavior  - Position to facilitate oxygenation and minimize respiratory effort  - Oxygen supplementation based on oxygen saturation for patient's volume status, including labs, urine output, blood pressure (other measures as available)  - Encourage oral intake as appropriate  - Instruct patient on fluid and nutrition restrictions as appropriate  Outcome: Progressing     Problem: PAIN - appropriate  - Consider OT/PT consult to assist with strengthening/mobility  - Encourage toileting schedule  - Bed locked in lowest position  - Call light and belongings within reach  - No skid socks on   - Bed alarm and chair alarm on  - Wife at bedside

## 2019-04-07 NOTE — PROGRESS NOTES
[place] [facility] - [facility]    Progress Note    Trey Razo Patient Status:  Inpatient    1940 MRN [id number]   Location [facility] 4W/SW/SE Attending Bessie Lizama   Hosp Day # 6 PCP Arelis Contreras MD        S 2.1 (L) 04/02/2019    ALKPHO 137 (H) 04/02/2019    BILT 0.2 04/02/2019    TP 5.3 (L) 04/02/2019    AST 21 04/02/2019    ALT 44 04/02/2019    PTT 26.4 04/01/2019    INR 1.14 04/01/2019    TSH 2.550 04/02/2019    LIP 27 01/16/2019    MG 2.0 04/06/2019    JONATHAN

## 2019-04-07 NOTE — PROGRESS NOTES
[facility]    Progress Note    Josh Stearns Danni Patient Status:  Inpatient    1940 MRN [id number]   Location [facility] 4W/SW/SE Attending Marcia Lizama   Hosp Day # 6 PCP Arelis Contreras MD        S ct not specifical     Copd family requests dr Vishal Meeks to see and he stopped abx     Acute renal failure poss atn from htn on ckd-3 watch fluids     Type 2 diabetes     Hypertension     Dyslipidemia     Hypothyroidism     History of CVA        Osteoarthritis

## 2019-04-07 NOTE — PROGRESS NOTES
[facility] HOSP - [facility]    Hematology/Oncology   Progress Note    Roscoe Razo Patient Status:  Inpatient    1940 MRN [id number]   Location [facility] 3W/SW Attending Aristeo Lizama Day # 6 PCP Arelis Ocampo or greater than 8 if symptomatic-  rec giving one unit prbcs tomorrow if hgb less than 8 again  All cancer directed therapy is on hold acutely. Of note liver mets not seen on recent ct and dominant LLL lung lesion slightly decreased in size.     Appreciat

## 2019-04-08 NOTE — PROGRESS NOTES
[facility] HOSP - [facility]    Hematology/Oncology   Progress Note    Abdelrahman Razo Patient Status:  Inpatient    1940 MRN [id number]   Location [facility] 3W/SW Attending Aristeo Lizama Day # 7 PCP Arelis Ocampo greater than 8 if symptomatic-   All cancer directed therapy is on hold acutely. Of note liver mets not seen on recent ct and dominant LLL lung lesion slightly decreased in size.     [address] cardiology consultation    Please call with questions.

## 2019-04-08 NOTE — PHYSICAL THERAPY NOTE
PHYSICAL THERAPY TREATMENT NOTE - INPATIENT     Room Number: 471/471-A       Presenting Problem: anemia, copd    Problem List  Principal Problem:    Anemia, unspecified type  Active Problems:    Anemia    Acute congestive heart failure, unspecified heart f mechanics; Endurance; Energy conservation;Patient education;Gait training;Transfer training;Balance training;Strengthening    SUBJECTIVE  \"I am short of breath\"    OBJECTIVE  Precautions: (oxygen)    WEIGHT BEARING RESTRICTION  Weight Bearing Restriction: Patient is able to demonstrate transfers Sit to/from Stand at assistance level: modified independent with walker - rolling      Goal #2  Current Status Min A x 1-2 with rolling walker    Goal #3 Patient is able to ambulate 50   feet with assist device: wal

## 2019-04-08 NOTE — PROGRESS NOTES
Pulmonary/Critical Care Follow Up Note    HPI:   Kiersten Subramanian is a 66year old male with Patient presents with:  Arrythmia/Palpitations (cardiovascular)      PCP Arelis Contreras MD  Admission Attending Peter Camacho MD    Hospital Day #7  (90 Base) MCG/ACT inhaler 2 puff, 2 puff, Inhalation, Q4H PRN  •  atorvastatin (LIPITOR) tab 80 mg, 80 mg, Oral, Nightly  •  Fluticasone Furoate-Vilanterol (BREO ELLIPTA) 200-25 MCG/INH inhaler 1 puff, 1 puff, Inhalation, Daily  •  dronedarone HCl very quiet and wheeeze  cv reg   abd soft   Ext + edema    LABS:    Lab Results   Component Value Date    WBC 16.2 04/08/2019    HGB 8.1 04/08/2019    HCT 28.1 04/08/2019    .0 04/08/2019    CREATSERUM 1.52 04/08/2019    BUN 56 04/08/2019

## 2019-04-08 NOTE — PLAN OF CARE
Problem: Patient Centered Care  Goal: Patient preferences are identified and integrated in the patient's plan of care  Description  Interventions:  - What would you like us to know as we care for you?  I live at home with my wife  - Provide timely, complete ex. Angina  - Evaluate fluid balance, assess for edema, trend weights  Outcome: Not Progressing  Goal: Absence of cardiac arrhythmias or at baseline  Description  INTERVENTIONS:  - Continuous cardiac monitoring, monitor vital signs, obtain 12 lead EKG if i ordered  - Monitor response to electrolyte replacements, including rhythm and repeat lab results as appropriate  - Fluid restriction as ordered  - Instruct patient on fluid and nutrition restrictions as appropriate  Outcome: Progressing  Goal: Hemodynamic consult as needed  - Instruct patient on self management of diabetes  Outcome: Not Progressing     Problem: SAFETY ADULT - FALL  Goal: Free from fall injury  Description  INTERVENTIONS:  - Assess pt frequently for physical needs  - Identify cognitive and p

## 2019-04-08 NOTE — CM/SW NOTE
419pm:  JANNA received a call from MD regarding a hospital bed at home. JANNA confirmed with Svitlana Bowles with HME, they are able to provide hospital beds for pt's with [facility]. Referral made to Svitlana Bowles with [address] [place] [address]. MD stated [facility] form is signed in the chart.

## 2019-04-08 NOTE — OCCUPATIONAL THERAPY NOTE
OCCUPATIONAL THERAPY TREATMENT NOTE - INPATIENT        Room Number: 471/471-A           Presenting Problem: hypoxic, tachy; lung CA with live mets    Problem List  Principal Problem:    Anemia, unspecified type  Active Problems:    Anemia    Acute congesti training;Equipment eval/education; Compensatory technique education    SUBJECTIVE  Pt agreeable to therapy     OBJECTIVE  Precautions: (oxygen)    WEIGHT BEARING RESTRICTION  Weight Bearing Restriction: None                PAIN ASSESSMENT  Ratin adls with Mod I   Comment: pt tolerated 2 min standing with RW    Patient will complete item retrieval with Mod I  Comment:pt declined            Goals  on:    Frequency: 3-5x/wk    Carlos Peters OTR/L  [facility]

## 2019-04-08 NOTE — DIETARY NOTE
ADULT NUTRITION INITIAL ASSESSMENT    Pt is at moderate nutrition risk. Pt does not meet malnutrition criteria.       RECOMMENDATIONS TO MD:  See Nutrition Intervention  Recommend diet change to Carbohydrate controlled diet 1800 kcal/60 grams CHO per [facility] ([facility]) He also has no past medical history of Anesthesia complication or Type 1 diabetes mellitus ([facility] [address] ANTHROPOMETRICS:  HT: 165.1 cm (5' 5\")  WT: 73.7 kg (162 lb 8 oz) --true wt masked by edema. Estimate true wt of 155#--use in calculation.       BMI: Body Daily   • Levothyroxine Sodium  50 mcg Oral Before breakfast   • Pantoprazole Sodium  40 mg Oral QAM AC   • predniSONE  20 mg Oral Daily with breakfast   • Roflumilast  250 mcg Oral Daily   • insulin detemir  17 Units Subcutaneous Nightly   • Insulin Aspar

## 2019-04-08 NOTE — PROGRESS NOTES
[facility]  Progress Note    Emir Razo Patient Status:  Inpatient    1940 MRN [id number]   Location [facility] 4W/SW/SE Attending Dana Lizama [place]   Hosp Day # 7 PCP Arelis Contreras MD     Assessment:    1. 56 04/08/2019    CREATSERUM 1.52 04/08/2019     04/08/2019    MG 2.2 04/08/2019    CA 8.4 04/08/2019        Lab Results   Component Value Date    TROP 0.08 () 01/18/2019    TROP 0.09 () 01/17/2019    TROP 0.11 () 01/17/2019        Medications:

## 2019-04-08 NOTE — PROGRESS NOTES
[place] [facility] - [facility]    Progress Note    Naila Razo Patient Status:  Inpatient    1940 MRN [id number]   Location [facility] 4W/SW/SE Attending Lorene Lizama   Hosp Day # 7 PCP Arelis Contreras MD        S dr charles     Rapid atrial fibrillation/flutter.  Better on diltiazem drip. Still 118 cards recs appreciated  -Cardizem drip  -Oral beta-blocker  -Cardiology following  -Consider digoxin  -Continue Eliquis  -Consider stopping Multitaq if remains in A. Fib/

## 2019-04-08 NOTE — PLAN OF CARE
Problem: Patient Centered Care  Goal: Patient preferences are identified and integrated in the patient's plan of care  Description  Interventions:  - What would you like us to know as we care for you?  I live at home with my wife  - Provide timely, comple Angina  - Evaluate fluid balance, assess for edema, trend weights  Outcome: Progressing  Goal: Absence of cardiac arrhythmias or at baseline  Description  INTERVENTIONS:  - Continuous cardiac monitoring, monitor vital signs, obtain 12 lead EKG if indicated Monitor response to electrolyte replacements, including rhythm and repeat lab results as appropriate  - Fluid restriction as ordered  - Instruct patient on fluid and nutrition restrictions as appropriate  Outcome: Progressing  Goal: Hemodynamic stability a needed  - Instruct patient on self management of diabetes  Outcome: Progressing     Problem: SAFETY ADULT - FALL  Goal: Free from fall injury  Description  INTERVENTIONS:  - Assess pt frequently for physical needs  - Identify cognitive and physical deficit

## 2019-04-08 NOTE — PROGRESS NOTES
[facility]    Progress Note    Noliva Graysongonzales Manmelchor Patient Status:  Inpatient    1940 MRN [id number]   Location [facility] 4W/SW/SE Attending Matteo Lizama   Hosp Day # 6 PCP Arelis Contreras MD        S 04/07/2019    CO2 35.0 (H) 04/07/2019    GLU 75 04/07/2019    CA 8.2 (L) 04/07/2019    ALB 2.1 (L) 04/02/2019    ALKPHO 137 (H) 04/02/2019    BILT 0.2 04/02/2019    TP 5.3 (L) 04/02/2019    AST 21 04/02/2019    ALT 44 04/02/2019    PTT 26.4 04/01/2019    I

## 2019-04-09 NOTE — PROGRESS NOTES
Patient's O2 sat on room air is 79% at rest. Pt's O2 sat on room air is 75% with exertion, and 93% on 6 liters while ambulating.

## 2019-04-09 NOTE — PLAN OF CARE
Problem: Patient Centered Care  Goal: Patient preferences are identified and integrated in the patient's plan of care  Description  Interventions:  - What would you like us to know as we care for you?  I live at home with my wife  - Provide timely, comple Angina  - Evaluate fluid balance, assess for edema, trend weights  Outcome: Progressing  Goal: Absence of cardiac arrhythmias or at baseline  Description  INTERVENTIONS:  - Continuous cardiac monitoring, monitor vital signs, obtain 12 lead EKG if indicated fluid and nutrition restrictions as appropriate  Outcome: Progressing     Problem: PAIN - ADULT  Goal: Verbalizes/displays adequate comfort level or patient's stated pain goal  Description  INTERVENTIONS:  - Encourage pt to monitor pain and request assista within reach  - No skid socks on   - Bed alarm and chair alarm on  - Wife at bedside  - Frequent rounding   Outcome: Progressing     Patient continuing to experience dyspnea, O2 at 6L, up to chair and bathroom today, voiding, tolerating diet, denies pain,

## 2019-04-09 NOTE — PROGRESS NOTES
[facility] AND CLINICS  Progress Note    Trey Razo Patient Status:  Inpatient    1940 MRN [id number]   Location [facility] 4W/SW/SE Attending Aristeo Lizamaissa Day # 8 PCP Arelis Contreras MD     Assessment:    1. INR 1.14 04/01/2019     Lab Results   Component Value Date     04/09/2019    K 5.0 04/09/2019     04/09/2019    CO2 36.0 04/09/2019    BUN 54 04/09/2019    CREATSERUM 1.32 04/09/2019     04/09/2019    MG 2.1 04/09/2019    CA 8.2 04/09/20

## 2019-04-09 NOTE — PROGRESS NOTES
[facility]    Progress Note    Nolvia Beni Razo Patient Status:  Inpatient    1940 MRN [id number]   Location [facility] 4W/SW/SE Attending Matteo Lizama   Hosp Day # 8 PCP Arelis Contreras MD        S beta-blocker  -Cardiology following  -Continue Eliquis/multaq     Lung adenocarcinoma, stage IV.  Metastases to the liver.  Diagnosed January of this year.  Undergoing chemotherapy/immunotherapy.  -Oncology consultation appreciated no mets seen liver but n 137 (H) 04/02/2019    BILT 0.2 04/02/2019    TP 5.3 (L) 04/02/2019    AST 21 04/02/2019    ALT 44 04/02/2019    PTT 26.4 04/01/2019    INR 1.14 04/01/2019    TSH 2.550 04/02/2019    LIP 27 01/16/2019    MG 2.1 04/09/2019    PHOS 2.7 04/08/2019    TROP 0.08

## 2019-04-09 NOTE — PLAN OF CARE
Problem: Patient Centered Care  Goal: Patient preferences are identified and integrated in the patient's plan of care  Description  Interventions:  - What would you like us to know as we care for you?  I live at home with my wife  - Provide timely, comple Angina  - Evaluate fluid balance, assess for edema, trend weights  Outcome: Progressing  Goal: Absence of cardiac arrhythmias or at baseline  Description  INTERVENTIONS:  - Continuous cardiac monitoring, monitor vital signs, obtain 12 lead EKG if indicated measures as appropriate and evaluate response  - Consider cultural and social influences on pain and pain management  - Manage/alleviate anxiety  - Utilize distraction and/or relaxation techniques  - Monitor for opioid side effects  - Notify MD/LIP if inte Progressing     Problem: METABOLIC/FLUID AND ELECTROLYTES - ADULT  Goal: Glucose maintained within prescribed range  Description  INTERVENTIONS:  - Monitor Blood Glucose as ordered  - Assess for signs and symptoms of hyperglycemia and hypoglycemia  - Admin

## 2019-04-09 NOTE — PROGRESS NOTES
[facility]    Progress Note      Assessment and Plan:   1. Acute on chronic respiratory failure–the patient has multifactorial respiratory failure with severe end-stage COPD which profoundly limits patient's activities at baseline.   This i 04/09/2019    HGB 7.9 04/09/2019    HCT 26.9 04/09/2019    .0 04/09/2019    CREATSERUM 1.32 04/09/2019    BUN 54 04/09/2019     04/09/2019    K 5.0 04/09/2019     04/09/2019    CO2 36.0 04/09/2019     04/09/2019    CA 8.2 04/09/20

## 2019-04-10 NOTE — PROGRESS NOTES
[facility] AND CLINICS  Progress Note    Sina Razo Patient Status:  Inpatient    1940 MRN [id number]   Location [facility] 4W/SW/SE Attending Samaria Lynch MD   Hosp Day # 9 PCP Arelis Contreras MD     Assessment:    1.   Hf MG 1.8 04/10/2019    CA 8.0 04/10/2019        Lab Results   Component Value Date    TROP 0.08 () 01/18/2019    TROP 0.09 () 01/17/2019    TROP 0.11 () 01/17/2019        Medications:    • magnesium oxide  400 mg Oral Once   • sodium chloride   Viry Gonzalez

## 2019-04-10 NOTE — PROGRESS NOTES
[facility]    Progress Note      Assessment and Plan:   1. Acute on chronic respiratory failure–the patient has multifactorial respiratory failure with severe end-stage COPD which profoundly limits patient's activities at baseline.   This i intact with symmetric tone and strength and reflex.   Skin without gross abnormality     Results:     Lab Results   Component Value Date    WBC 18.4 04/10/2019    HGB 7.8 04/10/2019    HCT 26.7 04/10/2019    .0 04/10/2019    CREATSERUM 1.27 04/10/201

## 2019-04-10 NOTE — PROGRESS NOTES
[facility]    Progress Note    Emir Razo Patient Status:  Inpatient    1940 MRN [id number]   Location [facility] 4W/SW/SE Attending Bartolo Hartley MD   [facility] Day # 9 PCP Arelis Contreras MD        Fitzhugh Degree but new ct not specific  Fu dr [facility] if/when ok for more chemo  Pt immobility, pain, sob, metastatic cancer refuses rehab  Appropriate home hosp bed     Copd family requests dr Gabi Peña to see and he stopped abx     Acute renal failure poss atn from htn on ckd MD  4/10/2019

## 2019-04-10 NOTE — CM/SW NOTE
SW received MDO for discharge planning; 24hr care, [address] & O2 needs at home. SW met w/ pt to follow up on eventual discharge needs. Pt's son, Lady Hernandez was also present. PT/OT recommending SNF.  Pt stated that he has a hx at [facility] and did not like his experi [employer]    Irving Delaney, LSW - ext. [phone]

## 2019-04-10 NOTE — PROGRESS NOTES
[facility] HOSP - [facility]    Hematology/Oncology   Progress Note    Estefany Razo Patient Status:  Inpatient    1940 MRN [id number]   Location [facility] 3W/SW Attending Aristeo Lizama Day # 8 PCP Arelis Ocampo pembolizumab February 2019 and has completed 2 cycles to date  –Due for cycle #3 of treatment however now admitted to the hospital with worsening fatigue anemia requiring transfusion atrial tachyarrhythmia shortness of breath  –Also evidence of acute kidne

## 2019-04-10 NOTE — OCCUPATIONAL THERAPY NOTE
OCCUPATIONAL THERAPY TREATMENT NOTE - INPATIENT        Room Number: 471/471-A           Presenting Problem: hypoxic, tachy; lung CA with live mets    Problem List  Principal Problem:    Anemia, unspecified type  Active Problems:    Anemia    Acute congesti session    OBJECTIVE  Precautions: (oxygen)    WEIGHT BEARING RESTRICTION  Weight Bearing Restriction: None                PAIN ASSESSMENT  Ratin           ACTIVITY TOLERANCE                         O2 SATURATIONS        SPO2 Ambulation on Oxygen: 93 chair; With [facility] staff;Needs met;Call light within reach    OT Goals:       Patient will complete functional transfer with Mod I Comment: Sit to stand with min assist. Decreased standing tolerance noted    Patient will complete LE dressing with Mod I Comment:

## 2019-04-11 NOTE — PHYSICAL THERAPY NOTE
PHYSICAL THERAPY TREATMENT NOTE - INPATIENT     Room Number: 471/471-A       Presenting Problem: anemia, copd    Problem List  Principal Problem:    Anemia, unspecified type  Active Problems:    Anemia    Acute congestive heart failure, unspecified heart f rest)  Ambulation oxygen flow (liters per minute): 4      AM-PAC '6-Clicks' INPATIENT SHORT FORM - BASIC MOBILITY  How much difficulty does the patient currently have. ..  -   Turning over in bed (including adjusting bedclothes, sheets and blankets)?: None to bedside chair with rolling walker cga, 88% on 4 L02   Goal #4 Patient will negotiate 1 stairs/one curb w/ assistive device and supervision   Goal #4   Current Status Not tested    Goal #5 Patient to demonstrate independence with home activity/exercise i

## 2019-04-11 NOTE — PLAN OF CARE
No changes today, pt up in chair, worked with PT, O2 at 5L , C/O shortness of breath with exertion, continue bumex as ordered.

## 2019-04-11 NOTE — PROGRESS NOTES
[facility] AND CLINICS  Progress Note    Da Razo Patient Status:  Inpatient    1940 MRN [id number]   Location [facility] 4W/SW/SE Attending Waldemar Thompson MD   Hosp Day # 10 PCP Arelis Contreras MD     Assessment:    1.   H 143 04/11/2019    K 4.9 04/11/2019     04/11/2019    CO2 37.0 04/11/2019    BUN 52 04/11/2019    CREATSERUM 1.36 04/11/2019     04/11/2019    MG 1.9 04/11/2019    CA 8.5 04/11/2019        Lab Results   Component Value Date    TROP 0.08 (HH) 01

## 2019-04-11 NOTE — PROGRESS NOTES
[facility]    Hematology/Oncology   Progress Note    Sina Razo Patient Status:  Inpatient    1940 MRN [id number]   Location [facility] 3W/SW Attending Aristeo Lizama Day # 9 PCP Arelis Ocampo pemetrexed and pembolizumab February 2019 and has completed 2 cycles to date  –Due for cycle #3 of treatment however now admitted to the hospital with worsening fatigue anemia requiring transfusion atrial tachyarrhythmia shortness of breath  –Also evidence

## 2019-04-11 NOTE — PROGRESS NOTES
[facility]    Progress Note      Assessment and Plan:   1. Acute on chronic respiratory failure–the patient has multifactorial respiratory failure with severe end-stage COPD which profoundly limits patient's activities at baseline.   This i 104.0 04/11/2019    CREATSERUM 1.36 04/11/2019    BUN 52 04/11/2019     04/11/2019    K 4.9 04/11/2019     04/11/2019    CO2 37.0 04/11/2019     04/11/2019    CA 8.5 04/11/2019    MG 1.9 04/11/2019       Tracy Logan MD  Medical Dir

## 2019-04-11 NOTE — PROGRESS NOTES
[facility]    Progress Note    Ana Razo Patient Status:  Inpatient    1940 MRN [id number]   Location [facility] 4W/SW/SE Attending Blanca Porras MD   Hosp Day # 10 PCP MD Teofilo Archibald metastatic cancer refuses rehab  Appropriate home hosp bed     Acute renal failure poss atn from htn on ckd-3      Type 2 diabetes  - blood sugars with improved control today.   - continue medium dose sliding scale  - will monitor closely      Hypertension

## 2019-04-12 NOTE — PROGRESS NOTES
[facility]    Progress Note    Whitley Razo Patient Status:  Inpatient    1940 MRN [id number]   Location [facility] 4W/SW/SE Attending Abbie Phillips MD   Hosp Day # 6 PCP Arelis Contreras MD        Meta Skiff ok for more chemo  Pt immobility, pain, sob, metastatic cancer refuses rehab  Appropriate home hosp bed     Acute renal failure poss atn from htn on ckd-3      Type 2 diabetes  - blood sugars with improved control today.   - continue medium dose sliding sca

## 2019-04-12 NOTE — PROGRESS NOTES
[facility] - [facility]    Progress Note    Lisa Razo Patient Status:  Inpatient    1940 MRN [id number]   Location [facility] 4W/SW/SE Attending Terra Haider MD   [facility] Day # 6 PCP Arelis Contreras MD       Assessm murmur  Abd: Abdomen soft, nontender, nondistended, no organomegaly, bowel sounds present  Ext:  no clubbing, no cyanosis, 1+ LE edema  Neuro: no focal deficits  Skin: no rashes     Scheduled Meds:   • bumetanide  2 mg Oral Daily   • albuterol sulfate  2.5 in the last 168 hours.     Lab Results   Component Value Date    INR 1.14 04/01/2019    INR 1.1 01/16/2019    INR 1.0 01/04/2017                       CONNER Young  4/12/2019

## 2019-04-12 NOTE — PROGRESS NOTES
[facility]    Hematology/Oncology   Progress Note    Jennifer Razo Patient Status:  Inpatient    1940 MRN [id number]   Location [facility] 3W/SW Attending Aristeo Lizama Day # 10 PCP Arelis Fernandes All cancer directed therapy is on hold acutely. Of note liver mets not seen on recent ct and dominant LLL lung lesion slightly decreased in size.   Given the burden of his cancer is minimal with radiographic response will hold chemo for several weeks/in

## 2019-04-12 NOTE — PHYSICAL THERAPY NOTE
PHYSICAL THERAPY TREATMENT NOTE - INPATIENT     Room Number: 471/471-A       Presenting Problem: anemia, copd    Problem List  Principal Problem:    Anemia, unspecified type  Active Problems:    Anemia    Acute congestive heart failure, unspecified heart f Static Sitting: Good  Dynamic Sitting: Fair +           Static Standing: Fair  Dynamic Standing: Fair -    ACTIVITY TOLERANCE                         O2 WALK        SPO2 Ambulation on Oxygen: 82  Ambulation oxygen flow (liters per mi is able to ambulate 50   feet with assist device: walker - rolling at assistance level: supervision with 02 saturation above 90%   Goal #3   Current Status 20 x 2 ft, SpO2 82% on 5LO2   Goal #4 Patient will negotiate 1 stairs/one curb w/ assistive device a

## 2019-04-12 NOTE — OCCUPATIONAL THERAPY NOTE
OCCUPATIONAL THERAPY TREATMENT NOTE - INPATIENT        Room Number: 471/471-A           Presenting Problem: hypoxic, tachy; lung CA with live mets    Problem List  Principal Problem:    Anemia, unspecified type  Active Problems:    Anemia    Acute congesti sockaid, grab bars, chair for sink area  Pt remained in chair with staff for toileting needs . DISCHARGE RECOMMENDATIONS  OT Discharge Recommendations: Sub-acute rehabilitation  OT Device Recommendations: Reacher;Sock aid;3-in-1 commode;Grab bars; Shower supervision  Bathing: NT  Toileting: NT  Upper Extremity Dressing: min assist  Lower Extremity Dressing: mod assist with many rest breaks    Education Provided: role of OT, LE dressing withAE PRN, standing endurance, safety with transfers with use of RW fo

## 2019-04-12 NOTE — PROGRESS NOTES
[facility]    [facility] Note    Roscoe Razo Patient Status:  Inpatient    1940 MRN [id number]   Location [facility] 4W/SW/SE Attending Brannon Buckner MD   Hosp Day # 6 PCP MD Kaity Archibald TP 5.3 (L) 04/02/2019    AST 21 04/02/2019    ALT 44 04/02/2019    PTT 26.4 04/01/2019    INR 1.14 04/01/2019    TSH 2.550 04/02/2019    LIP 27 01/16/2019    MG 2.1 04/12/2019    PHOS 3.4 04/12/2019    TROP 0.08 (HH) 01/18/2019                        Ah

## 2019-04-12 NOTE — PROGRESS NOTES
[facility] HOSP - [facility]    Hematology/Oncology   Progress Note    Shae Razo Patient Status:  Inpatient    1940 MRN [id number]   [facility] 3W/SW Attending Aristeo Lizama Day # 11 PCP Arelis Fernandes greater than 7 or greater than 8 if symptomatic-   All cancer directed therapy is on hold acutely. Of note liver mets not seen on recent ct and dominant LLL lung lesion slightly decreased in size.   Given the burden of his cancer is minimal with radiograp

## 2019-04-13 NOTE — PROGRESS NOTES
[facility]    Progress Note      Assessment and Plan:   1. Acute on chronic respiratory failure–the patient has multifactorial respiratory failure with severe end-stage COPD which profoundly limits patient's activities at baseline.   This i Abdomen nontender, without hepatosplenomegaly and no mass appreciable. Extremities without clubbing cyanosis nor edema. Neurologic grossly intact with symmetric tone and strength and reflex.   Skin without gross abnormality     Results:     Lab Result

## 2019-04-13 NOTE — PLAN OF CARE
MD called because pt developed large blood filled spot on conjunctiva of left eye. Pt states he has no pain, and did not damage the eye at all, and was not straining at the time it developed. Pt was seen by hospitalitis.  Call if pt complains of any blurred

## 2019-04-13 NOTE — PROGRESS NOTES
[facility]    Progress Note    Raquel Razo Patient Status:  Inpatient    1940 MRN [id number]   Location [facility] 4W/SW/SE Attending Misty Montes MD   Hosp Day # 12 PCP Arelis Contreras MD       Assessm Heart with regular rate and rhythm, nl W7,X7 , systolic murmur  Abd: Abdomen soft, nontender, nondistended, no organomegaly, bowel sounds present  Ext:  no clubbing, no cyanosis, 1+ LE edema  Neuro: no focal deficits  Skin: no rashes     Scheduled Meds: last 168 hours. Lab Results   Component Value Date    INR 1.14 04/01/2019    INR 1.1 01/16/2019    INR 1.0 01/04/2017     Independent A/P.  Patient evaluated, agree with above.  Note reviewed and labs reviewed.  Agree with assessment and plan with change

## 2019-04-13 NOTE — PROGRESS NOTES
[facility]    Progress Note    Da Razo Patient Status:  Inpatient    1940 MRN [id number]   Location [facility] 4W/SW/SE Attending Lupe Obrien MD   Hosp Day # 15 PCP Arelis Contreras MD       Subject Once   • melatonin  1 mg Oral Nightly   • Umeclidinium Bromide  1 puff Inhalation Daily   • sodium chloride   Intravenous Once   • metoprolol Tartrate  50 mg Oral 2x Daily(Beta Blocker)   • dilTIAZem HCl ER Coated Beads  180 mg Oral Daily   • apixaban  2.5 RDW 21.0* 20.7* 20.3*   NEPRELIM 15.17* 14.25* 12.36*   WBC 18.4* 17.5* 15.2*   .0* 96.0* 83.0*     Recent Labs   Lab 04/11/19  0528 04/12/19  0538 04/13/19  0523   * 138* 147*   BUN 52* 57* 61*   CREATSERUM 1.36* 1.50* 1.46*   GFRAA 57* 51 Prophylaxis: eliquis - hold one dose     CODE status: DNR     Dispo:   - continue current management, Clinical status is improving.   - if continues to be stable, likely discharge home in AM with [place] [phone] services  - will continue to monitor, repeat labs in am.

## 2019-04-14 NOTE — TRANSITION NOTE
[facility]    Cardiology Discharge Summary    Zahra Razo Patient Status:  Inpatient    1940 MRN [id number]   Location [facility] 4W/SW/SE Attending Arlin Barroso MD   [facility] Day # 15 PCP UNA Archibald taking these medications      Instructions Prescription details   albuterol sulfate (2.5 MG/3ML) 0.083% Nebu  Commonly known as:  VENTOLIN      Take 2.5 mg by nebulization 4 (four) times daily as needed for Wheezing.    Refills:  0     PROAIR  (90 Ba ZOFRAN      Take 1 tablet (8 mg total) by mouth every 8 (eight) hours as needed for Nausea.    Quantity:  30 tablet  Refills:  3     Pantoprazole Sodium 40 MG Tbec  Commonly known as:  PROTONIX      Take 40 mg by mouth every morning before breakfast.   Refi

## 2019-04-14 NOTE — PLAN OF CARE
Problem: Patient Centered Care  Goal: Patient preferences are identified and integrated in the patient's plan of care  Description  Interventions:  - What would you like us to know as we care for you?  I live at home with my wife  - Provide timely, comple decreased coronary artery perfusion - ex.  Angina  - Evaluate fluid balance, assess for edema, trend weights  Outcome: Adequate for Discharge  Goal: Absence of cardiac arrhythmias or at baseline  Description  INTERVENTIONS:  - Continuous cardiac monitoring, symptoms of electrolyte imbalances  - Administer electrolyte replacement as ordered  - Monitor response to electrolyte replacements, including rhythm and repeat lab results as appropriate  - Fluid restriction as ordered  - Instruct patient on fluid and nut for opioid side effects  - Notify MD/LIP if interventions unsuccessful or patient reports new pain  - Anticipate increased pain with activity and pre-medicate as appropriate  Outcome: Adequate for Discharge     Problem: SAFETY ADULT - FALL  Goal: Free from

## 2019-04-14 NOTE — PROGRESS NOTES
[facility]    Progress Note      Assessment and Plan:   1. Acute on chronic respiratory failure–the patient has multifactorial respiratory failure with severe end-stage COPD which profoundly limits patient's activities at baseline.   This i Component Value Date    WBC 20.1 04/14/2019    HGB 8.8 04/14/2019    HCT 29.6 04/14/2019    PLT 91.0 04/14/2019    CREATSERUM 1.49 04/14/2019    BUN 63 04/14/2019     04/14/2019    K 4.9 04/14/2019     04/14/2019    CO2 37.0 04/14/2019    GLU

## 2019-04-14 NOTE — CM/SW NOTE
Addendum 3:32pm    JANNA spoke with Stan Riojas from [place] [phone] who confirmed that she received the order form and satts test. Stan Riojas stated that the pt has enough 02 to get home and they will follow up with the pt tomorrow.      Addendum 2:41pm    Janna received th

## 2019-04-16 NOTE — PAYOR COMM NOTE
--------------  DISCHARGE REVIEW    Winnie Velarde MA [facility]  Subscriber #:  [id number]  Authorization Number: [id number]    Admit date: 4/1/19  Admit time:  6824  Discharge Date: 4/14/2019  6:06 PM     Admitting Physician: Chacha Merrill MD  Attending Physici Patient will be admitted to the hospital for further management.     DISCHARGE DX:    Acute on chronic hypoxemic respiratory failure.    - Patient is on 4 L of oxygen chronically at home  - Current exacerbation due to combination of this, marketed anemia, r fields  LUNGS:  No audible wheezing  ABDOMEN: Non-distended  EXTREMITIES: There was no edema  NEUROLOGICAL:  There was no focal deficit. CULTURE:   No results found for this visit on 04/01/19.     IMAGING STUDIES: SOME MAY NEED FOLLOW UP WITH PCP   Us Ve HGB 8.8 (L) 04/14/2019    HCT 29.6 (L) 04/14/2019    PLT 91.0 (L) 04/14/2019    CREATSERUM 1.49 (H) 04/14/2019    BUN 63 (H) 04/14/2019     04/14/2019    K 4.9 04/14/2019     04/14/2019    CO2 37.0 (H) 04/14/2019    GLU 40 (LL) 04/14/2019    CA HFA      Inhale 2 puffs into the lungs every 4 (four) hours as needed for Wheezing. Refills:  0     atorvastatin 80 MG Tabs  Commonly known as:  LIPITOR      80 mg nightly.    Refills:  1     bumetanide 2 MG Tabs  Commonly known as:  BUMEX      Take 2 mg as:   DELTASONE      Take 1 tablet (20 mg total) by mouth daily with breakfast.   Quantity:  10 tablet  Refills:  0     Prochlorperazine Maleate 10 mg tablet  Commonly known as:  COMPAZINE      Take 1 tablet (10 mg total) by mouth every 6 (six) hours as nee SPENT ON THIS DC   Trevon Vasquez  4/14/2019  12:17 PM      SW NOTE  Addendum 3:32pm     JANNA spoke with Brandy Moya from [place] [phone] who confirmed that she received the order form and satts test. Brandy Moya stated that the pt has enough 02 to get home and they wi

## 2019-04-22 NOTE — PROGRESS NOTES
Cancer Center Progress Note    Patient Name: Malcolm Noriega   YOB: 1940   Medical Record Number: [id number]   Attending Physician: Gabriela Lawton M.D.      Chief Complaint:  Lung cancer, liver metastasis    History of Present Illness:  Cancer file      Number of children: Not on file      Years of education: Not on file      Highest education level: Not on file    Occupational History      Not on file    Social Needs      Financial resource strain: Not on file      Food insecurity:        Worry (1 mg total) by mouth daily. , Disp: 90 tablet, Rfl: 2  •  dexamethasone (DECADRON) 4 MG tablet, Take 4 mg twice daily the day before chemotherapy, the day of chemotherapy, and the day after chemotherapy only, Disp: 12 tablet, Rfl: 2  •  ferrous sulfate 325 (LIPITOR) 80 MG Oral Tab, 80 mg nightly.  , Disp: , Rfl: 1  •  Roflumilast 500 MCG Oral Tab, Take 250 mcg by mouth daily.   , Disp: , Rfl:     Allergies:  No Known Allergies     Review of Systems:  All other systems reviewed and negative x12    Vital Signs: of the lung with liver metastasis PD-L1 0% EGFR ROS 1 ALK BRAF negative. He was diagnosed at the end of January 2019. Tissue was obtained via liver biopsy. –Started carboplatin pemetrexed and pembolizumab February 2019.   He is received 2 cycles to date

## 2019-05-09 NOTE — TELEPHONE ENCOUNTER
Son Doneta Robinson calling asking about dads CT scan. He said this was to be done prior to his appt with Dr Davonte Thomas along with labs.  Labs are scheduled before appt with Dr Davonte Thomas. Nadeem Beal

## 2019-05-09 NOTE — TELEPHONE ENCOUNTER
[facility], set up CT scan for his dad. 5/11, 11am in [place], NPO 2 hours prior. He verbalized understanding.

## 2019-05-13 PROBLEM — N18.9 CKD (CHRONIC KIDNEY DISEASE): Status: ACTIVE | Noted: 2019-01-01

## 2019-05-13 NOTE — PROGRESS NOTES
Cancer Center Progress Note    Patient Name: Fortunato Blood   YOB: 1940   Medical Record Number: [id number]   Attending Physician: Jaclyn Skaggs M.D.      Chief Complaint:  Lung cancer, liver metastasis    History of Present Illness:  Cancer Highest education level: Not on file    Occupational History      Not on file    Social Needs      Financial resource strain: Not on file      Food insecurity:        Worry: Not on file        Inability: Not on file      Transportation needs:        Antonette Erazo Disp: , Rfl:   •  Prochlorperazine Maleate (COMPAZINE) 10 mg tablet, Take 1 tablet (10 mg total) by mouth every 6 (six) hours as needed for Nausea., Disp: 60 tablet, Rfl: 3  •  Ondansetron HCl (ZOFRAN) 8 MG tablet, Take 1 tablet (8 mg total) by mouth every Budesonide-Formoterol Fumarate (SYMBICORT) 160-4.5 MCG/ACT Inhalation Aerosol, Inhale 2 puffs into the lungs 2 (two) times daily. , Disp: , Rfl:   •  MetFORMIN HCl (GLUCOPHAGE) 500 MG Oral Tab, 500 mg 2 (two) times daily with meals.   , Disp: , Rfl:   •  [place] Net 05/13/2019    TP 6.7 05/13/2019    ALB 2.4 (L) 05/13/2019    GLOBULIN 4.3 05/13/2019     05/13/2019    K 4.9 05/13/2019     05/13/2019    CO2 30.0 05/13/2019       Radiology:  none    Cancer Staging  No matching staging information was found fo

## 2019-05-14 PROBLEM — E61.1 IRON DEFICIENCY: Status: ACTIVE | Noted: 2019-01-01

## 2019-05-20 NOTE — TELEPHONE ENCOUNTER
Spoke to [place], offered an appt on Wed with Dr Bri Bazan if he would like to review CT in further detail, he declined this, he expresses \"I'm just worried about his back pain. \"  I explained that Dr Bri Bazan would like his dad to be seen by the palliative care NP t

## 2019-05-20 NOTE — TELEPHONE ENCOUNTER
Maxwell Elsie calling his father is having lower back pain son does not know the pain scale because he says dad never complains.  alfonso

## 2019-05-20 NOTE — TELEPHONE ENCOUNTER
Toxicities:  Due for C3D1 Carboplatin/Pembrolizumab & Faraheme on 5/22/2019    Back Pain     Back Pain: Miya Burnham reports he has been having intermittent, sharp stabbing pain in the middle of his middle back by his spine. The pain started on Saturday.  He den

## 2019-05-22 NOTE — CONSULTS
Palliative Care Consult Note     Patient Name: Robson Melendrez   YOB: 1940   Medical Record Number: [id number]   Date of visit: 5/22/2019     Chief Complaint/Reason for Visit:  Patient presents with:  Palliative Care    Reason for Consultat thromboembolism) (HCC)     PSVT (paroxysmal supraventricular tachycardia) (HCC)     NSVT (nonsustained ventricular tachycardia) (HCC)     Acute exacerbation of chronic obstructive pulmonary disease (COPD) (HCC)     Atrial fibrillation, persistent ([facility] [address] history: Patient lives with his wife Clark Tobar. Tj Goddard and Clark Tobar have been  for over 48 years and have 2 children, Kiran and [address]). Tj Goddard quit smoking cigarettes 25 years ago, denies ETOH use and denies illicit drug use.      Functional status: needed for Nausea. Disp: 60 tablet Rfl: 3   Ondansetron HCl (ZOFRAN) 8 MG tablet Take 1 tablet (8 mg total) by mouth every 8 (eight) hours as needed for Nausea. Disp: 30 tablet Rfl: 3   folic acid 1 MG Oral Tab Take 1 tablet (1 mg total) by mouth daily.  Miri Vizcaino with meals. Disp:  Rfl:    Levothyroxine Sodium (SYNTHROID) 50 MCG Oral Tab Take 50 mcg by mouth before breakfast.   Disp:  Rfl: 1   Atorvastatin Calcium (LIPITOR) 80 MG Oral Tab 80 mg nightly.    Disp:  Rfl: 1   Roflumilast 500 MCG Oral Tab Take 250 mcg nervous/anxious (During episodes of severe dyspnea; anxiety is relieved by rest). The patient does not have insomnia. Physical Examination:  Physical Exam   Constitutional: He is oriented to person, place, and time.  He appears well-developed and wel directed  -Has been on long-term prednisone for \"years\"    2. Cancer related pain  -Back pain  -Controllable per pt- no meds required  -Pain resolved by rest  -Suggested Tylenol 500-1000mg Q6H/PRN, if needed    3.  Goals of care, counseling/discussion  -P

## 2019-05-22 NOTE — PROGRESS NOTES
Pt here for C3D1 Keytruda, + Feraheme 1 of 2. 2nd feraheme to be given with cycle 4 [employer] ([place] Observed for 30 minutes post feraheme. Post vital signs wnl. Appeared to tolerate with no s/s of adverse reaction noted.    ( awaiting insurance approval for adair alvin

## 2019-05-22 NOTE — PATIENT INSTRUCTIONS
-Please call Ramona Machado with any Palliative Care concerns/questions @ [phone]    -Follow up with Ramona Machado in 3-4 weeks

## 2019-05-24 NOTE — TELEPHONE ENCOUNTER
Son, Redell Jeans requesting a call stts pt received noticed that iron injection was declined by insurance and they were wondering why since he needs it. pls call Redell Jeans to discuss, if no answer ok to leave detailed.  Thank you

## 2019-05-30 NOTE — ED INITIAL ASSESSMENT (HPI)
Pt to ER with c/o worsening atraumatic mid lower back pain. Pt denies fever. Pt denies dysuria or hematuria.

## 2019-05-31 NOTE — ED PROVIDER NOTES
Patient Seen in: [facility] Emergency Department    History   Patient presents with:  Back Pain (musculoskeletal)    Stated Complaint: Back pain    HPI    Chief complaint: Back pain    History of present illness:   The patient complains of back pain, tablet (1 mg total) by mouth daily.    dexamethasone (DECADRON) 4 MG tablet,  Take 4 mg twice daily the day before chemotherapy, the day of chemotherapy, and the day after chemotherapy only   ferrous sulfate 325 (65 FE) MG Oral Tab EC,  Take 1 tablet (325 m No    Drug use: No      Review of Systems    Positive for stated complaint: Back pain  Other systems are as noted in HPI. Constitutional and vital signs reviewed. All other systems reviewed and negative except as noted above.     PSFH elements reviewe Pema Chand MD on 5/30/2019 at 20:28     Approved by (CST): Pema Chand MD on 5/30/2019 at 20:34          Xr Lumbar Spine (min 2 Views) (cpt=72100)    Result Date: 5/30/2019  CONCLUSION:  1. No acute disease. 2. L2 kyphoplasty.  3. Demineralizat

## 2019-06-03 NOTE — TELEPHONE ENCOUNTER
Patient's son Kade Estrada called today to let me know that his father went to ER on Wednesday, May 30 with severe back pain. Kade Estrada states that patient was discharged home on [place] for pain and is going to see physical medicine for this back pain.  Med Mejia has been

## 2019-06-04 NOTE — TELEPHONE ENCOUNTER
I called Fabrice Adjutant ([phone]) back to update him on plan of care for [facility] AND [facility]. Dr. Moncho Boo ordered a bone scan to be done prior to his appointment (with Dr. Moncho Boo) next week.  Rose Chavez states that order is entered and the scan will have to be author

## 2019-06-11 PROBLEM — R09.02 HYPOXIA: Status: ACTIVE | Noted: 2019-01-01

## 2019-06-11 PROBLEM — E87.5 HYPERKALEMIA: Status: ACTIVE | Noted: 2019-01-01

## 2019-06-11 PROBLEM — I50.9 ACUTE ON CHRONIC CONGESTIVE HEART FAILURE, UNSPECIFIED HEART FAILURE TYPE (HCC): Status: ACTIVE | Noted: 2019-01-01

## 2019-06-11 NOTE — TELEPHONE ENCOUNTER
Spencer's son,Kiran states \" he is currently in the ER and I think he will be admitted. I'm unsure about his appointments for today, 6/11/19. \"

## 2019-06-11 NOTE — PROGRESS NOTES
[facility] HOSP - [facility]    Report of Consultation    Estefany Razo Patient Status:  Inpatient    1940 MRN [id number]   Location [facility] 1W Attending Carlyn Saucedo MD   Hosp Day # 0 PCP Arelis Contreras MD     Date of A Nebulization Continuous   Atropine Sulfate 0.1 MG/ML injection 0.5 mg 0.5 mg Intravenous PRN   nitroGLYCERIN (NITROSTAT) SL tab 0.4 mg 0.4 mg Sublingual Q5 Min PRN   Normal Saline Flush 0.9 % injection 10 mL 10 mL Intravenous PRN   bumetanide (BUMEX) 0.25 tablet Take 4 mg twice daily the day before chemotherapy, the day of chemotherapy, and the day after chemotherapy only   ferrous sulfate 325 (65 FE) MG Oral Tab EC Take 1 tablet (325 mg total) by mouth daily with breakfast.   dronedarone HCl 400 MG Oral Ta cough, wheezing, hemoptysis   GI: denies nausea, vomiting, abdominal pain  : denies dysuria, hematuria  Musculoskeletal: denies arthralgia, myalgia  Integumentary: denies rash, itching  Neurological: denies syncope, weakness, dizziness,   Psychiatric: de Approved by (CST): Joel Casanova MD on 6/11/2019 at 12:21          Nm Bone Scan  (cpt=78306)    Result Date: 6/11/2019  CONCLUSION:  1. Uptake in the thoracic spine consistent with compression fractures.   T11 uptake suggest presence of a relatively

## 2019-06-11 NOTE — H&P
[facility]    PATIENT'S NAME: Anh Sandoval   ATTENDING PHYSICIAN: Fabricio Lopez MD   PATIENT ACCOUNT#:   [de-identified]    LOCATION:  [place] [address]  MEDICAL RECORD #:   [id number]       YOB: 1940  ADMISSION DATE:       06/11 obstructive pulmonary disease, home oxygen-dependent and noninvasive ventilator at night.   He had coronary artery disease, status post coronary artery bypass graft surgery with left ventricular diastolic dysfunction and moderate pulmonary hypertension, rec currently on 4L nasal cannula oxygen. HEENT:  Atraumatic. Oropharynx clear, moist mucous membranes. Normal hard and soft palate. Eyes:  Anicteric sclerae. Pupils equal, round, and reactive to light and accommodation.   Ears, nose normal.  NECK:  Supple

## 2019-06-11 NOTE — ED INITIAL ASSESSMENT (HPI)
Patient arrives from hallway in the hospital where he was found to be hypoxic and tachycardic.  Patient had a few appointments this AM and his 02 tank possibly ran out of oxygen    Patient arrives pale in moderate distress,  stating \"I can't breathe\"    W

## 2019-06-11 NOTE — CONSULTS
[facility] Consult Note    Ilan Razo Patient Status:  Inpatient    1940 MRN [id number]   Location [facility] 1W Attending Kishor Rodriguez MD   Hosp Day # 0 PCP Arelis Contreras MD     78year old male, consulted fo infection despite normal to dry volume status. · Check echo  · No diuresis for now      Joanna Ayala.   [facility] Cardiology    --------------------------------------------------------------------------------------------------------------------------------

## 2019-06-11 NOTE — ED PROVIDER NOTES
Patient Seen in: [facility] AND [facility] Emergency Department    History   Patient presents with:  Dyspnea HECTOR SOB (respiratory)    Stated Complaint: hypoxia    HPI    26-year-old male with past medical history significant for CAD, status post CABG, COPD on 4 kg/m²         Physical Exam    Physical Exam   Constitutional: AAOx3, well nourished, moderate respiratory distress with abdominal retractions  Head: Normocephalic and atraumatic.    Ears: TM's clear b/l  Nose: Nose normal.   Mouth/Throat: MMM, post OP ki normal limits   BASIC METABOLIC PANEL (8) - Abnormal; Notable for the following components:    Glucose 399 (*)     Potassium 5.7 (*)     BUN 46 (*)     Creatinine 1.57 (*)     BUN/CREA Ratio 29.3 (*)     Calculated Osmolality 319 (*)     GFR, Non- A ACTIVATED - Normal   CBC WITH DIFFERENTIAL WITH PLATELET    Narrative: The following orders were created for panel order CBC WITH DIFFERENTIAL WITH PLATELET.   Procedure                               Abnormality         Status                     ------ of 5.8 as well as an elevated BNP of over 12,000. Patient has some mild congestive changes on chest x-ray. I believe most of his symptoms are due to COPD exacerbation. He has received an hour-long nebulizer treatment as well as steroids and Lasix.   Beatriz ICD-10-CM Noted POA    * (Principal) COPD exacerbation ([facility] [address] J44.1 1/4/2017 Unknown    Acute on chronic congestive heart failure, unspecified heart failure type ([facility] [address] I50.9 6/11/2019     Hyperkalemia E87.5 6/11/2019     Hypoxia R09.02 6/11/2019

## 2019-06-12 NOTE — PLAN OF CARE
Problem: Patient Centered Care  Goal: Patient preferences are identified and integrated in the patient's plan of care  Description  Interventions:  - What would you like us to know as we care for you?  I was having a hard time breathing  - Provide timely, needs  - Identify cognitive and physical deficits and behaviors that affect risk of falls.   - [place] fall precautions as indicated by assessment.  - Educate pt/family on patient safety including physical limitations  - Instruct pt to call for assistance

## 2019-06-12 NOTE — PROGRESS NOTES
[facility] HOSP - [facility]    Cardiology Progress Note    Beatrice Razo Patient Status:  Inpatient    1940 MRN [id number]   Location [facility] 3W/SW Attending Trung Hutchins MD   Hosp Day # 1 PCP Arelis Contreras MD SpO2 93 %.     Intake/Output:     Intake/Output Summary (Last 24 hours) at 6/12/2019 0949  Last data filed at 6/12/2019 0639  Gross per 24 hour   Intake 185 ml   Output 875 ml   Net -690 ml       TELE: NSR    Scheduled Meds:   • Insulin Aspart Pen  1-11 Uni mass, mediastinal lymph nodes, and left lower lobe nodule. Small bilateral pleural effusions. New pseudomass corresponding to loculated fluid in the superior margin of the right major fissure.   Extensive hepatic metastatic disease partially imaged althoug -------------------------- Sinus Tachycardia -Right bundle branch block with left axis -bifascicular block.  ABNORMAL When compared with ECG of 04/01/2019 12:22:34 right bundle branch block is new Electronically signed on 06/11/2019 at 15:31 by Angela Hussein

## 2019-06-12 NOTE — CONSULTS
Inpatient oncology consultation    Date of consultation 6/12/2019      Reason for evaluation  Lung cancer, liver metastasis acute on chronic respiratory failure with hypoxia, goals of care    History of Present Illness:  Cancer history:  49-year-old male f Inability: Not on file      Transportation needs:        Medical: Not on file        Non-medical: Not on file    Tobacco Use      Smoking status: Former Smoker      Smokeless tobacco: Never Used    Substance and Sexual Activity      Alcohol use: No      Dr distress. Psych:  Mood and affect appropriate  HEENT: EOMs intact. PERRL. Oropharynx is clear. Neck: No JVD. No palpable lymphadenopathy. Neck is supple. Lymphatics:  There is no palpable peripheral lymphadenopathy   Chest: Symmetric expansion, nonlabor performance status we would recommend hospice. Will have palliative care f/u    The patient's emotional well being was assessed and resources were discussed. Appropriate resources were reviewed and discussed with the pateint and family.      Anthony Borges,

## 2019-06-12 NOTE — PLAN OF CARE
Problem: Patient Centered Care  Goal: Patient preferences are identified and integrated in the patient's plan of care  Description  Interventions:  - What would you like us to know as we care for you?  I was having a hard time breathing  - Provide timely, measures as appropriate and evaluate response  - Consider cultural and social influences on pain and pain management  - Manage/alleviate anxiety  - Utilize distraction and/or relaxation techniques  - Monitor for opioid side effects  - Notify MD/LIP if inte of oxygen - wears 4L at home. Dyspnea with exertion. No c/o pain at this time. Fluid restrictions 1500mL. IV solu-medrol continued. Plan for echo today. Up with 1 + walker. Bed locked and in low position. Bed alarm on.   Call light and personal

## 2019-06-12 NOTE — PLAN OF CARE
Patient with elevated BP SBP ranging 150-160s, HR 90-120s  Reviewed patient medication list, was taking Dilt 180 mg po daily. Pt unsure if he was taking prior to hospitalzation.     AVS from last hospitalization 4/2019 - Pt was Discharged home on DIlt 180

## 2019-06-12 NOTE — PAYOR COMM NOTE
--------------  ADMISSION REVIEW     Jovanna Ohs [facility]  Subscriber #:  [id number]  Authorization Number: [id number]      Admit date: 6/11/19  Admit time: 18       Admitting Physician: Aixa Albright MD  Attending Physician:  MD JENELLE Saldivar Smoking status: Former Smoker      Smokeless tobacco: Never Used    Alcohol use: No    Drug use: No      Review of Systems    Positive for stated complaint: hypoxia  Other systems are as noted in HPI. Constitutional and vital signs reviewed.       All othe 47 (*)     GFR, -American 54 (*)     All other components within normal limits   PRO BETA NATRIURETIC PEPTIDE - Abnormal; Notable for the following components:    Pro-Beta Natriuretic Peptide 12,876 (*)     All other components within normal limits reaction remains. 3. Dorsal lumbar spondylosis, multilevel compression fracture deformities and kyphoplasty changes redemonstrated.     Dictated by (CST): Thao Carter MD on 6/11/2019 at 12:16     Approved by (CST): Thao Carter MD on 6/11/201 List        Present on Admission  Date Reviewed: 5/22/2019          ICD-10-CM Noted POA    COPD exacerbation ([facility] [address] J44.1 1/4/2017 Unknown            Present on Admission  Date Reviewed: 5/22/2019          ICD-10-CM Noted POA    COPD exacerbation ([facility] [address] J44. in left-sided pleural effusion, chronic right basilar pleural reaction remains, dorsal lumbar spondylosis, multilevel compression fractures and deformities, and kyphoplasty changes were demonstrated.   The patient was started on nebulizer treatments and Sol has become more pronounced in the last 2 to 3 days. Denies any orthopnea. No fever or chills. He does have sporadic cough nonproductive of phlegm. No chest pain, no abdominal pain, no recent sick contacts. Other 12-point review of systems negative. oxygen support, DVT prophylaxis. Monitor his respiratory status closely, monitor his Accu-Cheks, and supply insulin as needed. Further recommendations to follow.     Dictated By Aldair Olson MD  d: 06/11/2019 13:20:37  t: 06/11/2019 13:24:13  [facility] [address] Subcutaneous (Right Upper Arm) Sally Vicente RN      ipratropium-albuterol (DUONEB) nebulizer solution 3 mL     Date Action Dose Route User    6/12/2019 0117 Given 3 mL Nebulization Jeffrey Bright RCP    6/11/2019 2000 Given 3 mL Nebulization Mark 12k     CXR- bilateral interstitial opacities, either edema or bilateral pneumonia  EKG- sinus tachycardia, RBBB, no acute ST-T changes        Assessment and Plan:    · SOB - DDx includes PE, post-obstructive pneumonia, heart failure.   Exam not impressive anxiety  Hematologic: denies bruising           Physical Exam:   Blood pressure 134/54, pulse 101, temperature 97.7 °F (36.5 °C), temperature source Oral, resp.  rate 24, height 5' 5\" (1.651 m), weight 147 lb 4.8 oz (66.8 kg), SpO2 94 %.     Constitutional cancer and so VTE a strong possibility. ? CT chest with increased size in Mass, small bilateral pleural effusions  ? LE dopplers negative  ? VQ scan Pending  ?  Cont to hold diuresis with rising BUN/Creat     · HFrEF - EF has been mildly reduced with septa HGB 9.2 (L) 06/12/2019     HCT 30.9 (L) 06/12/2019     .0 (L) 06/12/2019     CREATSERUM 1.57 (H) 06/12/2019     BUN 46 (H) 06/12/2019      06/12/2019     K 5.7 (H) 06/12/2019      06/12/2019     CO2 31.0 06/12/2019      (H) 06/12/

## 2019-06-12 NOTE — CARDIAC REHAB
CARDIAC REHAB HEART FAILURE EDUCATION    Handouts provided and reviewed: CHF Booklet.      Activity: Chair for all meals: yes       Ambulation: up to bathroom with walker and O2       Tolerated Activity: has difficulty, SOB with exertion         Disease Pro

## 2019-06-12 NOTE — PROGRESS NOTES
[facility]    Progress Note    Sina Razo Patient Status:  Inpatient    1940 MRN [id number]   Location [facility] 3W/SW Attending Madeline Ferris MD   Hosp Day # 1 PCP MD Elo Archibald LFT, coags    Diabetes mellitus type 2., unc., A1c~8  On 70/30 and metformin at home  Expected incr of hyperglyemia with steroids  -adjust insulins    Chronic anemia of malignancy  -monitor    H/o c.  Diff few months ago    DVT proph: heparin sq    Dispo: p Tortuous atherosclerotic aorta. CABG 2. Chronic diffuse pulmonary interstitial prominence most pronounced at the bases. Left-sided effusion has resolved. Chronic right basilar pleural reaction remains.  3. Dorsal lumbar spondylosis, multilevel compressio

## 2019-06-12 NOTE — CM/SW NOTE
SW received MDO to assess for HHC. SW met w/ pt to discuss eventual discharge needs. Pt's wife was also present. Pt lives at home w/ his wife in [place] [place] [facility]. Pt lives in a 1 level ranch house w/ 2 steps. Pt has a son who lives nearby.  Pt owns home O2 &

## 2019-06-12 NOTE — PLAN OF CARE
Problem: Patient Centered Care  Goal: Patient preferences are identified and integrated in the patient's plan of care  Description  Interventions:  - What would you like us to know as we care for you?  I was having a hard time breathing  - Provide timely,

## 2019-06-12 NOTE — RESPIRATORY THERAPY NOTE
Patient seen for Alen Anand education. Spacer provided for MDI,  He has not been taking Symbicort on a daily basis but instead taking breathing treatments on regular basis. Encouraged a daily routine for Symbicort but patient doesn't feel it helps.    jessica rooney

## 2019-06-12 NOTE — PROGRESS NOTES
[facility]    Progress Note      Assessment and Plan:     1. Acute on chronic respiratory failure– the patient has end-stage COPD with very tenuous respiratory status at baseline. His chest x-ray does not look bad.   His CT scan the chest 06/12/2019     06/12/2019    CA 8.5 06/12/2019     Bone scan–no evidence of distant metastasis.     Ventilation/perfusion scan–low probability    Nancy George MD  Medical Director, Critical Care, [facility]  Medical Director, [facility]

## 2019-06-13 PROBLEM — Z71.89 ADVANCE CARE PLANNING: Status: ACTIVE | Noted: 2019-01-01

## 2019-06-13 PROBLEM — R06.00 DYSPNEA: Status: ACTIVE | Noted: 2019-01-01

## 2019-06-13 NOTE — DIETARY NOTE
ADULT NUTRITION INITIAL ASSESSMENT    Pt is at moderate nutrition risk. Pt does not meet malnutrition criteria.       RECOMMENDATIONS TO MD:  See Nutrition Intervention     NUTRITION DIAGNOSIS/PROBLEM:  Increased nutrient needs related to physiological cau Note & planned Hospice meeting tomorrow with pt and family, monitor plans    ADMITTING DIAGNOSIS:   Hyperkalemia [E87.5]  Hypoxia [R09.02]  COP[facility] ([facility] [J44.1]  Acute on chronic congestive heart failure, unspecified heart failure type ([facility] [H3 Q12H   • diltiazem  180 mg Oral Daily   • ipratropium-albuterol  3 mL Nebulization Q6H   • Heparin Sodium (Porcine)  5,000 Units Subcutaneous 2 times per day   • atorvastatin  80 mg Oral Nightly   • dronedarone HCl  400 mg Oral BID with meals   • ferrous s

## 2019-06-13 NOTE — RESPIRATORY THERAPY NOTE
Seen patient ,  Pulmonary Rehab Program explained , brochure given . Instructed patient to discuss rehab options with Doctor after discharge, and obtain an order if appropriate.

## 2019-06-13 NOTE — PROGRESS NOTES
[facility]    Progress Note    Raquel Razo Patient Status:  Inpatient    1940 MRN [id number]   Location [facility] 3W/SW Attending Kar Grant MD   Hosp Day # 2 PCP MD Jeannette Archibald insufficiency.     Metastatic lung adenocarcinoma with mets to the liver, diagnosed [id number]  S/p 2 cycle of chemo, complicated by severe anemia, and respiratory failure s/p bone scan 6/11 to evaluate for metastasis to the lumbar spine (recent back pains) embolism.     Dictated by (CST): Arash Lama MD on 6/12/2019 at 9:53     Approved by (CST): Arash Lama MD on 6/12/2019 at 9:57                  Radha Govea MD  6/13/2019

## 2019-06-13 NOTE — PROGRESS NOTES
[facility] HOSP - [facility]    Hematology/Oncology   Progress Note    Whitley Razo Patient Status:  Inpatient    1940 MRN [id number]   Location [facility] 3W/SW Attending Shawn Gilbert MD   Hosp Day # 2 PCP Arelis Ogden Nate eRno MD on 6/11/2019 at 16:47          Us Venous Doppler Leg Bilat - Diag Img (cpt=93970)    Result Date: 6/12/2019  CONCLUSION:   No DVT in either lower extremity.   1.9 cm left Baker's cyst.     Dictated by (CST): Anali Armijo MD on 6/12/2019 lung with liver metastasis PD-L1 0% EGFR ROS 1 ALK BRAF negative. He was diagnosed at the end of January 2019. Tissue was obtained via liver biopsy. –Started carboplatin pemetrexed and pembolizumab February 2019.   He is received 2 cycles to date camron

## 2019-06-13 NOTE — PLAN OF CARE
Problem: Patient Centered Care  Goal: Patient preferences are identified and integrated in the patient's plan of care  Description  Interventions:  - What would you like us to know as we care for you?  I was having a hard time breathing  - Provide timely, needs  - Identify cognitive and physical deficits and behaviors that affect risk of falls.   - [place] fall precautions as indicated by assessment.  - Educate pt/family on patient safety including physical limitations  - Instruct pt to call for assistance and family perspectives and choices   Outcome: Progressing     Problem: Diabetes/Glucose Control  Goal: Glucose maintained within prescribed range  Description  INTERVENTIONS:  - Monitor Blood Glucose as ordered  - Assess for signs and symptoms of hypergly home or other facility with appropriate resources  Description  INTERVENTIONS:  - Identify barriers to discharge w/pt and caregiver  - Include patient/family/discharge partner in discharge planning  - Arrange for needed discharge resources and transportati

## 2019-06-13 NOTE — CM/SW NOTE
SW received order for hospice. SW spoke w/ Sheldon Mendoza from [facility]. and made referral. Plan for hospice meeting on 6/14 at noon. Per daughter request, hospice list provided.      Quentin Tapia

## 2019-06-13 NOTE — PLAN OF CARE
Problem: Patient Centered Care  Goal: Patient preferences are identified and integrated in the patient's plan of care  Description  Interventions:  - What would you like us to know as we care for you?  I was having a hard time breathing  - Provide timely, measures as appropriate and evaluate response  - Consider cultural and social influences on pain and pain management  - Manage/alleviate anxiety  - Utilize distraction and/or relaxation techniques  - Monitor for opioid side effects  - Notify MD/LIP if inte CP but continues to have SOB with exertion. Palliative consulted and added morphine to decrease pain with breathing and added stool softeners.  Plan for residential hospice to talk to patient and family tomorrow at noon regarding their services and to give

## 2019-06-13 NOTE — CONSULTS
[address] [place] [address] Tony  Donovanmelchor Patient Status:  Inpatient    1940 MRN [id number]   Location [facility] 3W/SW Attending Idris Duncan MD   Hosp Day # 2 PCP Arelis Contreras MD oriented X4 and very pleasant. Breathing appears non-labored currently on nc4L. He reports chronic dyspnea symptoms at rest and with minimal movements which are bothersome to him. Denies any cough.  I talked with pt about trial of low dose Roxanol prn to he symptoms and decline over time. I discussed options for ongoing supportive care with palliative care vs. comfort care with hospice. Pt tells me about his conversation with Dr. Trent Maxwell and his family last night.  He says he is not going to receive any further c History:  Smoking Status: former  Hx of Substance Use/Abuse: no    Pentecostalism/Cultural Information  Pentecostalism Affiliation: [facility]   requested: declined  Ethnicity: -greek    Allergies:  No Known Allergies    Medications:     Current Nebulization, QID PRN  •  Albuterol Sulfate  (90 Base) MCG/ACT inhaler 2 puff, 2 puff, Inhalation, Q4H PRN  •  atorvastatin (LIPITOR) tab 80 mg, 80 mg, Oral, Nightly  •  dronedarone HCl (MULTAQ) tab 400 mg, 400 mg, Oral, BID with meals  •  ferrous s bilateral pleural effusions. New pseudomass corresponding to loculated fluid in the superior margin of the right major fissure. Extensive hepatic metastatic disease partially imaged although appears more confluent/progressed.   Dictated by (CST): Tushar Monson disease.     Dictated by (CST): Manuela Zayas MD on 6/11/2019 at 13:42     Approved by (CST): Michelle Ramírez MD on 6/11/2019 at 13:50            Objective:  Vital Signs:  Blood pressure 145/59, pulse 80, temperature 97.8 °F (36.6 °C), tem non-pharmacological measures to alleviate dyspnea    COPD exacerbation (HCC)  -per pulmonary    Acute on chronic congestive heart failure, unspecified heart failure type ([facility] [address]    Metastatic lung adenocarcinoma with mets to liver dx 1/19  -per Dr. Elsa Drake

## 2019-06-13 NOTE — PROGRESS NOTES
[facility] AND [facility]  Progress Note    Zahra Razo Patient Status:  Inpatient    1940 MRN [id number]   Location [facility] 3W/SW Attending Flex Bee MD   Hosp Day # 2 PCP Arelis Contreras MD     Assessment:    1.   Res BUN 52 06/13/2019    CREATSERUM 1.54 06/13/2019     06/13/2019    MG 2.2 06/13/2019    CA 8.1 06/13/2019    ALT 54 06/13/2019    AST 31 06/13/2019    ALB 2.3 06/13/2019    ALB 2.3 06/13/2019        Lab Results   Component Value Date    TROP <0.045 0

## 2019-06-14 NOTE — PROGRESS NOTES
[facility] HOSP - [facility]    Hematology/Oncology   Progress Note    Miles Razo Patient Status:  Inpatient    1940 MRN [id number]   Location [facility] 3W/SW Attending Cristy Garcia MD   Hosp Day # 3 PCP Arelis Ogden chronic respiratory failure with hypoxia     Acute on chronic respiratory failure with hypoxia with progression of disease radiographically.   Given PDL 1 is 0% and the patient has progression of disease with poor performance status we would recommend hospi

## 2019-06-14 NOTE — HOSPICE RN NOTE
Met with patient, spouse and daughter to discuss hospice services and philosophy. Patient is Medicare hospice eligible with a dx of lung ca with mets. Patient is currently eligible for routine hospice home care. Medications and DME discussed with family.  Joanne Lawson

## 2019-06-14 NOTE — SPIRITUAL CARE NOTE
Pt was in bed and wife at bedside at the time of visit. Pt requested the  to pray for him to get well soon.  ML

## 2019-06-14 NOTE — PROGRESS NOTES
[facility]    Progress Note      Assessment and Plan:     1. Acute on chronic respiratory failure– the patient has end-stage COPD with very tenuous respiratory status at baseline. His chest x-ray does not look bad.   His CT scan the chest HGB 9.6 06/13/2019    HCT 32.5 06/13/2019    .0 06/13/2019    CREATSERUM 1.54 06/13/2019    BUN 52 06/13/2019     06/13/2019    K 5.2 06/13/2019     06/13/2019    CO2 32.0 06/13/2019     06/13/2019    CA 8.1 06/13/2019    ALB 2

## 2019-06-14 NOTE — PALLIATIVE CARE NOTE
[facility] HOSP - [facility]  Palliative Care Follow Up    [address] Patient Status:  Inpatient    1940 MRN [id number]   Location [facility] 3W/SW Attending Ashlyn Ellsworth MD   Hosp Day # 3 PCP Arelis Contreras MD     D Subcutaneous, Nightly  •  ipratropium-albuterol (DUONEB) nebulizer solution 3 mL, 3 mL, Nebulization, TID  •  Insulin Aspart Pen (NOVOLOG) 100 UNIT/ML flexpen 6 Units, 6 Units, Subcutaneous, TID CC  •  Insulin Aspart Pen (NOVOLOG) 100 UNIT/ML flexpen 1-7 U Oral, Q8H PRN  •  Pantoprazole Sodium (PROTONIX) EC tab 40 mg, 40 mg, Oral, QAM AC  •  Prochlorperazine Maleate (COMPAZINE) tab 10 mg, 10 mg, Oral, Q6H PRN  •  Fluticasone Furoate-Vilanterol (BREO ELLIPTA) 200-25 MCG/INH inhaler 1 puff, 1 puff, Inhalation, Chest Ap Portable  (cpt=71045)    Result Date: 6/11/2019  CONCLUSION:  1. Mild cardiomegaly. Tortuous atherosclerotic aorta. CABG 2. Chronic diffuse pulmonary interstitial prominence most pronounced at the bases. Left-sided effusion has resolved.   Chron gallop. Lungs: Barrel chest noted, diminished bilaterally. Normal excursions and effort. Abdomen: Soft, non-tender, normal bowel sounds X 4 quadrants, no rebound or guarding  Extremities: Without clubbing, cyanosis. Peripheral pulses are 2+.  BLE Edema n not chosen at this time, recommend community palliative care with bridge to hospice care.    -Agreeable to palliative care following  -Dispo: possible home hospice care pending meeting with hospice today  -Provided emotional support to pt  who is coping rosalino

## 2019-06-14 NOTE — PROGRESS NOTES
[facility]    Progress Note    Ana Razo Patient Status:  Inpatient    1940 MRN [id number]   Location [facility] 3W/SW Attending Rossana Lunsford MD   Hosp Day # 3 PCP Arelis Contreras MD       [facility] and cardizem  Now off eliquis b/c chemotherapy/recent cancer  -monitor rates on tele    Mild hyperkalemia. Mild renal insufficiency.     Metastatic lung adenocarcinoma with mets to the liver, diagnosed [id number]  S/p 2 cycle of chemo, complicated by severe

## 2019-06-14 NOTE — RESPIRATORY THERAPY NOTE
Patient seen for COPD education follow up. Patient was given IS/Acapella. He has it packed up for home.   He was encouraged to use it while in the hospital too

## 2019-06-14 NOTE — PROGRESS NOTES
[facility]     Progress Note        Rani Razo Patient Status:  Inpatient    1940 MRN [id number]   Location [facility] 3W/SW Attending Reuben Watson MD   Hosp Day # 3 PCP MD Ernestina Archibald Flush 0.9 % injection 10 mL 10 mL Intravenous PRN   dextrose 50 % injection 50 mL 50 mL Intravenous PRN   Glucose-Vitamin C (DEX-4) 4-6 GM-MG chewable tab 4 tablet 4 tablet Oral Q15 Min PRN   glucose (DEX4) oral liquid 15 g 15 g Oral Q15 Min PRN   ipratrop COPD with likely acute exacerbation  4. Leukocytosis  5.   Chronic diastolic heart failure     Plan   -Patient with underlying history of COPD, stage IV adenocarcinoma lung, diastolic CHF who presents with worsening dyspnea progressively worsening  -CT dejuan

## 2019-06-14 NOTE — PLAN OF CARE
Problem: Patient Centered Care  Goal: Patient preferences are identified and integrated in the patient's plan of care  Description  Interventions:  - What would you like us to know as we care for you?  I was having a hard time breathing  - Provide timely, needs  - Identify cognitive and physical deficits and behaviors that affect risk of falls.   - [place] fall precautions as indicated by assessment.  - Educate pt/family on patient safety including physical limitations  - Instruct pt to call for assistance

## 2019-06-14 NOTE — TELEPHONE ENCOUNTER
Kiran requesting a call back to discuss pt. shannon CAROLINA saw pt yesterday while he was inpatient and wants to get an update and also has questions. pls call.  Thank you

## 2019-06-15 NOTE — PROGRESS NOTES
Pt A/O x , RA, denies pain and shortness of breath. Pt to be discharged home today with home health as ordered. Discharge instructions, medications, prescriptions, home health, and follow up appointments reviewed with pt and family.  Pt verbalized Anna Grewal

## 2019-06-15 NOTE — PLAN OF CARE
Problem: Patient Centered Care  Goal: Patient preferences are identified and integrated in the patient's plan of care  Description  Interventions:  - What would you like us to know as we care for you?  I was having a hard time breathing  - Provide timely, measures as appropriate and evaluate response  - Consider cultural and social influences on pain and pain management  - Manage/alleviate anxiety  - Utilize distraction and/or relaxation techniques  - Monitor for opioid side effects  - Notify MD/LIP if inte

## 2019-06-15 NOTE — CM/SW NOTE
06/15/19 1500   Discharge disposition   Expected discharge disposition Home-Health   Name of Facillity/Home Care/Hospice   [facility] [phone])   Discharge transportation Private car   MDO received for discharge, Patient  Discharging

## 2019-06-15 NOTE — PROGRESS NOTES
Pulmonary/Critical Care Follow Up Note    HPI:   Kaykay Barragan is a 78year old male with Patient presents with:  Dyspnea HECTOR SOB (respiratory)      PCP Arelis Contreras MD  Admission Attending Sherrie Sanon 15 Day #4    Sob  No mL, Intravenous, PRN  •  dextrose 50 % injection 50 mL, 50 mL, Intravenous, PRN  •  Glucose-Vitamin C (DEX-4) 4-6 GM-MG chewable tab 4 tablet, 4 tablet, Oral, Q15 Min PRN  •  glucose (DEX4) oral liquid 15 g, 15 g, Oral, Q15 Min PRN  •  ipratropium-albutero nad  Lung very quiet  cv reg   abd soft +bs  Ext trace edema       LABS:             ASSESSMENT/PLAN:     1.  Acute on chronic respiratory failure  Still SOB  Plan nebs   Breo   Pred and wean of off as tolerated    hospice         2.  Metastatic adenoc

## 2019-06-15 NOTE — PLAN OF CARE
Problem: Patient Centered Care  Goal: Patient preferences are identified and integrated in the patient's plan of care  Description  Interventions:  - What would you like us to know as we care for you?  I was having a hard time breathing  - Provide timely, measures as appropriate and evaluate response  - Consider cultural and social influences on pain and pain management  - Manage/alleviate anxiety  - Utilize distraction and/or relaxation techniques  - Monitor for opioid side effects  - Notify MD/LIP if inte reviewed before administation, safety, fall risk, and poc reviewed with pt. Pt able to turn self in bed. Up with assist frequently. Up to chair/BR prn . Encouraged pt to change position frequently to prevent skin breakdown.   Pain managed with prn medicati

## 2019-06-15 NOTE — PLAN OF CARE
Problem: Patient Centered Care  Goal: Patient preferences are identified and integrated in the patient's plan of care  Description  Interventions:  - What would you like us to know as we care for you?  I was having a hard time breathing  - Provide timely, PAIN - ADULT  Goal: Verbalizes/displays adequate comfort level or patient's stated pain goal  Description  INTERVENTIONS:  - Encourage pt to monitor pain and request assistance  - Assess pain using appropriate pain scale  - Administer analgesics based on t caregiver  - Include patient/family/discharge partner in discharge planning  - Arrange for needed discharge resources and transportation as appropriate  - Identify discharge learning needs (meds, wound care, etc)  - Arrange for interpreters to assist at [facility]

## 2019-06-16 NOTE — DISCHARGE SUMMARY
[facility] HOSPITALIST  DISCHARGE SUMMARY     Israel Razo Patient Status:  Inpatient    1940 MRN [id number]   [facility] 3W/ Attending No att. providers found   Hosp Day # 4 PCP Arelis Contreras MD     Date of Admiss chronic diffuse pulmonary interstitial prominence, improvement in left-sided pleural effusion, chronic right basilar pleural reaction remains, dorsal lumbar spondylosis, multilevel compression fractures and deformities, and kyphoplasty changes were demonst Patient and family had informational meeting with residential hospice, leaning towards hospice, however need more time to check with other options/agencies. Community palliative care follow-up. Home with home health.   Patient says, he already has oxygen, nebulization 4 (four) times daily as needed for Wheezing. Refills:  0     PROAIR  (90 Base) MCG/ACT Aers  Generic drug:  Albuterol Sulfate HFA      Inhale 2 puffs into the lungs every 4 (four) hours as needed for Wheezing.    Refills:  0     atorva 250 mcg by mouth daily. Refills:  0     SYMBICORT 160-4.5 MCG/ACT Aero  Generic drug:  Budesonide-Formoterol Fumarate      Inhale 2 puffs into the lungs 2 (two) times daily.    Refills:  0     Vitamin D3 29311 units Caps      Take 50,000 Units by mouth on minutes

## 2019-06-17 PROBLEM — D69.6 THROMBOCYTOPENIA (HCC): Status: ACTIVE | Noted: 2019-01-01

## 2019-06-17 PROBLEM — N17.9 ACUTE KIDNEY INJURY (HCC): Status: ACTIVE | Noted: 2019-01-01

## 2019-06-17 PROBLEM — C34.90 PRIMARY MALIGNANT NEOPLASM OF LUNG METASTATIC TO OTHER SITE, UNSPECIFIED LATERALITY (HCC): Status: ACTIVE | Noted: 2019-01-01

## 2019-06-17 PROBLEM — D72.829 LEUKOCYTOSIS: Status: ACTIVE | Noted: 2019-01-01

## 2019-06-17 PROBLEM — R10.9 ABDOMINAL PAIN, ACUTE: Status: ACTIVE | Noted: 2019-01-01

## 2019-06-17 PROBLEM — M54.9 BACK PAIN: Status: ACTIVE | Noted: 2019-01-01

## 2019-06-17 NOTE — PLAN OF CARE
Patient arrived to unit from Emergency Department. Vital signs taken and assessment completed. Payam Blackman, [facility] RN present at bedside to admit patient into general inpatient hospice.   See Hospice admission for detailed assessment and plan of c

## 2019-06-17 NOTE — CONSULTS
[facility] - [facility]    Report of Consultation    Date of Admission:  6/17/2019  Date of Consult:  6/17/2019   Reason for Consultation:     Hyperkalemia and CLAUDETTE     History of Present Illness:     Patient is a 78 yrs old male with h/o stage IV meta and visual disturbance  Ears, nose, mouth, throat, and face: negative for hearing loss and sore throat  Respiratory: negative for cough, hemoptysis and wheezing  Cardiovascular: negative for chest pain, exertional dyspnea   Gastrointestinal: +ve abdominal 14.30* 19.22* 27.81*   WBC 15.4* 21.5* 32.2*   .0* 135.0* 109.0*     Recent Labs   Lab 06/12/19  0559 06/13/19  0558 06/17/19  1119   * 149* 360*   BUN 46* 52* 68*   CREATSERUM 1.57* 1.54* 1.94*   GFRAA 48* 49* 37*   GFRNAA 41* 42* 32*   CA 8

## 2019-06-17 NOTE — PLAN OF CARE
Pt flipped to inpatient hospice today. Dilaudid gtt initiated and titrated as needed. Right chest port accessed. Dilaudid IVP given as needed. Indwelling lopez in place. General diet, comfort feeds. Scopolamine patch applied behind right ear.  Matthias Smith

## 2019-06-17 NOTE — ED PROVIDER NOTES
Patient Seen in: [facility] 4w/sw/se    History   Patient presents with:  Dyspnea HECTOR SOB (respiratory)  Abdominal Pain    Stated Complaint: sob    HPI    The patient is a 63-year-old male with a history of stage IV metastatic lung cancer who pres (Room air)       Current:/76   Pulse (!) 122   Temp 98.2 °F (36.8 °C)   Resp 20   Ht 165.1 cm (5' 5\")   Wt 65.8 kg   SpO2 96%   BMI 24.13 kg/m²         Physical Exam   Constitutional: He is oriented to person, place, and time.  He appears well-develo Pro-Beta Natriuretic Peptide 14,100 (*)     All other components within normal limits   URINALYSIS WITH CULTURE REFLEX - Abnormal; Notable for the following components:    Clarity Urine Hazy (*)     Protein Urine >=500 (*)     Ketones Urine Trace (*)     B cancer and hyperkalemia. This involved direct patient intervention, complex decision making, and/or extensive discussions with the patient, family, and clinical staff.        initially I started treatment for hyperkalemia but after discussion with patient' (cpt=71045)    Result Date: 6/17/2019  CONCLUSION:  1. No acute findings.     Dictated by (CST): Kinga Eller MD on 6/17/2019 at 10:07     Approved by (CST): Kinga Eller MD on 6/17/2019 at 10:12            Radiology exams  [facility] D69.6 6/17/2019 Yes

## 2019-06-17 NOTE — ED INITIAL ASSESSMENT (HPI)
Via EMS from home, discharged from this hospital yesterday. Patient arrives with c/o SOB and constipation.  Last BM yesterday morning    Patient reports he is always SOB, but worsened last night    Wears home 02 4-5liters

## 2019-06-17 NOTE — PAYOR COMM NOTE
--------------  DISCHARGE REVIEW    Jesse Rouse [facility]  Subscriber #:  [id number]  Authorization Number: [id number]      Admit date: 6/11/19  Admit time:  1340  Discharge Date: 6/15/2019  4:45 PM     Admitting Physician: Jean-Pierre Rankin MD  Attending Phys and diastolic heart failure who came in today to the emergency department with progressive dyspnea on exertion for the last 2 to 3 days. Today he was getting a bone scan because of new complaints of back pain to rule out metastatic disease.   Results of th eliquis b/c chemotherapy/recent cancer  -monitor rates on tele     Mild hyperkalemia.   Mild renal insufficiency.     Metastatic lung adenocarcinoma with mets to the liver, diagnosed Jan'2019  S/p 2 cycle of chemo, complicated by severe anemia, and respirat Units into the skin 2 (two) times daily before meals. Quantity:  1 vial  Refills:  0     predniSONE 10 MG Tabs  Commonly known as:  Isaura Sandhu  Start taking on:  6/14/2019  What changed:    · medication strength  · See the new instructions.       Take 4 ta Refills:  1     metFORMIN HCl 500 MG Tabs  Commonly known as:  GLUCOPHAGE      500 mg 2 (two) times daily with meals.    Refills:  0     Ondansetron HCl 8 MG tablet  Commonly known as:  ZOFRAN      Take 1 tablet (8 mg total) by mouth every 8 (eight) hours a rhonchi. Cardiovascular: S1, S2. Regular rate and rhythm. Abdomen: Soft, nontender, nondistended. Positive bowel sounds. No rebound or guarding. Neurologic: No focal neurological deficits. Musculoskeletal: Moves all extremities.   Extremities: +edema

## 2019-06-17 NOTE — CM/SW NOTE
MD order received regarding hospice. Referral has been sent to [facility]. [facility] to follow up with the pt. And family.       [phone]

## 2019-06-17 NOTE — ED NOTES
Pt to ED c/o SOB and abdominal pain/distention since being D/C'd from [address] [place] [address] yesterday. Pt reports he normally used 4L O2 NC @ home but feels SOB even with this and states he feels he has been unable to urinate completely.   Upon arrival, pt abdomen is distend

## 2019-06-17 NOTE — H&P
[facility]    PATIENT'S NAME: Holli Crew   ATTENDING PHYSICIAN: Alex Bhatia MD   PATIENT ACCOUNT#:   [id number]    LOCATION:  [place] [address]  MEDICAL RECORD #:   [id number]       YOB: 1940  ADMISSION DATE:       06/17 dysfunction; moderate pulmonary hypertension; diabetes mellitus type 2; hypertension; hyperlipidemia; hypothyroidism; cerebrovascular accident; paroxysmal atrial fibrillation anticoagulated with Eliquis; generalized osteoarthritis; renal insufficiency stag Tachycardic. ABDOMEN:  Soft. Mild distention, hypoactive bowel sounds. No tenderness to palpation but discomfort. No guarding or rebound tenderness. EXTREMITIES:  Trace edema both legs. No clubbing or cyanosis.   MUSCULOSKELETAL:  Otherwise unremarkab

## 2019-06-17 NOTE — HOSPICE RN NOTE
[facility] admitted to [facility] for pain management dx lung cancer. Spoke with Dr Teena Cárdenas medical director hospice and attending Dr Jyoti Castro for admission orders both agreeable to hospice GIP admit. POC reveiwed with RN Som Ivan.   Haley gregory

## 2019-06-18 VITALS
BODY MASS INDEX: 24.16 KG/M2 | RESPIRATION RATE: 25 BRPM | HEART RATE: 66 BPM | OXYGEN SATURATION: 93 % | DIASTOLIC BLOOD PRESSURE: 35 MMHG | HEIGHT: 65 IN | TEMPERATURE: 99 F | WEIGHT: 145 LBS | SYSTOLIC BLOOD PRESSURE: 127 MMHG

## 2019-06-18 NOTE — DISCHARGE SUMMARY
[facility]    PATIENT'S NAME: Rubén Hoover   ATTENDING PHYSICIAN: Paul Daniels MD   PATIENT ACCOUNT#:   [id number]    LOCATION:  [address] [place] [address] #:   [id number]       YOB: 1940  ADMISSION DATE:       06/17/2

## 2019-06-18 NOTE — H&P
[facility]    PATIENT'S NAME: Maribell Aakash   ATTENDING PHYSICIAN: Chad Kingsley MD   PATIENT ACCOUNT#:   [id number]    LOCATION:  [address] [place] [address] #:   [id number]       YOB: 1940  ADMISSION DATE:       06/17/2

## 2019-06-18 NOTE — SIGNIFICANT EVENT
Pt  at [facility]. Resusitation not attempted as pt was DNR.     Time of Death: 2019 @4083    Family Notified: Leelawrencemarielena Ernandez (spouse)    MD: Kate Frank    [facility] Notified: not a candidate; Stormy Gong #[id number]     contacted if applicable:

## 2019-06-19 NOTE — PAYOR COMM NOTE
--------------  ADMISSION REVIEW     Payor: Shirleyrobert 2 #:  [id number]  Authorization Number: [id number]    Admit date: 6/17/19  Admit time: [id number]       Admitting Physician: Amna Caban MD  Attending Physician:  Joanie attDolores ocampo reviewed and is not pertinent to presenting problem.     Social History    Tobacco Use      Smoking status: Former Smoker      Smokeless tobacco: Never Used    Alcohol use: No    Drug use: No      Review of Systems    Positive for stated complaint: sob  Oth normal mood and affect. Judgment normal.   Nursing note and vitals reviewed.     Differential diagnosis includes urinary retention, constipation, colitis, ascites, pneumonia, PE        ED Course     Labs Reviewed   BASIC METABOLIC PANEL (8) - Abnormal; Nota -----------         ------                     CBC W/ DIFFERENTIAL[[id number]]          Abnormal            Final result                 Please view results for these tests on the individual orders.    MD BLOOD SMEAR CONSULT   [facility] without a mechanical obstruction. Findings may represent under absorption, or a mild infectious enteritis. 7. Moderate to marked prostate enlargement with impingement on bladder trigone. 8. Nice catheter decompressing bladder.  9. Mild T11 vertebral compr Leukocytosis D72.829 6/17/2019 Yes    Primary malignant neoplasm of lung metastatic to other site [facility]) C34.90 Unknown Unknown    Thrombocytopenia ([facility] [address] D69.6 6/17/2019 Yes            Present on Admission  Date Reviewed: 5/22/2019          ICD-10-CM Noted medications at home, family history, social history; please refer to history and physical dictated by me on June 17, 2019. PHYSICAL EXAMINATION:  No change from H and P dictated on June 17, 2019. ASSESSMENT:    1. Recurrent respiratory failure  2. Was evaluated by hospice care and admitted to Inpatient Hospice. HOSPITAL COURSE:  The patient was started on IV morphine drip and Comfort Measure protocol. Eventually he . I was not present at the time of expiration.     Time of Death: 20

## 2019-06-21 ENCOUNTER — TELEPHONE (OUTPATIENT)
Dept: PULMONOLOGY | Facility: CLINIC | Age: 79
End: 2019-06-21

## 2019-06-21 DIAGNOSIS — J44.9 CHRONIC OBSTRUCTIVE PULMONARY DISEASE, UNSPECIFIED COPD TYPE (HCC): Primary | ICD-10-CM

## 2019-06-21 NOTE — TELEPHONE ENCOUNTER
Kiran requests order to discontinue pt's O2, Trilogy, & nebulizer be sent to [facility]. Explained will d/w MD, rx will be sent to Marifer Yee @ [facility] s/p MD's approval, & will have them contact him per his request. He voiced understanding. RN offered her condolences.  P

## 2019-06-21 NOTE — TELEPHONE ENCOUNTER
Son calling to inform [address] [place] [address] pt passed away on 6/17/19. Son states per Amberly Rey order is needed to retrieve medical equipment. Son requesting to have Amberly Rey contact him once office has made contact with Amberly Rey.  Please call [phone](CGNISA) Thank you

## 2019-06-26 NOTE — TELEPHONE ENCOUNTER
Notified pt's son of below. He stts equipment  is scheduled w/ Ariela Lo for today from 8 am to noon & they were told they will have to sign AMA waiver since no rx.  Explained what AMA waiver is for, detailed msg left for Cherre Child, & ok to sign this waiver if

## 2019-06-26 NOTE — TELEPHONE ENCOUNTER
Discussed below w/ Jacquelyn Pascal. She stts AMA waiver is not required now & rx was forwarded to local office in [place] picking up pt's equipment. Explained will inform pt's son. She voiced understanding. Brandie Mcnair was informed of the previous.  He verbalized Morelia

## 2019-06-26 NOTE — TELEPHONE ENCOUNTER
Cover sheet stating pt no longer needs these devices as he is  & pls contact pt's son- Alesia Heading @ #[phone], DME rx, & pt reg facesheet faxed to Stan Riojas @ [facility] @ #[phone]. Conf rcvd.

## 2019-07-01 NOTE — H&P
[facility]    PATIENT'S NAME: Cj Craig   ATTENDING PHYSICIAN: Cindy Linder MD   PATIENT ACCOUNT#:   [id number]    LOCATION:  [address] [place] [address] #:   [id number]       YOB: 1940  ADMISSION DATE:       06/17/2

## 2019-07-02 ENCOUNTER — APPOINTMENT (OUTPATIENT)
Dept: HEMATOLOGY/ONCOLOGY | Facility: HOSPITAL | Age: 79
End: 2019-07-02
Attending: NURSE PRACTITIONER
Payer: MEDICARE

## 2021-03-24 NOTE — PAYOR COMM NOTE
--------------  CONTINUED STAY REVIEW    PayorSgeetha James MA [facility]  Subscriber #:  [id number]  Authorization Number: [id number]    Admit date: 4/1/19  Admit time: 12    Admitting Physician: Milton Rouse MD  Attending Physician:  Misty Montes MD  [facility] Hospitalist Progress Note     PCP: KARISSA BRAVO    Chief Complaint: follow-up    Overnight/Interim Events:      SUBJECTIVE:  Pain ok, feels \"fair\". Sat up in chair.      OBJECTIVE:  Temp:  [98.2 °F (36.8 °C)-99.3 °F (37.4 °C)] 98.7 °F (37.1 °C) (NOVOLOG) 100 UNIT/ML flexpen 6 Units     Date Action Dose Route User    4/11/2019 1402 Given 6 Units Subcutaneous (Right Upper Arm) Fallon Juarez RN    4/11/2019 0908 Given 6 Units Subcutaneous (Right Lower Abdomen) Xin Brown RN      insulin d hydroxide, bisacodyl, Fleet Enema, ondansetron **OR** ondansetron HCl, Oxybutynin Chloride, Belladonna Alkaloids-Opium, Phenazopyridine HCl, glucose **OR** Glucose-Vitamin C **OR** dextrose **OR** glucose **OR** Glucose-Vitamin C       Assessment/Plan: Vitals reviewed.   Constitutional: He is oriented to person, place, and time and thin. He appears well-developed. HENT:   Head: Normocephalic and atraumatic. Cardiovascular: s1,s2 appreciated.   Pulmonary/Chest: + wheezing + rales  Abdominal: Soft. Christian 9.0 (L) 04/11/2019     HCT 29.7 (L) 04/11/2019     .0 (L) 04/11/2019     CREATSERUM 1.36 (H) 04/11/2019     BUN 52 (H) 04/11/2019      04/11/2019     K 4.9 04/11/2019      04/11/2019     CO2 37.0 (H) 04/11/2019      (H) 04/11/2019

## 2021-10-26 NOTE — PLAN OF CARE
Problem: Diabetes/Glucose Control  Goal: Glucose maintained within prescribed range  INTERVENTIONS:  - Monitor Blood Glucose as ordered  - Assess for signs and symptoms of hyperglycemia and hypoglycemia  - Administer ordered medications to maintain glucose threatening arrhythmias  - Monitor electrolytes and administer replacement therapy as ordered  Outcome: Not Progressing      Problem: RESPIRATORY - ADULT  Goal: Achieves optimal ventilation and oxygenation  INTERVENTIONS:  - Assess for changes in respirato Assess and document risk factors for pressure ulcer development  - Assess and document skin integrity  - Monitor for areas of redness and/or skin breakdown  - Initiate interventions, skin care algorithm/standards of care as needed  Outcome: Progressing Statement Selected

## 2021-11-20 NOTE — PLAN OF CARE
Problem: Patient Centered Care  Goal: Patient preferences are identified and integrated in the patient's plan of care  Interventions:  - What would you like us to know as we care for you?  I want to be home for [place]  - Provide timely, complete, and acc in mentation and behavior  - Position to facilitate oxygenation and minimize respiratory effort  - Oxygen supplementation based on oxygen saturation or ABGs  - Provide Smoking Cessation handout, if applicable  - Encourage broncho-pulmonary hygiene includin Alert and oriented, no focal deficits, no motor or sensory deficits.

## 2023-09-26 NOTE — PLAN OF CARE
METABOLIC/FLUID AND ELECTROLYTES - ADULT    • Glucose maintained within prescribed range Not Progressing          METABOLIC/FLUID AND ELECTROLYTES - ADULT    • Electrolytes maintained within normal limits Progressing    • Hemodynamic stability and optimal Pt is calling for his CT results.  Pls call his spouse back with them.

## 2024-06-10 NOTE — PLAN OF CARE
METABOLIC/FLUID AND ELECTROLYTES - ADULT    • Glucose maintained within prescribed range Progressing    • Electrolytes maintained within normal limits Progressing    • Hemodynamic stability and optimal renal function maintained Progressing        Patient C Lives in a [address] [place] [address] with elevator access. there is ramp in building. Pt has w/c that is used for outside home transportation. Pt has commode and rolling walker but not used anymore. Pt does not have hospital bed. She uses diapers at all times. Some days family assists with pillows for supported sitting in bed but pt does not sit at EOB. No - the patient is unable to be screened due to medical condition

## (undated) DEVICE — LINE MNTR ADLT SET O2 INTMD

## (undated) DEVICE — Device: Brand: DEFENDO AIR/WATER/SUCTION AND BIOPSY VALVE

## (undated) DEVICE — SYRINGE/GUAGE ASSEMBLY

## (undated) DEVICE — CONMED SCOPE SAVER BITE BLOCK, 20X27 MM: Brand: SCOPE SAVER

## (undated) DEVICE — Device: Brand: CUSTOM PROCEDURE KIT

## (undated) DEVICE — FORCEP RADIAL JAW 4

## (undated) NOTE — LETTER
[facility], [place]  Authorization for Invasive Procedures  1.  I hereby authorize Dr. Sade Adames , my physician and whomever may be designated as the doctor's assistant, to perform the following operation and/or procedure:  Jeimy Mercado performed for the purposes of advancing medicine, science, and/or education, provided my identity is not revealed. If the procedure has been videotaped, the physician/surgeon will obtain the original videotape.  The hospital will not be responsible for stor My signature below affirms that prior to the time of the procedure, I have explained to the patient and/or his legal representative, the risks and benefits involved in the proposed treatment and any reasonable alternative to the proposed treatment.  I have

## (undated) NOTE — MR AVS SNAPSHOT
Aleks Kuhn [address] [place] [address]  [phone]               Thank you for choosing us for your health care visit with Radha Marquez NP.   We are glad to serve you and happy to provide yo sodium foods include frozen dinners, soups (not homemade), some cereal, vegetable juice, canned vegetables, lunch meats, processed meats like hotdogs, sausage, webb, pepperoni, soy sauce, pre-packaged rice or potatoes.  Please remember to read nutrition la Take 4 mg by mouth daily with breakfast.   Commonly known as:  AMARYL           JOBST ACTIVE 15-20MMHG MEDIUM Misc   1 Package by Does not apply route See Admin Instructions.  Wearing during the day, remove at night - 15- 20 mmhg knee high           Levothy GFR, Non- >60 >=60    GFR, -American >60 >=60      Chronic Kidney Disease: GFR <60 ml/min/1.73 m2  Kidney failure: GFR <15 ml/min/1.73 m2    The accuracy of the MDRD equation is not suitable for acute renal failure patients and it i

## (undated) NOTE — LETTER
[address], [place]  Authorization for Invasive Procedures  1.  I hereby authorize  Dr. Ranjit Ferreira , my physician and whomever may be designated as the doctor's assistant, to perform the following operation and/or procedure: Port I performed for the purposes of advancing medicine, science, and/or education, provided my identity is not revealed. If the procedure has been videotaped, the physician/surgeon will obtain the original videotape.  The hospital will not be responsible for stor My signature below affirms that prior to the time of the procedure, I have explained to the patient and/or his legal representative, the risks and benefits involved in the proposed treatment and any reasonable alternative to the proposed treatment.  I have

## (undated) NOTE — IP AVS SNAPSHOT
[address]  [address] [facility] [phone]                Discharge Summary   1/4/2017    Tasneem Ford           Admission Information        Provider Department    1/4/2017 Massiel Lora MD, Dave Guerin MD [facility] 5 Inhale 2 puffs into the lungs every 4 (four) hours as needed for Wheezing. apixaban 2.5 MG Tabs   Last time this was given:  5 mg on 1/24/2017  7:53 AM   Commonly known as:  ELIQUIS        Take 5 mg by mouth 2 (two) times daily. - predniSONE 20 MG Tabs              Patient Instructions       DIABETES DISCHARGE INSTRUCTIONS GIVEN     OUTPATIENT PHYSICAL THERAPY     Follow-up Information     Follow up with leandro.     Why:  PLS SCHEDULE APPT WITH DR IN 2-3 DAYS FOR A DIABETI 2 (01/16/17)  0 (01/16/17)  0  (01/16/17)  9.7 (H) (01/16/17)  0.2 (L) (01/16/17)  0.2 (01/16/17)  0.0 (01/16/17)  0.0      Metabolic Lab Results  (Last result in the past 90 days)    HgbA1C Glucose BUN Creatinine Calcium Alkaline Phosph AST    (12/28/16) information, go to [email or web address] and click on the Sign Up Now link in the [employer] box. Enter your [employer] Information Digital Activation Code exactly as it appears below along with your Zip Code and Date of Birth to complete the sign-up process.  If you do constipation           Blood Thinners (Anticoagulants)     apixaban 2.5 MG Oral Tab       Use: Prevent the development or progression of blood clots   Most common side effects: Abnormal bleeding   What to report to your healthcare team: Bruising, blood in Levothyroxine Sodium (SYNTHROID) 50 MCG Oral Tab         Use: Regulate metabolism and inflammation   Most common side effects:  Dizziness, swelling, appetite changes, weight changes, changes in sleep and alertness, palpitations   What to report to your he

## (undated) NOTE — LETTER
[facility], [place]  Authorization for Invasive Procedures  1.  I hereby authorize Dr. Pringle Clamp  my physician and whomever may be designated as the doctor's assistant, to perform the following operation and/or procedure: Por performed for the purposes of advancing medicine, science, and/or education, provided my identity is not revealed. If the procedure has been videotaped, the physician/surgeon will obtain the original videotape.  The hospital will not be responsible for stor My signature below affirms that prior to the time of the procedure, I have explained to the patient and/or his legal representative, the risks and benefits involved in the proposed treatment and any reasonable alternative to the proposed treatment.  I have

## (undated) NOTE — LETTER
[facility]  Administration of Anesthesia  1. Jordon Razo, or _________________________________ acting on his behalf, (Patient) (Dependent/Representative) request to receive anesthesia for my pending procedure/operation/treatment. infections, high spinal block, spinal bleeding, seizure, cardiac arrest and death. 7. AWARENESS: I understand that it is possible (but unlikely) to have explicit memory of events from the operating room while under general anesthesia.   8. ELECTROCONVULSIV unconscious pt /Relationship    My signature below affirms that prior to the time of the procedure, I have explained to the patient and/or his/her guardian, the risks and benefits of undergoing anesthesia, as well as any reasonable alternatives.     _______

## (undated) NOTE — LETTER
[address], [place]  Authorization for Invasive Procedures  1.  I hereby authorize Dr. Abigail Mcbride , my physician and whomever may be designated as the doctor's assistant, to perform the following operation and/or procedure:  Esophagog performed for the purposes of advancing medicine, science, and/or education, provided my identity is not revealed. If the procedure has been videotaped, the physician/surgeon will obtain the original videotape.  The hospital will not be responsible for stor My signature below affirms that prior to the time of the procedure, I have explained to the patient and/or his legal representative, the risks and benefits involved in the proposed treatment and any reasonable alternative to the proposed treatment.  I have

## (undated) NOTE — ED AVS SNAPSHOT
Jeff Lerner   MRN: [id number]    Department:  [facility] Emergency Department   Date of Visit:  5/30/2019           Disclosure     Insurance plans vary and the physician(s) referred by the ER may not be covered by your plan.  Please contact within the next three months to obtain basic health screening including reassessment of your blood pressure.     IF THERE IS ANY CHANGE OR WORSENING OF YOUR CONDITION, CALL YOUR PRIMARY CARE PHYSICIAN AT ONCE OR RETURN IMMEDIATELY TO THE EMERGENCY DEPARTMEN

## (undated) NOTE — LETTER
[place] [facility]  Administration of Anesthesia  1. Sarah Razo, or _________________________________ acting on his behalf, (Patient) (Dependent/Representative) request to receive anesthesia for my pending procedure/operation/treatment. infections, high spinal block, spinal bleeding, seizure, cardiac arrest and death. 7. AWARENESS: I understand that it is possible (but unlikely) to have explicit memory of events from the operating room while under general anesthesia.   8. ELECTROCONVULSIV unconscious pt /Relationship    My signature below affirms that prior to the time of the procedure, I have explained to the patient and/or his/her guardian, the risks and benefits of undergoing anesthesia, as well as any reasonable alternatives.     _______